# Patient Record
Sex: FEMALE | Race: WHITE | NOT HISPANIC OR LATINO | Employment: OTHER | URBAN - METROPOLITAN AREA
[De-identification: names, ages, dates, MRNs, and addresses within clinical notes are randomized per-mention and may not be internally consistent; named-entity substitution may affect disease eponyms.]

---

## 2017-02-13 ENCOUNTER — OFFICE VISIT (OUTPATIENT)
Dept: MEDICAL GROUP | Facility: MEDICAL CENTER | Age: 77
End: 2017-02-13

## 2017-02-13 VITALS
HEIGHT: 71 IN | HEART RATE: 70 BPM | BODY MASS INDEX: 21 KG/M2 | DIASTOLIC BLOOD PRESSURE: 70 MMHG | SYSTOLIC BLOOD PRESSURE: 102 MMHG | OXYGEN SATURATION: 94 % | TEMPERATURE: 98.4 F | WEIGHT: 150 LBS

## 2017-02-13 DIAGNOSIS — M15.9 PRIMARY OSTEOARTHRITIS INVOLVING MULTIPLE JOINTS: ICD-10-CM

## 2017-02-13 DIAGNOSIS — Z76.89 ENCOUNTER TO ESTABLISH CARE WITH NEW DOCTOR: ICD-10-CM

## 2017-02-13 PROCEDURE — 99203 OFFICE O/P NEW LOW 30 MIN: CPT | Performed by: FAMILY MEDICINE

## 2017-02-13 RX ORDER — POLYMYXIN B SULFATE AND TRIMETHOPRIM 1; 10000 MG/ML; [USP'U]/ML
SOLUTION OPHTHALMIC
COMMUNITY
Start: 2016-11-04 | End: 2017-02-16

## 2017-02-13 RX ORDER — CALCIUM CARBONATE 500(1250)
500 TABLET ORAL
COMMUNITY

## 2017-02-13 RX ORDER — NAPROXEN SODIUM 220 MG
TABLET ORAL
COMMUNITY
End: 2017-02-16

## 2017-02-13 RX ORDER — DIPHENOXYLATE HYDROCHLORIDE AND ATROPINE SULFATE 2.5; .025 MG/1; MG/1
1 TABLET ORAL DAILY
COMMUNITY

## 2017-02-13 RX ORDER — AMOXICILLIN 500 MG/1
CAPSULE ORAL
COMMUNITY
Start: 2016-05-13 | End: 2017-02-16

## 2017-02-13 RX ORDER — CHLORHEXIDINE GLUCONATE ORAL RINSE 1.2 MG/ML
SOLUTION DENTAL
COMMUNITY
Start: 2016-05-13 | End: 2017-02-16

## 2017-02-13 RX ORDER — PREDNISOLONE ACETATE 10 MG/ML
SUSPENSION/ DROPS OPHTHALMIC
COMMUNITY
Start: 2016-11-04 | End: 2017-02-16

## 2017-02-13 RX ORDER — METHYLPREDNISOLONE 4 MG/1
TABLET ORAL
COMMUNITY
Start: 2016-06-07 | End: 2017-02-16

## 2017-02-13 RX ORDER — CHLORHEXIDINE GLUCONATE ORAL RINSE 1.2 MG/ML
SOLUTION DENTAL
COMMUNITY
Start: 2016-06-07 | End: 2017-02-16

## 2017-02-13 RX ORDER — AMOXICILLIN 500 MG/1
CAPSULE ORAL
COMMUNITY
Start: 2016-11-22 | End: 2017-02-16

## 2017-02-13 RX ORDER — ACETAMINOPHEN 500 MG
TABLET ORAL
COMMUNITY
Start: 2016-07-11 | End: 2017-02-16

## 2017-02-13 RX ORDER — METHYLPREDNISOLONE 4 MG/1
TABLET ORAL
COMMUNITY
Start: 2016-05-13 | End: 2017-02-16

## 2017-02-13 RX ORDER — AMOXICILLIN 500 MG/1
CAPSULE ORAL
COMMUNITY
Start: 2016-06-07 | End: 2017-02-16

## 2017-02-13 ASSESSMENT — PATIENT HEALTH QUESTIONNAIRE - PHQ9: CLINICAL INTERPRETATION OF PHQ2 SCORE: 0

## 2017-02-13 NOTE — MR AVS SNAPSHOT
"        Mirian Jack   2017 1:40 PM   Office Visit   MRN: [id number]    Department:  [facility]   Dept Phone:  [phone]    Description:  Female : 1940   Provider:  Mari Light M.D.           Reason for Visit     Establish Care           Allergies as of 2017     Allergen Noted Reactions    Aspirin 2017   Unspecified    GI upset and sweating if she takes Aspirin or Advil on a regular basis.    Ibuprofen 2017   Unspecified    GI upset and sweating if she takes Advil or Aspirin on a regular basis.      Vital Signs     Blood Pressure Pulse Temperature Height Weight Body Mass Index    102/70 mmHg 70 36.9 °C (98.4 °F) 1.791 m (5' 10.51\") 68.04 kg (150 lb) 21.21 kg/m2    Oxygen Saturation Breastfeeding? Smoking Status             94% No Never Smoker          Basic Information     Date Of Birth Sex Race Ethnicity Preferred Language    1940 Female White Non- English      Health Maintenance        Date Due Completion Dates    IMM DTaP/Tdap/Td Vaccine (1 - Tdap) 1960 ---    PAP SMEAR 1962 ---    MAMMOGRAM 1981 ---    COLONOSCOPY 1991 ---    IMM ZOSTER VACCINE 2001 ---    BONE DENSITY 2006 ---    IMM PNEUMOCOCCAL 65+ (ADULT) LOW/MEDIUM RISK SERIES (1 of 2 - PCV13) 2006 ---    IMM INFLUENZA (1) 2016 ---            Current Immunizations     No immunizations on file.      Below and/or attached are the medications your provider expects you to take. Review all of your home medications and newly ordered medications with your provider and/or pharmacist. Follow medication instructions as directed by your provider and/or pharmacist. Please keep your medication list with you and share with your provider. Update the information when medications are discontinued, doses are changed, or new medications (including over-the-counter products) are added; and carry medication information at all times in the event of emergency situations    " Allergies:  ASPIRIN - Unspecified     IBUPROFEN - Unspecified               Medications  Valid as of: February 13, 2017 -  2:49 PM    Generic Name Brand Name Tablet Size Instructions for use    Acetaminophen (Tab) TYLENOL 500 MG Take  by mouth.        Amoxicillin (Cap) AMOXIL 500 MG Take 1 capsule orally three times a day until gone. Start the day of surgery.        Amoxicillin (Cap) AMOXIL 500 MG Take 1 capsule by mouth 3 times a day until gone        Amoxicillin (Cap) AMOXIL 500 MG Take 4 capsules 1 hour prior to appointment        B Complex Vitamins (Tab) b complex vitamins  Take  by mouth.        Calcium (Tab) OS-DEANN 500 500 MG Take  by mouth.        Chlorhexidine Gluconate (Solution) PERIDEX 0.12 % Rinse with one-half ounce orally twice daily for 2 weeks. Start 24 hours after surgery. Swish and spit.        Chlorhexidine Gluconate (Solution) PERIDEX 0.12 % Rinse one-half ounce orally 2 times daily for 2 weeks. Start 24 hours post op.        Cholecalciferol   Take  by mouth.        Cyanocobalamin (Tab) VITAMIN B12 1000 MCG Take  by mouth.        MethylPREDNISolone (Tablet Therapy Pack) MEDROL DOSEPAK 4 MG Day 1: Take 2 tablets orally at breakfast, 1 at lunch, 1 at dinner, 2 at bedtime. Day 2: 1 tablet 3 times a day plus 2 at bedtime. Day 3: 1 tablet 4 times a day. Day 4: 1 tablet 3 times a day. Day 5: 1 tablet 2 times a day. Day 6: 1 tablet at breakfast, then STOP        MethylPREDNISolone (Tablet Therapy Pack) MEDROL DOSEPAK 4 MG Use as directed until gone. Start the morning of procedure.        Multiple Vitamin (Tab) MULTI-VITAMINS  Take  by mouth.        Naproxen Sodium (Tab) ANAPROX 220 MG Take  by mouth.        Polymyxin B-Trimethoprim (Solution) POLYTRIM 55850-6.1 UNIT/ML-% by Ophthalmic route.        PrednisoLONE Acetate (Suspension) PRED FORTE 1 % by Ophthalmic route.        .                 Medicines prescribed today were sent to:     None      Medication refill instructions:       If your prescription  bottle indicates you have medication refills left, it is not necessary to call your provider’s office. Please contact your pharmacy and they will refill your medication.    If your prescription bottle indicates you do not have any refills left, you may request refills at any time through one of the following ways: The online Dotflux system (except Urgent Care), by calling your provider’s office, or by asking your pharmacy to contact your provider’s office with a refill request. Medication refills are processed only during regular business hours and may not be available until the next business day. Your provider may request additional information or to have a follow-up visit with you prior to refilling your medication.   *Please Note: Medication refills are assigned a new Rx number when refilled electronically. Your pharmacy may indicate that no refills were authorized even though a new prescription for the same medication is available at the pharmacy. Please request the medicine by name with the pharmacy before contacting your provider for a refill.           Dotflux Access Code: [email or web address]  Expires: 3/15/2017  2:49 PM    Your email address is not on file at [facility].  Email Addresses are required for you to sign up for Dotflux, please contact [phone] to verify your personal information and to provide your email address prior to attempting to register for Dotflux.    Mirian Jack  [address] [place] [address]    Dotflux  A secure, online tool to manage your health information     [facility]’s Dotflux® is a secure, online tool that connects you to your personalized health information from the privacy of your home -- day or night - making it very easy for you to manage your healthcare. Once the activation process is completed, you can even access your medical information using the Dotflux leonidas, which is available for free in the Apple Leonidas store or Google Play store.     To learn more about Dotflux,  visit [email or web address]    There are two levels of access available (as shown below):   My Chart Features  [facility]  Urgent  Care Non-[facility]   Email your healthcare team securely and privately 24/7 X X X    Manage appointments: schedule your next appointment; view details of past/upcoming appointments X      Request prescription refills. X      View recent personal medical records, including lab and immunizations X X X X   View health record, including health history, allergies, medications X X X X   Read reports about your outpatient visits, procedures, consult and ER notes X X X X   See your discharge summary, which is a recap of your hospital and/or ER visit that includes your diagnosis, lab results, and care plan X X  X     How to register for [employer]:  Once your e-mail address has been verified, follow the following steps to sign up for [employer]ark[employer].     1. Go to  [email or web address]  2. Click on the Sign Up Now box, which takes you to the New Member Sign Up page. You will need to provide the following information:  a. Enter your Trademarkia Access Code exactly as it appears at the top of this page. (You will not need to use this code after you’ve completed the sign-up process. If you do not sign up before the expiration date, you must request a new code.)   b. Enter your date of birth.   c. Enter your home email address.   d. Click Submit, and follow the next screen’s instructions.  3. Create a Trademarkia ID. This will be your Trademarkia login ID and cannot be changed, so think of one that is secure and easy to remember.  4. Create a Trademarkia password. You can change your password at any time.  5. Enter your Password Reset Question and Answer. This can be used at a later time if you forget your password.   6. Enter your e-mail address. This allows you to receive e-mail notifications when new information is available in Tr[employer]arkia.  7. Click Sign Up. You  can now view your health information.    For assistance activating your [facility] account, call [phone]

## 2017-02-16 PROBLEM — Z76.89 ENCOUNTER TO ESTABLISH CARE WITH NEW DOCTOR: Status: ACTIVE | Noted: 2017-02-16

## 2017-02-16 PROBLEM — M19.91 PRIMARY OSTEOARTHRITIS: Status: ACTIVE | Noted: 2017-02-16

## 2017-02-16 NOTE — ASSESSMENT & PLAN NOTE
Patient is here today to establish care. She and her  recently moved to the area. She was tackled at [facility]. 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 She's been doing well without any real complaints. She was seen at [place] and [facility] and so those records are available in Care Everywhere.

## 2017-02-16 NOTE — PROGRESS NOTES
Chief Complaint   Patient presents with   • Establish Care         Mirian Jack is a 76 y.o. female here to establish care and for evaluation and management of:        HPI:    Encounter to establish care with new doctor  Patient is here today to establish care. She and her  recently moved to the area. She was tackled at [facility]. 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 She's been doing well without any real complaints. She was seen at [facility] and [facility] and so those records are available in Care Everywhere.        Allergies   Allergen Reactions   • Aspirin Unspecified     GI upset and sweating if she takes Aspirin or Advil on a regular basis.   • Ibuprofen Unspecified     GI upset and sweating if she takes Advil or Aspirin on a regular basis.       Current medicines (including changes today)  Current Outpatient Prescriptions   Medication Sig Dispense Refill   • calcium carbonate (RA CALCIUM) 500 MG Tab Take  by mouth.     • cyanocobalamin (VITAMIN B12) 1000 MCG Tab Take  by mouth.     • Cholecalciferol (VITAMIN D PO) Take  by mouth.     • Multiple Vitamin (MULTI-VITAMINS) Tab Take  by mouth.     • b complex vitamins tablet Take  by mouth.       No current facility-administered medications for this visit.     She  has a past medical history of Cataract; Cancer (CMS-[facility]); Macular degeneration; and Tubular adenoma of colon.  She  has past surgical history that includes knee arthroplasty total (Right); foot surgery; laminotomy (); and eye surgery.  Social History   Substance Use Topics   • Smoking status: Never Smoker    • Smokeless tobacco: Never Used   • Alcohol Use: 6.0 oz/week     10 Glasses of wine per week     Social History     Social History Narrative     Family History   Problem Relation Age of Onset   • Heart Disease Mother    • Stroke Father      Family Status   Relation Status Death Age   • Mother     • Father     • Sister Alive   "        ROS  No fever or chills.  No nausea or vomiting.  No chest pain or palpitations.  No cough or SOB.  No pain with urination or hematuria.  No black or bloody stools.  All other systems reviewed and are negative     Objective:     Blood pressure 102/70, pulse 70, temperature 36.9 °C (98.4 °F), height 1.791 m (5' 10.51\"), weight 68.04 kg (150 lb), SpO2 94 %, not currently breastfeeding. Body mass index is 21.21 kg/(m^2).  Physical Exam:      Well developed, well nourished.  Alert, oriented in no acute distress.  Psych: Eye contact is good, speech goal directed, affect calm  Eyes: conjunctiva non-injected, sclera non-icteric.  Neck Supple.  No adenopathy or masses in the neck or supraclavicular regions. No thyromegaly  Lungs: clear to auscultation bilaterally with good excursion. No wheezes or rhonchi  CV: regular rate and rhythm. No murmur  Ext: no edema, color normal, vascularity normal, temperature normal      Assessment and Plan:   The following treatment plan was discussed    1. Encounter to establish care with new doctor  Reviewed old records. Patient will schedule well visit when due.    2. Primary osteoarthritis involving multiple joints  Patient managing symptomatically. No further therapy needed at this time      Records reviewed    Any change or worsening of signs or symptoms, patient encouraged to follow-up or report to the emergency room for further evaluation. Patient understands and agrees.    Followup: Return in about 6 months (around 8/13/2017).           "

## 2017-12-12 ENCOUNTER — OFFICE VISIT (OUTPATIENT)
Dept: MEDICAL GROUP | Facility: MEDICAL CENTER | Age: 77
End: 2017-12-12
Payer: MEDICARE

## 2017-12-12 VITALS
OXYGEN SATURATION: 94 % | TEMPERATURE: 97.4 F | BODY MASS INDEX: 20.58 KG/M2 | WEIGHT: 147 LBS | HEART RATE: 86 BPM | SYSTOLIC BLOOD PRESSURE: 112 MMHG | DIASTOLIC BLOOD PRESSURE: 60 MMHG | HEIGHT: 71 IN

## 2017-12-12 DIAGNOSIS — Z86.010 HISTORY OF COLONIC POLYPS: ICD-10-CM

## 2017-12-12 DIAGNOSIS — D50.9 MICROCYTIC ANEMIA: ICD-10-CM

## 2017-12-12 DIAGNOSIS — Z13.1 SCREENING FOR DIABETES MELLITUS: ICD-10-CM

## 2017-12-12 DIAGNOSIS — I70.0 AORTIC ATHEROSCLEROSIS (HCC): ICD-10-CM

## 2017-12-12 DIAGNOSIS — Z13.29 SCREENING FOR THYROID DISORDER: ICD-10-CM

## 2017-12-12 DIAGNOSIS — E78.5 DYSLIPIDEMIA: ICD-10-CM

## 2017-12-12 DIAGNOSIS — M15.9 PRIMARY OSTEOARTHRITIS INVOLVING MULTIPLE JOINTS: ICD-10-CM

## 2017-12-12 DIAGNOSIS — Z85.828 HISTORY OF BASAL CELL CARCINOMA: ICD-10-CM

## 2017-12-12 DIAGNOSIS — Z00.00 MEDICARE ANNUAL WELLNESS VISIT, INITIAL: ICD-10-CM

## 2017-12-12 DIAGNOSIS — N63.20 BREAST MASS, LEFT: ICD-10-CM

## 2017-12-12 DIAGNOSIS — N28.9 RENAL DYSFUNCTION: ICD-10-CM

## 2017-12-12 DIAGNOSIS — Z23 NEED FOR VACCINATION: ICD-10-CM

## 2017-12-12 PROBLEM — Z76.89 ENCOUNTER TO ESTABLISH CARE WITH NEW DOCTOR: Status: RESOLVED | Noted: 2017-02-16 | Resolved: 2017-12-12

## 2017-12-12 PROCEDURE — 99213 OFFICE O/P EST LOW 20 MIN: CPT | Mod: 25 | Performed by: FAMILY MEDICINE

## 2017-12-12 PROCEDURE — G0438 PPPS, INITIAL VISIT: HCPCS | Mod: 25 | Performed by: FAMILY MEDICINE

## 2017-12-12 PROCEDURE — 90670 PCV13 VACCINE IM: CPT | Performed by: FAMILY MEDICINE

## 2017-12-12 PROCEDURE — G0009 ADMIN PNEUMOCOCCAL VACCINE: HCPCS | Performed by: FAMILY MEDICINE

## 2017-12-12 RX ORDER — ASCORBIC ACID 500 MG
500 TABLET ORAL DAILY
COMMUNITY

## 2017-12-12 RX ORDER — TURMERIC ROOT EXTRACT 500 MG
1 TABLET ORAL DAILY
COMMUNITY

## 2017-12-12 ASSESSMENT — PATIENT HEALTH QUESTIONNAIRE - PHQ9
5. POOR APPETITE OR OVEREATING: 0 - NOT AT ALL
SUM OF ALL RESPONSES TO PHQ QUESTIONS 1-9: 2
CLINICAL INTERPRETATION OF PHQ2 SCORE: 2

## 2017-12-12 NOTE — PROGRESS NOTES
Chief Complaint   Patient presents with   • Annual Wellness Visit         HPI:  Mirian is a 77 y.o. here for Medicare Annual Wellness Visit          Patient Active Problem List    Diagnosis Date Noted   • Dyslipidemia 12/12/2017   • Microcytic anemia 12/12/2017   • Renal dysfunction 12/12/2017   • Breast mass, left 12/12/2017   • Primary osteoarthritis 02/16/2017   • History of tubular adenoma of the colon    • Aortic atherosclerosis (CMS-HCC) 10/12/2016   • History of basal cell carcinoma 11/11/2015       Current Outpatient Prescriptions   Medication Sig Dispense Refill   • ascorbic acid (ASCORBIC ACID) 500 MG Tab Take 500 mg by mouth every day.     • Turmeric 500 MG Tab Take  by mouth.     • calcium carbonate (RA CALCIUM) 500 MG Tab Take  by mouth.     • cyanocobalamin (VITAMIN B12) 1000 MCG Tab Take  by mouth.     • Cholecalciferol (VITAMIN D PO) Take  by mouth.     • Multiple Vitamin (MULTI-VITAMINS) Tab Take  by mouth.     • b complex vitamins tablet Take  by mouth.       No current facility-administered medications for this visit.         Patient is taking medications as noted in medication list.  Current supplements as per medication list.     Allergies: Aspirin and Ibuprofen    Current social contact/activities: Pt is very active with physical activities. .      Is patient current with immunizations? No, due for PREVNAR (PCV13) . Patient is interested in receiving PREVNAR (PCV13)  today.    She  reports that she has never smoked. She has never used smokeless tobacco. She reports that she drinks about 6.0 oz of alcohol per week .  Counseling given: Not Answered        DPA/Advanced directive: Patient does not have an Advanced Directive.  A packet and workshop information was given on Advanced Directives.    ROS:    Gait: Uses no assistive device   Ostomy: no   Other tubes: no   Amputations: no   Chronic oxygen use no   Last eye exam 1 year ago    Wears hearing aids: no   : Denies any urinary leakage during  the last 6 months        Depression Screening    Little interest or pleasure in doing things?  0 - not at all  Feeling down, depressed, or hopeless? 2 - more than half the days  Trouble falling or staying asleep, or sleeping too much?  0 - not at all  Feeling tired or having little energy?  0 - not at all  Poor appetite or overeating?  0 - not at all  Feeling bad about yourself - or that you are a failure or have let yourself or your family down? 0 - not at all  Trouble concentrating on things, such as reading the newspaper or watching television? 0 - not at all  Moving or speaking so slowly that other people could have noticed.  Or the opposite - being so fidgety or restless that you have been moving around a lot more than usual?  0 - not at all  Thoughts that you would be better off dead, or of hurting yourself?  0 - not at all  Patient Health Questionnaire Score: 2      If depressive symptoms identified deferred to follow up visit unless specifically addressed in assessment and plan.    Interpretation of PHQ-9 Total Score   Score Severity   1-4 No Depression   5-9 Mild Depression   10-14 Moderate Depression   15-19 Moderately Severe Depression   20-27 Severe Depression      Screening for Cognitive Impairment    Three Minute Recall (apple, watch, bernie)  3/3    Draw clock face with all 12 numbers set to the hand to show 10 minutes past 11 o'clock  1 5/5  If cognitive concerns identified, deferred for follow up unless specifically addressed in assessment and plan.    Fall Risk Assessment    Has the patient had two or more falls in the last year or any fall with injury in the last year?  Yes  If fall risk identified, deferred for follow up unless specifically addressed in assessment and plan.      Safety Assessment    Throw rugs on floor.  Yes  Handrails on all stairs.  Yes  Good lighting in all hallways.  Yes  Difficulty hearing.  No  Patient counseled about all safety risks that were identified.    Functional  "Assessment ADLs    Are there any barriers preventing you from cooking for yourself or meeting nutritional needs?  No.    Are there any barriers preventing you from driving safely or obtaining transportation?  No.    Are there any barriers preventing you from using a telephone or calling for help?  No.    Are there any barriers preventing you from shopping?  No.    Are there any barriers preventing you from taking care of your own finances?  No.    Are there any barriers preventing you from managing your medications?  No.    Are you currently engaging any exercise or physical activity?  Yes.  Pt goes to the gym on a daily basis.     Health Maintenance Summary                IMM PNEUMOCOCCAL 65+ (ADULT) LOW/MEDIUM RISK SERIES Overdue 11/12/2006     MAMMOGRAM Overdue 5/29/2016      Done 5/29/2015     IMM INFLUENZA Overdue 9/1/2017      Done 10/7/2016 Imm Admin: Influenza Vaccine Adult HD    BONE DENSITY Next Due 7/9/2020      Done 7/9/2015     IMM DTaP/Tdap/Td Vaccine Next Due 9/27/2022      Done 9/27/2012 Imm Admin: Tdap Vaccine    COLONOSCOPY Next Due 6/15/2026      Done 6/15/2016           Patient Care Team:  Mari Light M.D. as PCP - General (Family Medicine)      Social History   Substance Use Topics   • Smoking status: Never Smoker   • Smokeless tobacco: Never Used   • Alcohol use 6.0 oz/week     10 Glasses of wine per week     Family History   Problem Relation Age of Onset   • Heart Disease Mother    • Stroke Father      She  has a past medical history of Cancer (CMS-HCC); Cataract; Macular degeneration; and Tubular adenoma of colon.   Past Surgical History:   Procedure Laterality Date   • LAMINOTOMY  2000    L4-5   • EYE SURGERY      lasik and cataracts   • FOOT SURGERY     • KNEE ARTHROPLASTY TOTAL Right          Exam:     Blood pressure 112/60, pulse 86, temperature 36.3 °C (97.4 °F), height 1.791 m (5' 10.5\"), weight 66.7 kg (147 lb), SpO2 94 %. Body mass index is 20.79 kg/m².    Hearing good.  "   Dentition good  Alert, oriented in no acute distress.  Eye contact is good, speech goal directed, affect calm  Neck: Supple, no adenopathy  Lungs: Clear to auscultation bilaterally without wheezes or rhonchi  CV: Regular rate and rhythm without murmur  BREAST EXAM: No skin changes, peau d'orange or nipple retraction. No discharge. No axillary or supraclavicular adenopathy. There is a cystic mass inferior to the left breast that is freely mobile with smooth margins and less than a centimeter in diameter      Assessment and Plan. The following treatment and monitoring plan is recommended:  1. Medicare annual wellness visit, initial  Routine anticipatory guidance. Continue regular exercise. No  needed at this time    2. Dyslipidemia  Stable. Discussed low-fat diet. Recheck labs  - LIPID PROFILE; Future    3. Microcytic anemia  Recheck CBC and iron studies. Await results  - CBC WITH DIFFERENTIAL; Future  - FERRITIN; Future  - IRON/TOTAL IRON BIND; Future    4. Primary osteoarthritis involving multiple joints  Given the patient's osteoarthritis we will check for autoimmune dysfunction  - DARIA ANTIBODY WITH REFLEX; Future  - CCP ANTIBODY; Future  - RHEUMATOID ARTHRITIS FACTOR; Future    5. History of tubular adenoma of the colon  Stable. Continue follow-up with gastroenterology    6. History of basal cell carcinoma  Stable. Continue sun protection and dermatology follow-up    7. Aortic atherosclerosis (CMS-HCC)  Stable. Continue to monitor    8. Renal dysfunction  Avoid NSAIDs. Continue to monitor  - COMP METABOLIC PANEL; Future    9. Screening for thyroid disorder  Screening labs ordered.  Await results for counseling.    - TSH; Future    10. Screening for diabetes mellitus  Screening labs ordered.  Await results for counseling.    - COMP METABOLIC PANEL; Future    11. Breast mass, left  Diagnostic mammogram  - MA-DIAGNOSTIC MAMMO W/CAD-BILAT; Future  - US-BREAST COMPLETE-LEFT; Future    12. Need for  vaccination    - PNEUMOCOCCAL CONJUGATE VACCINE 13-VALENT      Services suggested: No services needed at this time  Health Care Screening recommendations as per orders if indicated.  Referrals offered: PT/OT/Nutrition counseling/Behavioral Health/Smoking cessation as per orders if indicated.    Discussion today about general wellness and lifestyle habits:    · Prevent falls and reduce trip hazards; Cautioned about securing or removing rugs.  · Have a working fire alarm and carbon monoxide detector;   · Engage in regular physical activity and social activities       Follow-up: Return if symptoms worsen or fail to improve.

## 2017-12-15 ENCOUNTER — HOSPITAL ENCOUNTER (OUTPATIENT)
Dept: LAB | Facility: MEDICAL CENTER | Age: 77
End: 2017-12-15
Attending: FAMILY MEDICINE
Payer: MEDICARE

## 2017-12-15 DIAGNOSIS — D50.9 MICROCYTIC ANEMIA: ICD-10-CM

## 2017-12-15 DIAGNOSIS — Z13.1 SCREENING FOR DIABETES MELLITUS: ICD-10-CM

## 2017-12-15 DIAGNOSIS — N28.9 RENAL DYSFUNCTION: ICD-10-CM

## 2017-12-15 DIAGNOSIS — M15.9 PRIMARY OSTEOARTHRITIS INVOLVING MULTIPLE JOINTS: ICD-10-CM

## 2017-12-15 DIAGNOSIS — Z13.29 SCREENING FOR THYROID DISORDER: ICD-10-CM

## 2017-12-15 DIAGNOSIS — E78.5 DYSLIPIDEMIA: ICD-10-CM

## 2017-12-15 LAB
ALBUMIN SERPL BCP-MCNC: 4.1 G/DL (ref 3.2–4.9)
ALBUMIN/GLOB SERPL: 1.6 G/DL
ALP SERPL-CCNC: 81 U/L (ref 30–99)
ALT SERPL-CCNC: 14 U/L (ref 2–50)
ANION GAP SERPL CALC-SCNC: 10 MMOL/L (ref 0–11.9)
AST SERPL-CCNC: 19 U/L (ref 12–45)
BASOPHILS # BLD AUTO: 1.3 % (ref 0–1.8)
BASOPHILS # BLD: 0.06 K/UL (ref 0–0.12)
BILIRUB SERPL-MCNC: 1.1 MG/DL (ref 0.1–1.5)
BUN SERPL-MCNC: 15 MG/DL (ref 8–22)
CALCIUM SERPL-MCNC: 9.5 MG/DL (ref 8.5–10.5)
CHLORIDE SERPL-SCNC: 100 MMOL/L (ref 96–112)
CHOLEST SERPL-MCNC: 252 MG/DL (ref 100–199)
CO2 SERPL-SCNC: 26 MMOL/L (ref 20–33)
CREAT SERPL-MCNC: 0.76 MG/DL (ref 0.5–1.4)
EOSINOPHIL # BLD AUTO: 0.16 K/UL (ref 0–0.51)
EOSINOPHIL NFR BLD: 3.4 % (ref 0–6.9)
ERYTHROCYTE [DISTWIDTH] IN BLOOD BY AUTOMATED COUNT: 45.6 FL (ref 35.9–50)
FERRITIN SERPL-MCNC: 122.7 NG/ML (ref 10–291)
GFR SERPL CREATININE-BSD FRML MDRD: >60 ML/MIN/1.73 M 2
GLOBULIN SER CALC-MCNC: 2.6 G/DL (ref 1.9–3.5)
GLUCOSE SERPL-MCNC: 86 MG/DL (ref 65–99)
HCT VFR BLD AUTO: 40.9 % (ref 37–47)
HDLC SERPL-MCNC: 92 MG/DL
HGB BLD-MCNC: 13.9 G/DL (ref 12–16)
IMM GRANULOCYTES # BLD AUTO: 0.01 K/UL (ref 0–0.11)
IMM GRANULOCYTES NFR BLD AUTO: 0.2 % (ref 0–0.9)
IRON SATN MFR SERPL: 47 % (ref 15–55)
IRON SERPL-MCNC: 168 UG/DL (ref 40–170)
LDLC SERPL CALC-MCNC: 147 MG/DL
LYMPHOCYTES # BLD AUTO: 1.2 K/UL (ref 1–4.8)
LYMPHOCYTES NFR BLD: 25.8 % (ref 22–41)
MCH RBC QN AUTO: 33.9 PG (ref 27–33)
MCHC RBC AUTO-ENTMCNC: 34 G/DL (ref 33.6–35)
MCV RBC AUTO: 99.8 FL (ref 81.4–97.8)
MONOCYTES # BLD AUTO: 0.4 K/UL (ref 0–0.85)
MONOCYTES NFR BLD AUTO: 8.6 % (ref 0–13.4)
NEUTROPHILS # BLD AUTO: 2.83 K/UL (ref 2–7.15)
NEUTROPHILS NFR BLD: 60.7 % (ref 44–72)
NRBC # BLD AUTO: 0 K/UL
NRBC BLD AUTO-RTO: 0 /100 WBC
PLATELET # BLD AUTO: 241 K/UL (ref 164–446)
PMV BLD AUTO: 8.8 FL (ref 9–12.9)
POTASSIUM SERPL-SCNC: 3.7 MMOL/L (ref 3.6–5.5)
PROT SERPL-MCNC: 6.7 G/DL (ref 6–8.2)
RBC # BLD AUTO: 4.1 M/UL (ref 4.2–5.4)
RHEUMATOID FACT SER IA-ACNC: 18 IU/ML (ref 0–14)
SODIUM SERPL-SCNC: 136 MMOL/L (ref 135–145)
TIBC SERPL-MCNC: 360 UG/DL (ref 250–450)
TRIGL SERPL-MCNC: 65 MG/DL (ref 0–149)
TSH SERPL DL<=0.005 MIU/L-ACNC: 2.15 UIU/ML (ref 0.38–5.33)
WBC # BLD AUTO: 4.7 K/UL (ref 4.8–10.8)

## 2017-12-15 PROCEDURE — 82728 ASSAY OF FERRITIN: CPT

## 2017-12-15 PROCEDURE — 86235 NUCLEAR ANTIGEN ANTIBODY: CPT

## 2017-12-15 PROCEDURE — 84443 ASSAY THYROID STIM HORMONE: CPT

## 2017-12-15 PROCEDURE — 83540 ASSAY OF IRON: CPT

## 2017-12-15 PROCEDURE — 80061 LIPID PANEL: CPT

## 2017-12-15 PROCEDURE — 85025 COMPLETE CBC W/AUTO DIFF WBC: CPT

## 2017-12-15 PROCEDURE — 83550 IRON BINDING TEST: CPT

## 2017-12-15 PROCEDURE — 86225 DNA ANTIBODY NATIVE: CPT

## 2017-12-15 PROCEDURE — 80053 COMPREHEN METABOLIC PANEL: CPT

## 2017-12-15 PROCEDURE — 86431 RHEUMATOID FACTOR QUANT: CPT

## 2017-12-15 PROCEDURE — 86200 CCP ANTIBODY: CPT

## 2017-12-15 PROCEDURE — 86038 ANTINUCLEAR ANTIBODIES: CPT

## 2017-12-15 PROCEDURE — 36415 COLL VENOUS BLD VENIPUNCTURE: CPT

## 2017-12-16 LAB — CCP IGG SERPL-ACNC: 7 UNITS (ref 0–19)

## 2017-12-17 LAB — NUCLEAR IGG SER QL IA: NORMAL

## 2017-12-19 DIAGNOSIS — M05.80 POLYARTHRITIS WITH POSITIVE RHEUMATOID FACTOR (HCC): ICD-10-CM

## 2018-01-09 ENCOUNTER — HOSPITAL ENCOUNTER (OUTPATIENT)
Dept: RADIOLOGY | Facility: MEDICAL CENTER | Age: 78
End: 2018-01-09

## 2018-01-11 ENCOUNTER — HOSPITAL ENCOUNTER (OUTPATIENT)
Dept: RADIOLOGY | Facility: MEDICAL CENTER | Age: 78
End: 2018-01-11
Attending: FAMILY MEDICINE
Payer: MEDICARE

## 2018-01-11 DIAGNOSIS — N63.20 BREAST MASS, LEFT: ICD-10-CM

## 2018-01-11 PROCEDURE — 77066 DX MAMMO INCL CAD BI: CPT

## 2018-01-11 PROCEDURE — 76642 ULTRASOUND BREAST LIMITED: CPT | Mod: LT

## 2018-02-09 DIAGNOSIS — H00.019 HORDEOLUM EXTERNUM, UNSPECIFIED LATERALITY: ICD-10-CM

## 2018-07-12 ENCOUNTER — OFFICE VISIT (OUTPATIENT)
Dept: RHEUMATOLOGY | Facility: PHYSICIAN GROUP | Age: 78
End: 2018-07-12
Payer: MEDICARE

## 2018-07-12 VITALS
TEMPERATURE: 97.5 F | SYSTOLIC BLOOD PRESSURE: 128 MMHG | BODY MASS INDEX: 21.73 KG/M2 | HEART RATE: 80 BPM | DIASTOLIC BLOOD PRESSURE: 72 MMHG | WEIGHT: 153.6 LBS | OXYGEN SATURATION: 95 %

## 2018-07-12 DIAGNOSIS — R76.8 RHEUMATOID FACTOR POSITIVE: ICD-10-CM

## 2018-07-12 DIAGNOSIS — Z11.59 ENCOUNTER FOR SCREENING FOR OTHER VIRAL DISEASES: ICD-10-CM

## 2018-07-12 PROCEDURE — 99204 OFFICE O/P NEW MOD 45 MIN: CPT | Performed by: INTERNAL MEDICINE

## 2018-07-12 ASSESSMENT — ENCOUNTER SYMPTOMS
BLURRED VISION: 0
SPEECH CHANGE: 0
SHORTNESS OF BREATH: 0
HEADACHES: 0
SENSORY CHANGE: 0
DIARRHEA: 1
MYALGIAS: 0
CHILLS: 0
DEPRESSION: 0
CONSTIPATION: 0
FEVER: 0
WEIGHT LOSS: 0
COUGH: 0
DOUBLE VISION: 0
BLOOD IN STOOL: 0
DIZZINESS: 0

## 2018-07-12 NOTE — LETTER
[facility]   [address], [address]  [place] [address]  Phone: [phone]  Fax: [phone]              Encounter Date: 7/12/2018    Dear Dr. Bailey ref. provider found,    It was a pleasure seeing your patient, Mirian Jack, on 7/12/2018. Diagnoses of Rheumatoid factor positive and Encounter for screening for other viral diseases  were pertinent to this visit.     Please find attached progress note which includes the history I obtained from Ms. Jack, my physical examination findings, my impression and recommendations.      Once again, it was a pleasure participating in your patient's care.  Please feel free to contact me if you have any questions or if I can be of any further assistance to your patients.      Sincerely,    Liza Tenorio M.D.  Electronically Signed          PROGRESS NOTE:  Chief Complaint- positive RF    Chief Complaint   Patient presents with   • New Patient     Mirian Jack is a 77 y.o. female here as a new Rheumatology Consult referred by Mari Light M.D. for consultation regarding positive RF     HPI:     Ms. Mirian Jack presents for evaluation for a positive RF and increasing arthralgias.    She reports last June (2017) she [place] [employer].  Since she continues to have R knee. She also reports right hip and pain in the left hip and left knee.  She does note pain on the R >. L.  She has tried physical therapy, ibuprofen with minimal relief.      She has tried ibuprofen it does help but does not sleep well and she reports it causes diaphoresis.  She is trying tumeric, MSM.  The patient does report that she has pain in the hips but once she starts biking she does fine.  The pain has limited her exercise.  She also describes a gelling phenomenon.    She has prolonged morning stiffness.  She reports sicca saymptoms over the last two years (since moving to [place])    She reports a subluxed C2/C3.  About 25 years ago she had some back pain and had surgery.  She does  report diarrhea that is associated with taking her vitamins before she eats.  HSe describes increase fatige.      Past Medical History: R knee replacement.  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  In  she had cataract surgery of the left eye.  In  she had cataract surgery of the right eye.  In  she had a right knee replacement.    Family History: Father had  CVA mother had cardiac arrhythmia children has depression, seizure disorder.  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     • Turmeric 500 MG Tab Take  by mouth.     • Cholecalciferol (VITAMIN D PO) Take  by mouth.     • Multiple Vitamin (MULTI-VITAMINS) Tab Take  by mouth.     • b complex vitamins tablet Take  by mouth.     • calcium carbonate (RA CALCIUM) 500 MG Tab Take  by mouth.     • cyanocobalamin (VITAMIN B12) 1000 MCG Tab Take  by mouth.       No current facility-administered medications for this visit.      She  has a past medical history of Cancer (CMS-HCC) (HCC); Cataract; Macular degeneration; and Tubular adenoma of colon.  She  has a past surgical history that includes knee arthroplasty total (Right); foot surgery; laminotomy (); and eye surgery.  Family History   Problem Relation Age of Onset   • Heart Disease Mother    • Stroke Father      Family Status   Relation Status   • Mother    • Father    • Sister Alive     Social History   Substance Use Topics   • Smoking status: Never Smoker   • Smokeless tobacco: Never Used   • Alcohol use 6.0 oz/week     10 Glasses of wine per week     Social History     Social History Narrative    • No narrative on file         ROS  Review of Systems   Constitutional: Negative for chills, fever and weight loss.   HENT: Negative for hearing loss.         Notes decrease taste sensation   Eyes: Negative for blurred vision and double vision.   Respiratory: Negative for cough and shortness of breath.    Gastrointestinal: Positive for diarrhea. Negative for blood in stool and constipation.   Genitourinary: Negative for dysuria and urgency.   Musculoskeletal: Positive for joint pain. Negative for myalgias.   Skin: Negative for rash.   Neurological: Negative for dizziness, sensory change, speech change and headaches.   Psychiatric/Behavioral: Negative for depression.          Objective:     Blood pressure 128/72, pulse 80, temperature 36.4 °C (97.5 °F), weight 69.7 kg (153 lb 9.6 oz), SpO2 95 %. Body mass index is 21.73 kg/m².  Physical Exam:    Vitals:    07/12/18 1112   BP: 128/72   Pulse: 80   Temp: 36.4 °C (97.5 °F)   SpO2: 95%   Weight: 69.7 kg (153 lb 9.6 oz)    Body mass index is 21.73 kg/m².    General/Constitutional: NAD not diaphoretic, comfortable  Eyes: clear conjunctiva, no scleral icterus, EOMI, PERRL  Ears, Nose, Mouth,Throat: no oral ulcers, good dentition, moist mucous membranes, no discharge from ears bilaterally  Cardiovascular: regular rate and rhythm.  No murmurs, gallops, rubs  Respiratory: normal effort, unlabored respiration.  On auscultation no wheezes, rales, rhonchi.  Clear to auscultation.  GI: soft, NTTP no hepatosplenomegaly, nondistended  Musculoskeletal  Axial:  Thoracic: no kyphosis.  No midline tenderness  Upper Extremities:  No synovitis of the PIP, DIP, MCP  Wrists and Elbows have good ROM chronic arthritic changes noted on the.  Right wrist.  Lower Extremities:  No knee effusion bilateral, No crepitus bilateral  No MTP tenderness bilateral  Muscle Strength: 5/5 in hip flexion in the left and 5-/5 hip flexion on the right.  Negative Patricks test  Left hip has restricted internal  rotation  Gait is normal  Skin: Limited skin exam.  no rashes, no digital ulcerations, no alopecia, no tophi, no skin thickening, no nodules  Neuro: CN II-XII grossly intact, Alert, Oriented x 3, moves all four extremities  Psych: normal affect, normal mood, judgement appropriate, follows commands, responses are appropritae  Heme/Lymph: no cervical adenopathy        No results found for: QNTTBGOLD  No results found for: HEPBCORTOT, HEPBCORIGM, HEPBSAG  No results found for: HEPCAB  Lab Results   Component Value Date/Time    SODIUM 136 12/15/2017 01:04 PM    POTASSIUM 3.7 12/15/2017 01:04 PM    CHLORIDE 100 12/15/2017 01:04 PM    CO2 26 12/15/2017 01:04 PM    GLUCOSE 86 12/15/2017 01:04 PM    BUN 15 12/15/2017 01:04 PM    CREATININE 0.76 12/15/2017 01:04 PM      Lab Results   Component Value Date/Time    WBC 4.7 (L) 12/15/2017 01:04 PM    RBC 4.10 (L) 12/15/2017 01:04 PM    HEMOGLOBIN 13.9 12/15/2017 01:04 PM    HEMATOCRIT 40.9 12/15/2017 01:04 PM    MCV 99.8 (H) 12/15/2017 01:04 PM    MCH 33.9 (H) 12/15/2017 01:04 PM    MCHC 34.0 12/15/2017 01:04 PM    MPV 8.8 (L) 12/15/2017 01:04 PM    NEUTSPOLYS 60.70 12/15/2017 01:04 PM    LYMPHOCYTES 25.80 12/15/2017 01:04 PM    MONOCYTES 8.60 12/15/2017 01:04 PM    EOSINOPHILS 3.40 12/15/2017 01:04 PM    BASOPHILS 1.30 12/15/2017 01:04 PM      Lab Results   Component Value Date/Time    CALCIUM 9.5 12/15/2017 01:04 PM    ASTSGOT 19 12/15/2017 01:04 PM    ALTSGPT 14 12/15/2017 01:04 PM    ALKPHOSPHAT 81 12/15/2017 01:04 PM    TBILIRUBIN 1.1 12/15/2017 01:04 PM    ALBUMIN 4.1 12/15/2017 01:04 PM    TOTPROTEIN 6.7 12/15/2017 01:04 PM     Lab Results   Component Value Date/Time    RHEUMFACTN 18 (H) 12/15/2017 01:04 PM    CCPANTIBODY 7 12/15/2017 01:04 PM    ANTINUCAB None Detected 12/15/2017 01:05 PM     No results found for: SEDRATEWES, CREACTPROT  No results found for: RUSSELVIPER, DRVVTINTERP  No results found for: E2ZICZJJEXJ, F1OLYEJMXWU, IGGCARDIOLI, IGMCARDIOLI, IGACARDIOLI,  CRYOGLOBULIN  No results found for: ANADIRECT, ANTIDNADS, RNPAB, SMITHAB, FOEXOLK84, SSAROAB, SSBLAAB, RNEPET5KE, CENTROMBAB  No results found for: ANTIDNADS, DSDNA, AGBMAB, GBMABA, ANCAIGG, G1GLXPEKIKX, JO1AB, RNPAB, ANTISSASJ, ANTISSBSJ  No results found for: COLORURINE, SPECGRAVITY, PHURINE, GLUCOSEUR, KETONES, PROTEINURIN  No results found for: TOTPROTUR, TOTALVOLUME, [id number]  No results found for: SSA60, SSA52  No results found for: HBA1C  No results found for: CPKTOTAL  No results found for: G6PD  No results found for: CXDW11HFTZ  No results found for: ACESERUM  No results found for: 25HYDROXY  No results found for: TSH, FREEDIR  Lab Results   Component Value Date/Time    TSHULTRASEN 2.150 12/15/2017 01:04 PM     No results found for: MICROSOMALA, ANTITHYROGL  No results found for: IGGLYMEABS  No results found for: ANTIMITOCHO, FACTIN  No results found for: IGA, TTRANSIGA, ENDOIGA  No results found for: FLTYPE, CRYSTALSBDF  No results found for: ISTATICAL  No results found for: ISTATCREAT  No results found for: CTELOPEP  No results found for: GBMABG  No results found for: PTHINTACT          Assessment and Plan:  Ms. Mirian Jack presents for evaluation of a low titer RF with pain presenting in the medium and large joints - knees, hip etc.    her symptoms of prolonged stiffness in the morning gelling phenomenon would be suggestive of inflammatory disease however the hip is not a typical joint affected by rheumatoid arthritis.  Her rheumatoid factor is a low titer.  I would consider possible spondyloarthropathy although she does not report any back pain.  She may have an undifferentiated spondyloarthropathy.  We can check an HLA-B27.  On exam I did not appreciate any enthesitis.  On examination of her wrist there does not appear to be some chronic changes.  I had like to get x-rays of her hands and feet as well as her wrist on the right side.  Furthermore like to order a hip x-ray as well on the right.    In  terms of her workup it seems like the joint affected the greatest is the right hip and she has already tried physical therapy.  If her workup with her sedimentation rate is within normal CK and aldolase are normal I would suspect a possible inflammatory arthropathy and would consider perhaps working up with an MRI.    Also to workup the low positive rheumatoid factor we will check hepatitis panel.    1. Rheumatoid factor positive  HLA-B27    CBC WITH DIFFERENTIAL    COMP METABOLIC PANEL    CRP QUANTITIVE (NON-CARDIAC)    WESTERGREN SED RATE    G6PD QUANT + RBC    HEP C VIRUS ANTIBODY    HEP B CORE AB TOTAL    HEP B SURFACE ANTIGEN    DX-JOINT SURVEY-HANDS SINGLE VIEW    DX-JOINT SURVEY-FEET SINGLE VIEW    DX-HIP-COMPLETE - UNILATERAL 2+ RIGHT    CREATINE KINASE    ALDOLASE   2. Encounter for screening for other viral diseases   HEP B CORE AB TOTAL       Followup: No Follow-up on file. or sooner prn  Patient was seen 60 minutes face-to-face (excluding time for procedures)  of which more than 50% the time was spent counseling the patient regarding  rheumatological conditions and care. Therapy was discussed in detail.  Thank you for this referral.

## 2018-07-12 NOTE — PROGRESS NOTES
Chief Complaint- positive RF    Chief Complaint   Patient presents with   • New Patient     Mirian Jack is a 77 y.o. female here as a new Rheumatology Consult referred by Mari Light M.D. for consultation regarding positive RF     HPI:     Ms. Mirian Jack presents for evaluation for a positive RF and increasing arthralgias.    She reports last June (2017) she [place] [employer].  Since she continues to have R knee. She also reports right hip and pain in the left hip and left knee.  She does note pain on the R >. L.  She has tried physical therapy, ibuprofen with minimal relief.      She has tried ibuprofen it does help but does not sleep well and she reports it causes diaphoresis.  She is trying tumeric, MSM.  The patient does report that she has pain in the hips but once she starts biking she does fine.  The pain has limited her exercise.  She also describes a gelling phenomenon.    She has prolonged morning stiffness.  She reports sicca saymptoms over the last two years (since moving to [place])    She reports a subluxed C2/C3.  About 25 years ago she had some back pain and had surgery.  She does report diarrhea that is associated with taking her vitamins before she eats.  HSe describes increase fatige.      Past Medical History: R knee replacement.  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  In 2017 she had cataract surgery of the left eye.  In 2014 she had cataract surgery of the right eye.  In 2012 she had a right knee replacement.    Family History: Father had  CVA mother had cardiac arrhythmia children has depression, seizure disorder.  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     • Turmeric 500 MG Tab Take  by mouth.     • Cholecalciferol (VITAMIN D PO) Take  by mouth.     • Multiple Vitamin (MULTI-VITAMINS) Tab Take  by mouth.     • b complex vitamins tablet Take  by mouth.     • calcium carbonate (RA CALCIUM) 500 MG Tab Take  by mouth.     • cyanocobalamin (VITAMIN B12) 1000 MCG Tab Take  by mouth.       No current facility-administered medications for this visit.      She  has a past medical history of Cancer (CMS-HCC) (HCC); Cataract; Macular degeneration; and Tubular adenoma of colon.  She  has a past surgical history that includes knee arthroplasty total (Right); foot surgery; laminotomy (); and eye surgery.  Family History   Problem Relation Age of Onset   • Heart Disease Mother    • Stroke Father      Family Status   Relation Status   • Mother    • Father    • Sister Alive     Social History   Substance Use Topics   • Smoking status: Never Smoker   • Smokeless tobacco: Never Used   • Alcohol use 6.0 oz/week     10 Glasses of wine per week     Social History     Social History Narrative   • No narrative on file         ROS  Review of Systems   Constitutional: Negative for chills, fever and weight loss.   HENT: Negative for hearing loss.         Notes decrease taste sensation   Eyes: Negative for blurred vision and double vision.   Respiratory: Negative for cough and shortness of breath.    Gastrointestinal: Positive for diarrhea. Negative for blood in stool and constipation.   Genitourinary: Negative for dysuria and urgency.   Musculoskeletal: Positive for joint pain. Negative for myalgias.   Skin: Negative for rash.   Neurological: Negative for dizziness, sensory change, speech change and headaches.   Psychiatric/Behavioral: Negative for depression.          Objective:     Blood pressure 128/72, pulse 80, temperature 36.4 °C (97.5 °F), weight 69.7 kg (153 lb 9.6 oz), SpO2 95 %. Body mass index is  21.73 kg/m².  Physical Exam:    Vitals:    07/12/18 1112   BP: 128/72   Pulse: 80   Temp: 36.4 °C (97.5 °F)   SpO2: 95%   Weight: 69.7 kg (153 lb 9.6 oz)    Body mass index is 21.73 kg/m².    General/Constitutional: NAD not diaphoretic, comfortable  Eyes: clear conjunctiva, no scleral icterus, EOMI, PERRL  Ears, Nose, Mouth,Throat: no oral ulcers, good dentition, moist mucous membranes, no discharge from ears bilaterally  Cardiovascular: regular rate and rhythm.  No murmurs, gallops, rubs  Respiratory: normal effort, unlabored respiration.  On auscultation no wheezes, rales, rhonchi.  Clear to auscultation.  GI: soft, NTTP no hepatosplenomegaly, nondistended  Musculoskeletal  Axial:  Thoracic: no kyphosis.  No midline tenderness  Upper Extremities:  No synovitis of the PIP, DIP, MCP  Wrists and Elbows have good ROM chronic arthritic changes noted on the.  Right wrist.  Lower Extremities:  No knee effusion bilateral, No crepitus bilateral  No MTP tenderness bilateral  Muscle Strength: 5/5 in hip flexion in the left and 5-/5 hip flexion on the right.  Negative Patricks test  Left hip has restricted internal rotation  Gait is normal  Skin: Limited skin exam.  no rashes, no digital ulcerations, no alopecia, no tophi, no skin thickening, no nodules  Neuro: CN II-XII grossly intact, Alert, Oriented x 3, moves all four extremities  Psych: normal affect, normal mood, judgement appropriate, follows commands, responses are appropritae  Heme/Lymph: no cervical adenopathy        No results found for: QNTTBGOLD  No results found for: HEPBCORTOT, HEPBCORIGM, HEPBSAG  No results found for: HEPCAB  Lab Results   Component Value Date/Time    SODIUM 136 12/15/2017 01:04 PM    POTASSIUM 3.7 12/15/2017 01:04 PM    CHLORIDE 100 12/15/2017 01:04 PM    CO2 26 12/15/2017 01:04 PM    GLUCOSE 86 12/15/2017 01:04 PM    BUN 15 12/15/2017 01:04 PM    CREATININE 0.76 12/15/2017 01:04 PM      Lab Results   Component Value Date/Time    WBC 4.7 (L)  12/15/2017 01:04 PM    RBC 4.10 (L) 12/15/2017 01:04 PM    HEMOGLOBIN 13.9 12/15/2017 01:04 PM    HEMATOCRIT 40.9 12/15/2017 01:04 PM    MCV 99.8 (H) 12/15/2017 01:04 PM    MCH 33.9 (H) 12/15/2017 01:04 PM    MCHC 34.0 12/15/2017 01:04 PM    MPV 8.8 (L) 12/15/2017 01:04 PM    NEUTSPOLYS 60.70 12/15/2017 01:04 PM    LYMPHOCYTES 25.80 12/15/2017 01:04 PM    MONOCYTES 8.60 12/15/2017 01:04 PM    EOSINOPHILS 3.40 12/15/2017 01:04 PM    BASOPHILS 1.30 12/15/2017 01:04 PM      Lab Results   Component Value Date/Time    CALCIUM 9.5 12/15/2017 01:04 PM    ASTSGOT 19 12/15/2017 01:04 PM    ALTSGPT 14 12/15/2017 01:04 PM    ALKPHOSPHAT 81 12/15/2017 01:04 PM    TBILIRUBIN 1.1 12/15/2017 01:04 PM    ALBUMIN 4.1 12/15/2017 01:04 PM    TOTPROTEIN 6.7 12/15/2017 01:04 PM     Lab Results   Component Value Date/Time    RHEUMFACTN 18 (H) 12/15/2017 01:04 PM    CCPANTIBODY 7 12/15/2017 01:04 PM    ANTINUCAB None Detected 12/15/2017 01:05 PM     No results found for: SEDRATEWES, CREACTPROT  No results found for: RUSSELVIPER, DRVVTINTERP  No results found for: P1EULHTBLDV, M6VKIQULIGA, IGGCARDIOLI, IGMCARDIOLI, IGACARDIOLI, CRYOGLOBULIN  No results found for: ANADIRECT, ANTIDNADS, RNPAB, SMITHAB, HSMLWMG13, SSAROAB, SSBLAAB, GQXHCO1DD, CENTROMBAB  No results found for: ANTIDNADS, DSDNA, AGBMAB, GBMABA, ANCAIGG, [id number], JO1AB, RNPAB, ANTISSASJ, ANTISSBSJ  No results found for: COLORURINE, SPECGRAVITY, PHURINE, GLUCOSEUR, KETONES, PROTEINURIN  No results found for: TOTPROTUR, TOTALVOLUME, [id number]  No results found for: SSA60, SSA52  No results found for: HBA1C  No results found for: CPKTOTAL  No results found for: G6PD  No results found for: DCOI20DPEQ  No results found for: ACESERUM  No results found for: 25HYDROXY  No results found for: TSH, FREEDIR  Lab Results   Component Value Date/Time    TSHULTRASEN 2.150 12/15/2017 01:04 PM     No results found for: MICROSOMALA, ANTITHYROGL  No results found for: IGGLYMEABS  No results  found for: ANTIMITOCHO, FACTIN  No results found for: IGA, TTRANSIGA, ENDOIGA  No results found for: FLTYPE, CRYSTALSBDF  No results found for: ISTATICAL  No results found for: ISTATCREAT  No results found for: CTELOPEP  No results found for: GBMABG  No results found for: PTHINTACT          Assessment and Plan:  Ms. Mirian Jack presents for evaluation of a low titer RF with pain presenting in the medium and large joints - knees, hip etc.    her symptoms of prolonged stiffness in the morning gelling phenomenon would be suggestive of inflammatory disease however the hip is not a typical joint affected by rheumatoid arthritis.  Her rheumatoid factor is a low titer.  I would consider possible spondyloarthropathy although she does not report any back pain.  She may have an undifferentiated spondyloarthropathy.  We can check an HLA-B27.  On exam I did not appreciate any enthesitis.  On examination of her wrist there does not appear to be some chronic changes.  I had like to get x-rays of her hands and feet as well as her wrist on the right side.  Furthermore like to order a hip x-ray as well on the right.    In terms of her workup it seems like the joint affected the greatest is the right hip and she has already tried physical therapy.  If her workup with her sedimentation rate is within normal CK and aldolase are normal I would suspect a possible inflammatory arthropathy and would consider perhaps working up with an MRI.    Also to workup the low positive rheumatoid factor we will check hepatitis panel.    1. Rheumatoid factor positive  HLA-B27    CBC WITH DIFFERENTIAL    COMP METABOLIC PANEL    CRP QUANTITIVE (NON-CARDIAC)    WESTERGREN SED RATE    G6PD QUANT + RBC    HEP C VIRUS ANTIBODY    HEP B CORE AB TOTAL    HEP B SURFACE ANTIGEN    DX-JOINT SURVEY-HANDS SINGLE VIEW    DX-JOINT SURVEY-FEET SINGLE VIEW    DX-HIP-COMPLETE - UNILATERAL 2+ RIGHT    CREATINE KINASE    ALDOLASE   2. Encounter for screening for other  viral diseases   HEP B CORE AB TOTAL       Followup: No Follow-up on file. or sooner prn  Patient was seen 60 minutes face-to-face (excluding time for procedures)  of which more than 50% the time was spent counseling the patient regarding  rheumatological conditions and care. Therapy was discussed in detail.  Thank you for this referral.

## 2018-07-26 ENCOUNTER — HOSPITAL ENCOUNTER (OUTPATIENT)
Dept: LAB | Facility: MEDICAL CENTER | Age: 78
End: 2018-07-26
Attending: INTERNAL MEDICINE
Payer: MEDICARE

## 2018-07-26 ENCOUNTER — APPOINTMENT (OUTPATIENT)
Dept: RADIOLOGY | Facility: IMAGING CENTER | Age: 78
End: 2018-07-26
Attending: INTERNAL MEDICINE
Payer: COMMERCIAL

## 2018-07-26 ENCOUNTER — HOSPITAL ENCOUNTER (OUTPATIENT)
Dept: RADIOLOGY | Facility: MEDICAL CENTER | Age: 78
End: 2018-07-26
Attending: INTERNAL MEDICINE
Payer: MEDICARE

## 2018-07-26 DIAGNOSIS — R76.8 RHEUMATOID FACTOR POSITIVE: ICD-10-CM

## 2018-07-26 DIAGNOSIS — Z11.59 ENCOUNTER FOR SCREENING FOR OTHER VIRAL DISEASES: ICD-10-CM

## 2018-07-26 LAB
BASOPHILS # BLD AUTO: 0.6 % (ref 0–1.8)
BASOPHILS # BLD: 0.02 K/UL (ref 0–0.12)
EOSINOPHIL # BLD AUTO: 0.12 K/UL (ref 0–0.51)
EOSINOPHIL NFR BLD: 3.5 % (ref 0–6.9)
ERYTHROCYTE [DISTWIDTH] IN BLOOD BY AUTOMATED COUNT: 46 FL (ref 35.9–50)
HCT VFR BLD AUTO: 39.2 % (ref 37–47)
HGB BLD-MCNC: 13.4 G/DL (ref 12–16)
IMM GRANULOCYTES # BLD AUTO: 0.01 K/UL (ref 0–0.11)
IMM GRANULOCYTES NFR BLD AUTO: 0.3 % (ref 0–0.9)
LYMPHOCYTES # BLD AUTO: 0.88 K/UL (ref 1–4.8)
LYMPHOCYTES NFR BLD: 25.7 % (ref 22–41)
MCH RBC QN AUTO: 34.2 PG (ref 27–33)
MCHC RBC AUTO-ENTMCNC: 34.2 G/DL (ref 33.6–35)
MCV RBC AUTO: 100 FL (ref 81.4–97.8)
MONOCYTES # BLD AUTO: 0.39 K/UL (ref 0–0.85)
MONOCYTES NFR BLD AUTO: 11.4 % (ref 0–13.4)
NEUTROPHILS # BLD AUTO: 2 K/UL (ref 2–7.15)
NEUTROPHILS NFR BLD: 58.5 % (ref 44–72)
NRBC # BLD AUTO: 0 K/UL
NRBC BLD-RTO: 0 /100 WBC
PLATELET # BLD AUTO: 228 K/UL (ref 164–446)
PMV BLD AUTO: 8.9 FL (ref 9–12.9)
RBC # BLD AUTO: 3.92 M/UL (ref 4.2–5.4)
WBC # BLD AUTO: 3.4 K/UL (ref 4.8–10.8)

## 2018-07-26 PROCEDURE — 73502 X-RAY EXAM HIP UNI 2-3 VIEWS: CPT | Mod: RT

## 2018-07-26 PROCEDURE — 86140 C-REACTIVE PROTEIN: CPT

## 2018-07-26 PROCEDURE — 36415 COLL VENOUS BLD VENIPUNCTURE: CPT

## 2018-07-26 PROCEDURE — 82955 ASSAY OF G6PD ENZYME: CPT

## 2018-07-26 PROCEDURE — 85652 RBC SED RATE AUTOMATED: CPT

## 2018-07-26 PROCEDURE — 85025 COMPLETE CBC W/AUTO DIFF WBC: CPT

## 2018-07-26 PROCEDURE — 86704 HEP B CORE ANTIBODY TOTAL: CPT

## 2018-07-26 PROCEDURE — 80053 COMPREHEN METABOLIC PANEL: CPT

## 2018-07-26 PROCEDURE — 82085 ASSAY OF ALDOLASE: CPT

## 2018-07-26 PROCEDURE — 87340 HEPATITIS B SURFACE AG IA: CPT

## 2018-07-26 PROCEDURE — 86812 HLA TYPING A B OR C: CPT | Mod: GA

## 2018-07-26 PROCEDURE — 82550 ASSAY OF CK (CPK): CPT

## 2018-07-26 PROCEDURE — 86803 HEPATITIS C AB TEST: CPT

## 2018-07-27 LAB
ALBUMIN SERPL BCP-MCNC: 4.6 G/DL (ref 3.2–4.9)
ALBUMIN/GLOB SERPL: 2.1 G/DL
ALP SERPL-CCNC: 79 U/L (ref 30–99)
ALT SERPL-CCNC: 17 U/L (ref 2–50)
ANION GAP SERPL CALC-SCNC: 10 MMOL/L (ref 0–11.9)
AST SERPL-CCNC: 23 U/L (ref 12–45)
BILIRUB SERPL-MCNC: 0.4 MG/DL (ref 0.1–1.5)
BUN SERPL-MCNC: 17 MG/DL (ref 8–22)
CALCIUM SERPL-MCNC: 9.6 MG/DL (ref 8.5–10.5)
CHLORIDE SERPL-SCNC: 106 MMOL/L (ref 96–112)
CK SERPL-CCNC: 123 U/L (ref 0–154)
CO2 SERPL-SCNC: 25 MMOL/L (ref 20–33)
CREAT SERPL-MCNC: 0.71 MG/DL (ref 0.5–1.4)
CRP SERPL HS-MCNC: 1.72 MG/DL (ref 0–0.75)
ERYTHROCYTE [SEDIMENTATION RATE] IN BLOOD BY WESTERGREN METHOD: 6 MM/HOUR (ref 0–30)
GLOBULIN SER CALC-MCNC: 2.2 G/DL (ref 1.9–3.5)
GLUCOSE SERPL-MCNC: 100 MG/DL (ref 65–99)
HBV CORE AB SERPL QL IA: NEGATIVE
HBV SURFACE AG SER QL: NEGATIVE
HCV AB SER QL: NEGATIVE
POTASSIUM SERPL-SCNC: 3.6 MMOL/L (ref 3.6–5.5)
PROT SERPL-MCNC: 6.8 G/DL (ref 6–8.2)
SODIUM SERPL-SCNC: 141 MMOL/L (ref 135–145)

## 2018-07-28 LAB
ALDOLASE SERPL-CCNC: 2.2 U/L (ref 1.5–8.1)
G6PD RBC-CCNC: 12.4 U/G HB (ref 9.9–16.6)
HLA-B27 QL FC: NEGATIVE

## 2018-08-16 ENCOUNTER — HOSPITAL ENCOUNTER (OUTPATIENT)
Dept: RADIOLOGY | Facility: MEDICAL CENTER | Age: 78
End: 2018-08-16
Attending: INTERNAL MEDICINE
Payer: MEDICARE

## 2018-08-16 ENCOUNTER — OFFICE VISIT (OUTPATIENT)
Dept: RHEUMATOLOGY | Facility: PHYSICIAN GROUP | Age: 78
End: 2018-08-16
Payer: MEDICARE

## 2018-08-16 ENCOUNTER — TELEPHONE (OUTPATIENT)
Dept: RHEUMATOLOGY | Facility: PHYSICIAN GROUP | Age: 78
End: 2018-08-16

## 2018-08-16 VITALS
BODY MASS INDEX: 21.16 KG/M2 | RESPIRATION RATE: 16 BRPM | DIASTOLIC BLOOD PRESSURE: 70 MMHG | WEIGHT: 149.6 LBS | OXYGEN SATURATION: 96 % | SYSTOLIC BLOOD PRESSURE: 110 MMHG | HEART RATE: 76 BPM | TEMPERATURE: 97.8 F

## 2018-08-16 DIAGNOSIS — R76.8 RHEUMATOID FACTOR POSITIVE: ICD-10-CM

## 2018-08-16 DIAGNOSIS — M11.20 CHONDROCALCINOSIS: ICD-10-CM

## 2018-08-16 DIAGNOSIS — M16.11 OSTEOARTHRITIS OF RIGHT HIP, UNSPECIFIED OSTEOARTHRITIS TYPE: ICD-10-CM

## 2018-08-16 DIAGNOSIS — R53.83 OTHER FATIGUE: ICD-10-CM

## 2018-08-16 DIAGNOSIS — M25.50 ARTHRALGIA, UNSPECIFIED JOINT: ICD-10-CM

## 2018-08-16 PROCEDURE — 73110 X-RAY EXAM OF WRIST: CPT | Mod: LT

## 2018-08-16 PROCEDURE — 99213 OFFICE O/P EST LOW 20 MIN: CPT | Performed by: INTERNAL MEDICINE

## 2018-08-16 PROCEDURE — 73110 X-RAY EXAM OF WRIST: CPT | Mod: RT

## 2018-08-16 ASSESSMENT — ENCOUNTER SYMPTOMS
MYALGIAS: 0
BLURRED VISION: 0
NECK PAIN: 0
PALPITATIONS: 0
DOUBLE VISION: 0
FEVER: 0

## 2018-08-16 NOTE — LETTER
[facility]   [address], [address]  [place] [address]  Phone: [phone]  Fax: [phone]              Encounter Date: 8/16/2018    Dear Dr. Bailey ref. provider found,    It was a pleasure seeing your patient, Mirian Jack, on 8/16/2018. Diagnoses of Osteoarthritis of right hip, unspecified osteoarthritis type and Rheumatoid factor positive were pertinent to this visit.     Please find attached progress note which includes the history I obtained from Ms. Jack, my physical examination findings, my impression and recommendations.      Once again, it was a pleasure participating in your patient's care.  Please feel free to contact me if you have any questions or if I can be of any further assistance to your patients.      Sincerely,    Liza Tenorio M.D.  Electronically Signed          PROGRESS NOTE:  ESTABLISHED PATIENT    Ms. Mirian Jack is a 77 y.o. female here for follow-up for positive RF    Positive rheumatoid factor  She denies morning stiffness  No joint swelling     Leukopenia with lymphopenia  No recent illness    Hip OA  Open to injection  The last two weeks pain was at a level 8 and on her trip could not step over her bike.  Also started taking 600 mg BID  Also notes increase episodes of fatigue in the last two weeks.        RHEUM HISTORY  Ms. Mirian Jack presents for evaluation for a positive RF and increasing arthralgias.    She reports last June (2017) [place] [employer].  Since she continues to have R knee. She also reports right hip and pain in the left hip and left knee.  She does note pain on the R >. L.  She has tried physical therapy, ibuprofen with minimal relief.       She has tried ibuprofen it does help but does not sleep well and she reports it causes diaphoresis.  She is trying tumeric, MSM.  The patient does report that she has pain in the hips but once she starts biking she does fine.  The pain has limited her exercise.  She also describes a  gelling phenomenon.     She has prolonged morning stiffness.  She reports sicca saymptoms over the last two years (since moving to [place])     She reports a subluxed C2/C3.  About 25 years ago she had some back pain and had surgery.  She does report diarrhea that is associated with taking her vitamins before she eats.   AT her initial appointment she had described increase fatige      pertinent labs  Rheumatoid Factor 18 12/15/2017  hepatitis B surface antigen 7/26/2018  Hepatitis B core antibody negative 7/26/2018  Hepatitis C antibody negative 7/26/2018  G6PD 12.4 7/26/2018  HLA B27 negative 7/26/2018  CCP antibody was negative at 7 /12/15/2017     Past Medical History: R knee replacement.  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  In 2017 she had cataract surgery of the left eye.  In 2014 she had cataract surgery of the right eye.  In 2012 she had a right knee replacement.     Family History: Father had  CVA mother had cardiac arrhythmia children has depression, seizure disorder.  Diabetes in her family history history of a 6th nerve cranial palsy, history of Raynauds     Social History:  Drinks a few glasses a night, continues to teach at [employer]              Current Outpatient Prescriptions on File Prior to Visit   Medication Sig Dispense Refill   • ascorbic acid (ASCORBIC ACID) 500 MG Tab Take 500 mg by mouth every day.     • Turmeric 500 MG Tab Take  by mouth.     • calcium carbonate (RA CALCIUM) 500 MG Tab Take  by mouth.     • cyanocobalamin (VITAMIN B12) 1000 MCG Tab Take  by mouth.     • Cholecalciferol (VITAMIN D PO) Take  by mouth.     • Multiple Vitamin (MULTI-VITAMINS) Tab Take  by mouth.     • b complex vitamins tablet Take  by mouth.       No current facility-administered medications on file prior to visit.        REVIEW OF SYSTEMS  Review of Systems   Constitutional: Negative for fever.   Eyes: Negative for blurred vision  and double vision.   Cardiovascular: Negative for chest pain and palpitations.   Musculoskeletal: Negative for myalgias and neck pain.       PHYSICAL EXAM  Ambulatory Vitals     Vitals:    08/16/18 0857   BP: 110/70   Pulse: 76   Resp: 16   Temp: 36.6 °C (97.8 °F)   SpO2: 96%   Weight: 67.9 kg (149 lb 9.6 oz)         Physical Exam   Constitutional: She is oriented to person, place, and time and well-developed, well-nourished, and in no distress. No distress.   HENT:   Head: Normocephalic and atraumatic.   Right Ear: External ear normal.   Left Ear: External ear normal.   Eyes: Conjunctivae are normal. Right eye exhibits no discharge. Left eye exhibits no discharge. No scleral icterus.   Pulmonary/Chest: Effort normal. No stridor. No respiratory distress.   Musculoskeletal: Normal range of motion. She exhibits no edema.   Bony enlargement of the MCP and the CMC joint.  cystic changes chronic deformities of the wrists bilateral.   Neurological: She is alert and oriented to person, place, and time. Gait normal. GCS score is 15.   Skin: Skin is warm and dry. No rash noted. She is not diaphoretic. No erythema.   Psychiatric: Mood, memory, affect and judgment normal.           DIAGNOSTICS:    Labs    No results found for: QNTTBGOLD  Lab Results   Component Value Date/Time    HEPBCORTOT Negative 07/26/2018 04:56 PM    HEPBSAG Negative 07/26/2018 04:56 PM     Lab Results   Component Value Date/Time    HEPCAB Negative 07/26/2018 04:56 PM     Lab Results   Component Value Date/Time    SODIUM 141 07/26/2018 04:56 PM    POTASSIUM 3.6 07/26/2018 04:56 PM    CHLORIDE 106 07/26/2018 04:56 PM    CO2 25 07/26/2018 04:56 PM    GLUCOSE 100 (H) 07/26/2018 04:56 PM    BUN 17 07/26/2018 04:56 PM    CREATININE 0.71 07/26/2018 04:56 PM      Lab Results   Component Value Date/Time    WBC 3.4 (L) 07/26/2018 04:56 PM    RBC 3.92 (L) 07/26/2018 04:56 PM    HEMOGLOBIN 13.4 07/26/2018 04:56 PM    HEMATOCRIT 39.2 07/26/2018 04:56 PM    .0  (H) 07/26/2018 04:56 PM    MCH 34.2 (H) 07/26/2018 04:56 PM    MCHC 34.2 07/26/2018 04:56 PM    MPV 8.9 (L) 07/26/2018 04:56 PM    NEUTSPOLYS 58.50 07/26/2018 04:56 PM    LYMPHOCYTES 25.70 07/26/2018 04:56 PM    MONOCYTES 11.40 07/26/2018 04:56 PM    EOSINOPHILS 3.50 07/26/2018 04:56 PM    BASOPHILS 0.60 07/26/2018 04:56 PM      Lab Results   Component Value Date/Time    CALCIUM 9.6 07/26/2018 04:56 PM    ASTSGOT 23 07/26/2018 04:56 PM    ALTSGPT 17 07/26/2018 04:56 PM    ALKPHOSPHAT 79 07/26/2018 04:56 PM    TBILIRUBIN 0.4 07/26/2018 04:56 PM    ALBUMIN 4.6 07/26/2018 04:56 PM    TOTPROTEIN 6.8 07/26/2018 04:56 PM     Lab Results   Component Value Date/Time    RHEUMFACTN 18 (H) 12/15/2017 01:04 PM    CCPANTIBODY 7 12/15/2017 01:04 PM    ANTINUCAB None Detected 12/15/2017 01:05 PM     Lab Results   Component Value Date/Time    SEDRATEWES 6 07/26/2018 04:56 PM    CREACTPROT 1.72 (H) 07/26/2018 04:56 PM     No results found for: RUSSELVIPER, DRVVTINTERP  No results found for: L2REBQPZBBM, [id number], IGGCARDIOLI, IGMCARDIOLI, IGACARDIOLI, CRYOGLOBULIN  No results found for: ANADIRECT, ANTIDNADS, RNPAB, SMITHAB, [id number], SSAROAB, SSBLAAB, FGRNFP1KX, CENTROMBAB  No results found for: ANTIDNADS, DSDNA, AGBMAB, GBMABA, ANCAIGG, V8VCIHPCLGF, JO1AB, RNPAB, ANTISSASJ, ANTISSBSJ  No results found for: COLORURINE, SPECGRAVITY, PHURINE, GLUCOSEUR, KETONES, PROTEINURIN  No results found for: TOTPROTUR, TOTALVOLUME, ADGQVEVS36  No results found for: SSA60, SSA52  No results found for: HBA1C  Lab Results   Component Value Date/Time    CPKTOTAL 123 07/26/2018 04:56 PM     Lab Results   Component Value Date/Time    G6PD 12.4 07/26/2018 04:56 PM     Lab Results   Component Value Date/Time    GIUY43CEBI Negative 07/26/2018 04:56 PM     No results found for: ACESERUM  No results found for: 25HYDROXY  No results found for: TSH, FREEDIR  Lab Results   Component Value Date/Time    TSHULTRASEN 2.150 12/15/2017 01:04 PM     No results  found for: MICROSOMALA, ANTITHYROGL  No results found for: IGGLYMEABS  No results found for: ANTIMITOCHO, FACTIN  No results found for: IGA, TTRANSIGA, ENDOIGA  No results found for: FLTYPE, CRYSTALSBDF  No results found for: ISTATICAL  No results found for: ISTATCREAT  No results found for: CTELOPEP  No results found for: GBMABG  No results found for: PTHINTACT          Imaging          ASSESSMENT AND PLAN:  Ms. Mirian Hairston Marcie presents for follow-up of positive RF    Positive RF with negative CCP  Will monitor for joint symptoms  Will get hand and feet and wrist xrays      Hip OA  Will write for guided hip injections      Leukopenia with lymphopenia  Will monitor      1. Osteoarthritis of right hip, unspecified osteoarthritis type  DX-INJECT OR ASPIRATE W/O US GUIDANCE-LARGE JOINT RIGHT   2. Rheumatoid factor positive  DX-WRIST-COMPLETE 3+ LEFT    DX-WRIST-COMPLETE 3+ RIGHT     Return in about 6 weeks (around 9/27/2018).          she agreed and verbalized she agreement and understanding with the current plan. I answered all questions and concerns she has at this time and advised our patient to call at any time in there interim with questions or concerns in regards she care.     Thank you for allowing me to participate in the care of this patient, I will continue to follow closely.      Please note that this dictation was created using voice recognition software. I have made every reasonable attempt to correct obvious errors, but I expect that there are errors of grammar and possibly content that I did not discover before finalizing the note.

## 2018-08-16 NOTE — PROGRESS NOTES
ESTABLISHED PATIENT    Ms. Mirian Jack is a 77 y.o. female here for follow-up for positive RF    Positive rheumatoid factor  She denies morning stiffness  No joint swelling     Leukopenia with lymphopenia  No recent illness    Hip OA  Open to injection  The last two weeks pain was at a level 8 and on her trip could not step over her bike.  Also started taking 600 mg BID  Also notes increase episodes of fatigue in the last two weeks.        RHEUM HISTORY  Ms. Mirian Jack presents for evaluation for a positive RF and increasing arthralgias.    She reports last June (2017) [place] [employer].  Since she continues to have R knee. She also reports right hip and pain in the left hip and left knee.  She does note pain on the R >. L.  She has tried physical therapy, ibuprofen with minimal relief.       She has tried ibuprofen it does help but does not sleep well and she reports it causes diaphoresis.  She is trying tumeric, MSM.  The patient does report that she has pain in the hips but once she starts biking she does fine.  The pain has limited her exercise.  She also describes a gelling phenomenon.     She has prolonged morning stiffness.  She reports sicca saymptoms over the last two years (since moving to [place])     She reports a subluxed C2/C3.  About 25 years ago she had some back pain and had surgery.  She does report diarrhea that is associated with taking her vitamins before she eats.  AT her initial appointment she had described increase fatige      pertinent labs  Rheumatoid Factor 18 12/15/2017  hepatitis B surface antigen 7/26/2018  Hepatitis B core antibody negative 7/26/2018  Hepatitis C antibody negative 7/26/2018  G6PD 12.4 7/26/2018  HLA B27 negative 7/26/2018  CCP antibody was negative at 7 /12/15/2017     Past Medical History: R knee replacement.  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  In 2017 she had cataract  surgery of the left eye.  In 2014 she had cataract surgery of the right eye.  In 2012 she had a right knee replacement.     Family History: Father had  CVA mother had cardiac arrhythmia children has depression, seizure disorder.  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     • Turmeric 500 MG Tab Take  by mouth.     • calcium carbonate (RA CALCIUM) 500 MG Tab Take  by mouth.     • cyanocobalamin (VITAMIN B12) 1000 MCG Tab Take  by mouth.     • Cholecalciferol (VITAMIN D PO) Take  by mouth.     • Multiple Vitamin (MULTI-VITAMINS) Tab Take  by mouth.     • b complex vitamins tablet Take  by mouth.       No current facility-administered medications on file prior to visit.        REVIEW OF SYSTEMS  Review of Systems   Constitutional: Negative for fever.   Eyes: Negative for blurred vision and double vision.   Cardiovascular: Negative for chest pain and palpitations.   Musculoskeletal: Negative for myalgias and neck pain.       PHYSICAL EXAM  Ambulatory Vitals     Vitals:    08/16/18 0857   BP: 110/70   Pulse: 76   Resp: 16   Temp: 36.6 °C (97.8 °F)   SpO2: 96%   Weight: 67.9 kg (149 lb 9.6 oz)         Physical Exam   Constitutional: She is oriented to person, place, and time and well-developed, well-nourished, and in no distress. No distress.   HENT:   Head: Normocephalic and atraumatic.   Right Ear: External ear normal.   Left Ear: External ear normal.   Eyes: Conjunctivae are normal. Right eye exhibits no discharge. Left eye exhibits no discharge. No scleral icterus.   Pulmonary/Chest: Effort normal. No stridor. No respiratory distress.   Musculoskeletal: Normal range of motion. She exhibits no edema.   Bony enlargement  of the MCP and the CMC joint.  cystic changes chronic deformities of the wrists bilateral.   Neurological: She is alert and oriented to person, place, and time. Gait normal. GCS score is 15.   Skin: Skin is warm and dry. No rash noted. She is not diaphoretic. No erythema.   Psychiatric: Mood, memory, affect and judgment normal.           DIAGNOSTICS:    Labs    No results found for: QNTTBGOLD  Lab Results   Component Value Date/Time    HEPBCORTOT Negative 07/26/2018 04:56 PM    HEPBSAG Negative 07/26/2018 04:56 PM     Lab Results   Component Value Date/Time    HEPCAB Negative 07/26/2018 04:56 PM     Lab Results   Component Value Date/Time    SODIUM 141 07/26/2018 04:56 PM    POTASSIUM 3.6 07/26/2018 04:56 PM    CHLORIDE 106 07/26/2018 04:56 PM    CO2 25 07/26/2018 04:56 PM    GLUCOSE 100 (H) 07/26/2018 04:56 PM    BUN 17 07/26/2018 04:56 PM    CREATININE 0.71 07/26/2018 04:56 PM      Lab Results   Component Value Date/Time    WBC 3.4 (L) 07/26/2018 04:56 PM    RBC 3.92 (L) 07/26/2018 04:56 PM    HEMOGLOBIN 13.4 07/26/2018 04:56 PM    HEMATOCRIT 39.2 07/26/2018 04:56 PM    .0 (H) 07/26/2018 04:56 PM    MCH 34.2 (H) 07/26/2018 04:56 PM    MCHC 34.2 07/26/2018 04:56 PM    MPV 8.9 (L) 07/26/2018 04:56 PM    NEUTSPOLYS 58.50 07/26/2018 04:56 PM    LYMPHOCYTES 25.70 07/26/2018 04:56 PM    MONOCYTES 11.40 07/26/2018 04:56 PM    EOSINOPHILS 3.50 07/26/2018 04:56 PM    BASOPHILS 0.60 07/26/2018 04:56 PM      Lab Results   Component Value Date/Time    CALCIUM 9.6 07/26/2018 04:56 PM    ASTSGOT 23 07/26/2018 04:56 PM    ALTSGPT 17 07/26/2018 04:56 PM    ALKPHOSPHAT 79 07/26/2018 04:56 PM    TBILIRUBIN 0.4 07/26/2018 04:56 PM    ALBUMIN 4.6 07/26/2018 04:56 PM    TOTPROTEIN 6.8 07/26/2018 04:56 PM     Lab Results   Component Value Date/Time    RHEUMFACTN 18 (H) 12/15/2017 01:04 PM    CCPANTIBODY 7 12/15/2017 01:04 PM    ANTINUCAB None Detected 12/15/2017 01:05 PM     Lab Results   Component Value Date/Time    WANDY  6 07/26/2018 04:56 PM    CREACTPROT 1.72 (H) 07/26/2018 04:56 PM     No results found for: RUSSELVIPER, DRVVTINTERP  No results found for: [id number], F1TRXBKZTOT, IGGCARDIOLI, IGMCARDIOLI, IGACARDIOLI, CRYOGLOBULIN  No results found for: ANADIRECT, ANTIDNADS, RNPAB, SMITHAB, [id number], SSAROAB, SSBLAAB, COLMAT5CE, CENTROMBAB  No results found for: ANTIDNADS, DSDNA, AGBMAB, GBMABA, ANCAIGG, [id number], JO1AB, RNPAB, ANTISSASJ, ANTISSBSJ  No results found for: COLORURINE, SPECGRAVITY, PHURINE, GLUCOSEUR, KETONES, PROTEINURIN  No results found for: TOTPROTUR, TOTALVOLUME, [id number]  No results found for: SSA60, SSA52  No results found for: HBA1C  Lab Results   Component Value Date/Time    CPKTOTAL 123 07/26/2018 04:56 PM     Lab Results   Component Value Date/Time    G6PD 12.4 07/26/2018 04:56 PM     Lab Results   Component Value Date/Time    [id number] Negative 07/26/2018 04:56 PM     No results found for: ACESERUM  No results found for: 25HYDROXY  No results found for: TSH, FREEDIR  Lab Results   Component Value Date/Time    TSHULTRASEN 2.150 12/15/2017 01:04 PM     No results found for: MICROSOMALA, ANTITHYROGL  No results found for: IGGLYMEABS  No results found for: ANTIMITOCHO, FACTIN  No results found for: IGA, TTRANSIGA, ENDOIGA  No results found for: FLTYPE, CRYSTALSBDF  No results found for: ISTATICAL  No results found for: ISTATCREAT  No results found for: CTELOPEP  No results found for: GBMABG  No results found for: PTHINTACT          Imaging          ASSESSMENT AND PLAN:  Ms. Mirian Hairston Marcie presents for follow-up of positive RF    Positive RF with negative CCP  Will monitor for joint symptoms  Will get hand and feet and wrist xrays      Hip OA  Will write for guided hip injections      Leukopenia with lymphopenia  Will monitor      1. Osteoarthritis of right hip, unspecified osteoarthritis type  DX-INJECT OR ASPIRATE W/O US GUIDANCE-LARGE JOINT RIGHT   2. Rheumatoid factor positive  DX-WRIST-COMPLETE 3+  LEFT    DX-WRIST-COMPLETE 3+ RIGHT     Return in about 6 weeks (around 9/27/2018).          she agreed and verbalized she agreement and understanding with the current plan. I answered all questions and concerns she has at this time and advised our patient to call at any time in there interim with questions or concerns in regards she care.     Thank you for allowing me to participate in the care of this patient, I will continue to follow closely.      Please note that this dictation was created using voice recognition software. I have made every reasonable attempt to correct obvious errors, but I expect that there are errors of grammar and possibly content that I did not discover before finalizing the note.

## 2018-08-20 ENCOUNTER — HOSPITAL ENCOUNTER (OUTPATIENT)
Dept: LAB | Facility: MEDICAL CENTER | Age: 78
End: 2018-08-20
Attending: INTERNAL MEDICINE
Payer: MEDICARE

## 2018-08-20 ENCOUNTER — HOSPITAL ENCOUNTER (OUTPATIENT)
Dept: RADIOLOGY | Facility: MEDICAL CENTER | Age: 78
End: 2018-08-20
Attending: INTERNAL MEDICINE
Payer: MEDICARE

## 2018-08-20 ENCOUNTER — TELEPHONE (OUTPATIENT)
Dept: RHEUMATOLOGY | Facility: PHYSICIAN GROUP | Age: 78
End: 2018-08-20

## 2018-08-20 DIAGNOSIS — M25.50 ARTHRALGIA, UNSPECIFIED JOINT: ICD-10-CM

## 2018-08-20 DIAGNOSIS — M11.20 CHONDROCALCINOSIS: ICD-10-CM

## 2018-08-20 DIAGNOSIS — M16.11 OSTEOARTHRITIS OF RIGHT HIP, UNSPECIFIED OSTEOARTHRITIS TYPE: ICD-10-CM

## 2018-08-20 DIAGNOSIS — R53.83 OTHER FATIGUE: ICD-10-CM

## 2018-08-20 LAB
CALCIUM SERPL-MCNC: 9.5 MG/DL (ref 8.5–10.5)
FERRITIN SERPL-MCNC: 85 NG/ML (ref 10–291)
MAGNESIUM SERPL-MCNC: 2 MG/DL (ref 1.5–2.5)
PTH-INTACT SERPL-MCNC: 65.1 PG/ML (ref 14–72)
TSH SERPL DL<=0.005 MIU/L-ACNC: 2.24 UIU/ML (ref 0.38–5.33)

## 2018-08-20 PROCEDURE — 82728 ASSAY OF FERRITIN: CPT

## 2018-08-20 PROCEDURE — 700101 HCHG RX REV CODE 250

## 2018-08-20 PROCEDURE — 700111 HCHG RX REV CODE 636 W/ 250 OVERRIDE (IP)

## 2018-08-20 PROCEDURE — 700117 HCHG RX CONTRAST REV CODE 255: Performed by: INTERNAL MEDICINE

## 2018-08-20 PROCEDURE — 36415 COLL VENOUS BLD VENIPUNCTURE: CPT

## 2018-08-20 PROCEDURE — 20610 DRAIN/INJ JOINT/BURSA W/O US: CPT | Mod: RT

## 2018-08-20 PROCEDURE — 84443 ASSAY THYROID STIM HORMONE: CPT

## 2018-08-20 PROCEDURE — 83735 ASSAY OF MAGNESIUM: CPT

## 2018-08-20 PROCEDURE — 77002 NEEDLE LOCALIZATION BY XRAY: CPT

## 2018-08-20 PROCEDURE — 83970 ASSAY OF PARATHORMONE: CPT

## 2018-08-20 RX ORDER — TRIAMCINOLONE ACETONIDE 40 MG/ML
INJECTION, SUSPENSION INTRA-ARTICULAR; INTRAMUSCULAR
Status: DISCONTINUED
Start: 2018-08-20 | End: 2018-08-21 | Stop reason: HOSPADM

## 2018-08-20 RX ORDER — LIDOCAINE HYDROCHLORIDE 10 MG/ML
INJECTION, SOLUTION INFILTRATION; PERINEURAL
Status: DISCONTINUED
Start: 2018-08-20 | End: 2018-08-21 | Stop reason: HOSPADM

## 2018-08-20 RX ADMIN — IOHEXOL 5 ML: 300 INJECTION, SOLUTION INTRAVENOUS at 16:30

## 2018-08-20 NOTE — TELEPHONE ENCOUNTER
----- Message from Liza Tenorio M.D. sent at 8/20/2018  6:26 AM PDT -----  Can you relay this message.  ----- Message -----  From: Liza Tenorio M.D.  Sent: 8/20/2018   5:00 AM  To: Mirian GASTON Ms. Mirian Jack    Thanks for getting the wrist xrays done.  On the xray they noted signs of crystal formation.  Most likely it is pseudogout.  I would like to order additional tests for further evaluation.  Would you be able to have them done?  Furthermore can you have the hands and feet xrays done?  I had ordered them at the previous visit.        Liza Tenorio DO

## 2018-08-21 ENCOUNTER — PATIENT MESSAGE (OUTPATIENT)
Dept: RHEUMATOLOGY | Facility: PHYSICIAN GROUP | Age: 78
End: 2018-08-21

## 2018-08-21 NOTE — TELEPHONE ENCOUNTER
From: Mirian Jack  To: Liza Tenorio M.D.  Sent: 8/21/2018 8:03 AM PDT  Subject: RE:(No subject)    I had the labs done. They did not have an order for the set and hands. I had the hip injection yesterday and it was really helpful. The nova wine had an immediate impact. It is not as good today but I know it was the right thing. I have not taken any Motrin. My son in law saw my wrist joyce and he was shocked I was not having more pain. 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 I have pain with weight bearing on the wrists in any yoga and bike riding but overall the pain in my wrist did not match my hip pain. Thank you for your help in trying to figure out how to help me. Mirian  ----- Message -----  From: Liza Tenorio M.D.  Sent: 8/16/2018 7:18 PM PDT  To: Mirian Jack  Subject: (No subject)  HI Ms. Vela Marika Jack    Thanks for getting the wrist xrays done. On the xray they noted signs of crystal formation. Most likely it is pseudogout. I would like to order additional tests for further evaluation. Would you be able to have them done? Furthermore can you have the hands and feet xrays done? I had ordered them at the previous visit.        Liza Tenorio DO

## 2018-10-11 ENCOUNTER — OFFICE VISIT (OUTPATIENT)
Dept: RHEUMATOLOGY | Facility: MEDICAL CENTER | Age: 78
End: 2018-10-11
Payer: COMMERCIAL

## 2018-10-11 ENCOUNTER — HOSPITAL ENCOUNTER (OUTPATIENT)
Dept: RADIOLOGY | Facility: MEDICAL CENTER | Age: 78
End: 2018-10-11
Attending: INTERNAL MEDICINE
Payer: MEDICARE

## 2018-10-11 ENCOUNTER — HOSPITAL ENCOUNTER (OUTPATIENT)
Dept: LAB | Facility: MEDICAL CENTER | Age: 78
End: 2018-10-11
Attending: INTERNAL MEDICINE
Payer: MEDICARE

## 2018-10-11 VITALS
WEIGHT: 149 LBS | SYSTOLIC BLOOD PRESSURE: 132 MMHG | RESPIRATION RATE: 14 BRPM | OXYGEN SATURATION: 94 % | BODY MASS INDEX: 21.08 KG/M2 | HEART RATE: 88 BPM | TEMPERATURE: 97.6 F | DIASTOLIC BLOOD PRESSURE: 70 MMHG

## 2018-10-11 DIAGNOSIS — G89.29 CHRONIC LOW BACK PAIN, UNSPECIFIED BACK PAIN LATERALITY, WITH SCIATICA PRESENCE UNSPECIFIED: ICD-10-CM

## 2018-10-11 DIAGNOSIS — M54.5 CHRONIC LOW BACK PAIN, UNSPECIFIED BACK PAIN LATERALITY, WITH SCIATICA PRESENCE UNSPECIFIED: ICD-10-CM

## 2018-10-11 DIAGNOSIS — R76.8 RHEUMATOID FACTOR POSITIVE: ICD-10-CM

## 2018-10-11 DIAGNOSIS — R29.890 HEIGHT LOSS: ICD-10-CM

## 2018-10-11 DIAGNOSIS — M89.9 DISORDER OF BONE: ICD-10-CM

## 2018-10-11 PROCEDURE — 86812 HLA TYPING A B OR C: CPT | Mod: GA

## 2018-10-11 PROCEDURE — 72070 X-RAY EXAM THORAC SPINE 2VWS: CPT

## 2018-10-11 PROCEDURE — 36415 COLL VENOUS BLD VENIPUNCTURE: CPT | Mod: GA

## 2018-10-11 PROCEDURE — 99214 OFFICE O/P EST MOD 30 MIN: CPT | Performed by: INTERNAL MEDICINE

## 2018-10-11 ASSESSMENT — ENCOUNTER SYMPTOMS
DOUBLE VISION: 0
NECK PAIN: 1
MYALGIAS: 0
PALPITATIONS: 0
BACK PAIN: 1
FEVER: 0
BLURRED VISION: 0

## 2018-10-11 NOTE — PROGRESS NOTES
ESTABLISHED PATIENT    Ms. Mirian Jack is a 77 y.o. female here for follow-up for positive RF    Positive rheumatoid factor  She denies morning stiffness  No joint swelling    CPPD  Noted on xray  Ferritin, magnesium was normal     Leukopenia with lymphopenia  No recent illness    Hip OA  Had injection and felt it was better but it only lasted 3 weeks and now pain is back but not as bad.  Added chondroitin sulfate and notes mild improvement.    Back pain  She has a history of back pain.  ALso reports a history of C2-C3 subluxation but states that is secondary to trauma.  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  She notes back pain is worse in the morning but does not last for hours.      RHEUM HISTORY  Ms. Mirian Jack presents for evaluation for a positive RF and increasing arthralgias.    She reports last June (2017) she fell in the [employer].  Since she continues to have R knee. She also reports right hip and pain in the left hip and left knee.  She does note pain on the R >. L.  She has tried physical therapy, ibuprofen with minimal relief.       She has tried ibuprofen it does help but does not sleep well and she reports it causes diaphoresis.  She is trying tumeric, MSM.  The patient does report that she has pain in the hips but once she starts biking she does fine.  The pain has limited her exercise.  She also describes a gelling phenomenon.     She has prolonged morning stiffness.  She reports sicca saymptoms over the last two years (since moving to [place])     She reports a subluxed C2/C3.  About 25 years ago she had some back pain and had surgery.  She does report diarrhea that is associated with taking her vitamins before she eats.  AT her initial appointment she had described increase fatige      pertinent labs  Rheumatoid Factor 18 12/15/2017  hepatitis B surface antigen 7/26/2018  Hepatitis B core antibody negative 7/26/2018  Hepatitis C antibody negative  7/26/2018  G6PD 12.4 7/26/2018  HLA B27 negative 7/26/2018  CCP antibody was negative at 7 /12/15/2017     Past Medical History: R knee replacement.  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  In 2017 she had cataract surgery of the left eye.  In 2014 she had cataract surgery of the right eye.  In 2012 she had a right knee replacement.     Family History: Father had  CVA mother had cardiac arrhythmia children has depression, seizure disorder.  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     • ascorbic acid (ASCORBIC ACID) 500 MG Tab Take 500 mg by mouth every day.     • Turmeric 500 MG Tab Take  by mouth.     • calcium carbonate (RA CALCIUM) 500 MG Tab Take  by mouth.     • cyanocobalamin (VITAMIN B12) 1000 MCG Tab Take  by mouth.     • Cholecalciferol (VITAMIN D PO) Take  by mouth.     • Multiple Vitamin (MULTI-VITAMINS) Tab Take  by mouth.     • b complex vitamins tablet Take  by mouth.       No current facility-administered medications on file prior to visit.        REVIEW OF SYSTEMS  Review of Systems   Constitutional: Negative for fever.   Eyes: Negative for blurred vision and double vision.   Cardiovascular: Negative for chest pain and palpitations.   Musculoskeletal: Positive for back pain, joint pain and neck pain. Negative for myalgias.   All other systems reviewed and are negative.      PHYSICAL EXAM  Ambulatory Vitals     Vitals:    10/11/18 0740   BP: 132/70   BP Location: Right arm   Patient Position: Sitting   BP Cuff Size: Adult   Pulse: 88   Resp: 14   Temp: 36.4 °C (97.6 °F)   TempSrc: Temporal   SpO2: 94%   Weight: 67.6 kg (149 lb)          Physical Exam   Constitutional: She is oriented to person, place, and time and well-developed, well-nourished, and in no distress. No distress.   HENT:   Head: Normocephalic and atraumatic.   Right Ear: External ear normal.   Left Ear: External ear normal.   Eyes: Conjunctivae are normal. Right eye exhibits no discharge. Left eye exhibits no discharge. No scleral icterus.   Cardiovascular: Normal rate, regular rhythm and normal heart sounds.    Pulmonary/Chest: Effort normal. No stridor. No respiratory distress. She has no wheezes. She has no rales.   Musculoskeletal: Normal range of motion. She exhibits no edema.   Bony enlargement of the MCP and the CMC joint.  cystic changes chronic deformities of the wrists bilateral.  schoeber change 2cm on flexion; negative occiput to wall testing   Neurological: She is alert and oriented to person, place, and time. Gait normal. GCS score is 15.   Skin: Skin is warm and dry. No rash noted. She is not diaphoretic. No erythema.   Psychiatric: Mood, memory, affect and judgment normal.   Vitals reviewed.          DIAGNOSTICS:    Labs    No results found for: QNTTBGOLD  Lab Results   Component Value Date/Time    HEPBCORTOT Negative 07/26/2018 04:56 PM    HEPBSAG Negative 07/26/2018 04:56 PM     Lab Results   Component Value Date/Time    HEPCAB Negative 07/26/2018 04:56 PM     Lab Results   Component Value Date/Time    SODIUM 141 07/26/2018 04:56 PM    POTASSIUM 3.6 07/26/2018 04:56 PM    CHLORIDE 106 07/26/2018 04:56 PM    CO2 25 07/26/2018 04:56 PM    GLUCOSE 100 (H) 07/26/2018 04:56 PM    BUN 17 07/26/2018 04:56 PM    CREATININE 0.71 07/26/2018 04:56 PM      Lab Results   Component Value Date/Time    WBC 3.4 (L) 07/26/2018 04:56 PM    RBC 3.92 (L) 07/26/2018 04:56 PM    HEMOGLOBIN 13.4 07/26/2018 04:56 PM    HEMATOCRIT 39.2 07/26/2018 04:56 PM    .0 (H) 07/26/2018 04:56 PM    MCH 34.2 (H) 07/26/2018 04:56 PM    MCHC 34.2 07/26/2018 04:56 PM    MPV 8.9 (L)  07/26/2018 04:56 PM    NEUTSPOLYS 58.50 07/26/2018 04:56 PM    LYMPHOCYTES 25.70 07/26/2018 04:56 PM    MONOCYTES 11.40 07/26/2018 04:56 PM    EOSINOPHILS 3.50 07/26/2018 04:56 PM    BASOPHILS 0.60 07/26/2018 04:56 PM      Lab Results   Component Value Date/Time    CALCIUM 9.5 08/20/2018 03:06 PM    ASTSGOT 23 07/26/2018 04:56 PM    ALTSGPT 17 07/26/2018 04:56 PM    ALKPHOSPHAT 79 07/26/2018 04:56 PM    TBILIRUBIN 0.4 07/26/2018 04:56 PM    ALBUMIN 4.6 07/26/2018 04:56 PM    TOTPROTEIN 6.8 07/26/2018 04:56 PM     Lab Results   Component Value Date/Time    RHEUMFACTN 18 (H) 12/15/2017 01:04 PM    CCPANTIBODY 7 12/15/2017 01:04 PM    ANTINUCAB None Detected 12/15/2017 01:05 PM     Lab Results   Component Value Date/Time    SEDRATEWES 6 07/26/2018 04:56 PM    CREACTPROT 1.72 (H) 07/26/2018 04:56 PM     No results found for: RUSSELVIPER, DRVVTINTERP  No results found for: G8NTEMZRTDE, I5TPQFDWFVZ, IGGCARDIOLI, IGMCARDIOLI, IGACARDIOLI, CRYOGLOBULIN  No results found for: ANADIRECT, ANTIDNADS, RNPAB, SMITHAB, SCZJPZK08, SSAROAB, SSBLAAB, FRRBNO8QB, CENTROMBAB  No results found for: ANTIDNADS, DSDNA, AGBMAB, GBMABA, ANCAIGG, U0XLUHMGAWG, JO1AB, RNPAB, ANTISSASJ, ANTISSBSJ  No results found for: COLORURINE, SPECGRAVITY, PHURINE, GLUCOSEUR, KETONES, PROTEINURIN  No results found for: TOTPROTUR, TOTALVOLUME, [id number]  No results found for: SSA60, SSA52  No results found for: HBA1C  Lab Results   Component Value Date/Time    CPKTOTAL 123 07/26/2018 04:56 PM     Lab Results   Component Value Date/Time    G6PD 12.4 07/26/2018 04:56 PM     Lab Results   Component Value Date/Time    CPHU72OWEH Negative 07/26/2018 04:56 PM     No results found for: ACESERUM  No results found for: 25HYDROXY  No results found for: TSH, FREEDIR  Lab Results   Component Value Date/Time    TSHULTRASEN 2.240 08/20/2018 03:06 PM     No results found for: MICROSOMALA, ANTITHYROGL  No results found for: IGGLYMEABS  No results found for: ANTIMITOCHO,  FACTIN  No results found for: IGA, TTRANSIGA, ENDOIGA  No results found for: FLTYPE, CRYSTALSBDF  No results found for: ISTATICAL  No results found for: ISTATCREAT  No results found for: CTELOPEP  No results found for: GBMABG  Lab Results   Component Value Date/Time    PTHINTACT 65.1 08/20/2018 03:06 PM             Imaging          ASSESSMENT AND PLAN:  Ms. Mirian Hairston Bykellen presents for follow-up of positive RF    Positive RF with negative CCP  Will monitor for joint symptoms  Hand and feet xrays not completed  However she has a subluxed C2-3 which she explains was secondary to trauma.  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  Query if she has a low titer RF but really has symptoms of inflammatory spinal diseaes.  Will get thoracic xray.  We will get old records of neck and lumbar spine xray.      CPPD  Work-up is negative    Hip OA  Would like an injection in the winter   Discussed she would need to be evaluated and discuss with her new rheumatologist      Leukopenia with lymphopenia  Will monitor    Osteopenia  DEXA      1. Chronic low back pain, unspecified back pain laterality, with sciatica presence unspecified  HLA-B27    DS-BONE DENSITY STUDY (DEXA)    DX-THORACIC SPINE-2 VIEWS   2. Height loss  HLA-B27    DS-BONE DENSITY STUDY (DEXA)    DX-THORACIC SPINE-2 VIEWS   3. Rheumatoid factor positive  HLA-B27    DS-BONE DENSITY STUDY (DEXA)    DX-THORACIC SPINE-2 VIEWS   4. Disorder of bone   DS-BONE DENSITY STUDY (DEXA)     Return in about 2 months (around 12/11/2018).          she agreed and verbalized she agreement and understanding with the current plan. I answered all questions and concerns she has at this time and advised our patient to call at any time in there interim with questions or concerns in regards she care.     Thank you for allowing me to participate in the care of this patient, I will continue to follow closely.      Please note that this dictation was created using voice  recognition software. I have made every reasonable attempt to correct obvious errors, but I expect that there are errors of grammar and possibly content that I did not discover before finalizing the note.

## 2018-10-11 NOTE — LETTER
[facility]   [address] [facility], [address]  [place] [address]  Phone: [phone]  Fax: [phone]              Encounter Date: 10/11/2018    Dear Dr. Bailey ref. provider found,    It was a pleasure seeing your patient, Mirian Jack, on 10/11/2018. Diagnoses of Chronic low back pain, unspecified back pain laterality, with sciatica presence unspecified, Height loss, Rheumatoid factor positive, and Disorder of bone  were pertinent to this visit.     Please find attached progress note which includes the history I obtained from Ms. Jack, my physical examination findings, my impression and recommendations.      Once again, it was a pleasure participating in your patient's care.  Please feel free to contact me if you have any questions or if I can be of any further assistance to your patients.      Sincerely,    Liza Tenorio M.D.  Electronically Signed          PROGRESS NOTE:  ESTABLISHED PATIENT    Ms. Mirian Jack is a 77 y.o. female here for follow-up for positive RF    Positive rheumatoid factor  She denies morning stiffness  No joint swelling    CPPD  Noted on xray  Ferritin, magnesium was normal     Leukopenia with lymphopenia  No recent illness    Hip OA  Had injection and felt it was better but it only lasted 3 weeks and now pain is back but not as bad.  Added chondroitin sulfate and notes mild improvement.    Back pain  She has a history of back pain.  ALso reports a history of C2-C3 subluxation but states that is secondary to trauma.  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  She notes back pain is worse in the morning but does not last for hours.      RHEUM HISTORY  Ms. Mirian Jack presents for evaluation for a positive RF and increasing arthralgias.    She reports last June (2017) she fell in the [employer].  Since she continues to have R knee. She also reports right hip and pain in the left hip and left knee.  She does note pain on the R >. L.  She  has tried physical therapy, ibuprofen with minimal relief.       She has tried ibuprofen it does help but does not sleep well and she reports it causes diaphoresis.  She is trying tumeric, MSM.  The patient does report that she has pain in the hips but once she starts biking she does fine.  The pain has limited her exercise.  She also describes a gelling phenomenon.     She has prolonged morning stiffness.  She reports sicca saymptoms over the last two years (since moving to [place])     She reports a subluxed C2/C3.  About 25 years ago she had some back pain and had surgery.  She does report diarrhea that is associated with taking her vitamins before she eats.  AT her initial appointment she had described increase fatige      pertinent labs  Rheumatoid Factor 18 12/15/2017  hepatitis B surface antigen 7/26/2018  Hepatitis B core antibody negative 7/26/2018  Hepatitis C antibody negative 7/26/2018  G6PD 12.4 7/26/2018  HLA B27 negative 7/26/2018  CCP antibody was negative at 7 /12/15/2017     Past Medical History: R knee replacement.  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  In 2017 she had cataract surgery of the left eye.  In 2014 she had cataract surgery of the right eye.  In 2012 she had a right knee replacement.     Family History: Father had  CVA mother had cardiac arrhythmia children has depression, seizure disorder.  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     • ascorbic acid (ASCORBIC ACID) 500 MG Tab Take 500 mg by mouth every day.     • Turmeric 500 MG Tab Take  by mouth.     • calcium carbonate (RA CALCIUM) 500 MG Tab Take  by mouth.     •  cyanocobalamin (VITAMIN B12) 1000 MCG Tab Take  by mouth.     • Cholecalciferol (VITAMIN D PO) Take  by mouth.     • Multiple Vitamin (MULTI-VITAMINS) Tab Take  by mouth.     • b complex vitamins tablet Take  by mouth.       No current facility-administered medications on file prior to visit.        REVIEW OF SYSTEMS  Review of Systems   Constitutional: Negative for fever.   Eyes: Negative for blurred vision and double vision.   Cardiovascular: Negative for chest pain and palpitations.   Musculoskeletal: Positive for back pain, joint pain and neck pain. Negative for myalgias.   All other systems reviewed and are negative.      PHYSICAL EXAM  Ambulatory Vitals     Vitals:    10/11/18 0740   BP: 132/70   BP Location: Right arm   Patient Position: Sitting   BP Cuff Size: Adult   Pulse: 88   Resp: 14   Temp: 36.4 °C (97.6 °F)   TempSrc: Temporal   SpO2: 94%   Weight: 67.6 kg (149 lb)         Physical Exam   Constitutional: She is oriented to person, place, and time and well-developed, well-nourished, and in no distress. No distress.   HENT:   Head: Normocephalic and atraumatic.   Right Ear: External ear normal.   Left Ear: External ear normal.   Eyes: Conjunctivae are normal. Right eye exhibits no discharge. Left eye exhibits no discharge. No scleral icterus.   Cardiovascular: Normal rate, regular rhythm and normal heart sounds.    Pulmonary/Chest: Effort normal. No stridor. No respiratory distress. She has no wheezes. She has no rales.   Musculoskeletal: Normal range of motion. She exhibits no edema.   Bony enlargement of the MCP and the CMC joint.  cystic changes chronic deformities of the wrists bilateral.  schoeber change 2cm on flexion; negative occiput to wall testing   Neurological: She is alert and oriented to person, place, and time. Gait normal. GCS score is 15.   Skin: Skin is warm and dry. No rash noted. She is not diaphoretic. No erythema.   Psychiatric: Mood, memory, affect and judgment normal.   Vitals  reviewed.          DIAGNOSTICS:    Labs    No results found for: QNTTBGOLD  Lab Results   Component Value Date/Time    HEPBCORTOT Negative 07/26/2018 04:56 PM    HEPBSAG Negative 07/26/2018 04:56 PM     Lab Results   Component Value Date/Time    HEPCAB Negative 07/26/2018 04:56 PM     Lab Results   Component Value Date/Time    SODIUM 141 07/26/2018 04:56 PM    POTASSIUM 3.6 07/26/2018 04:56 PM    CHLORIDE 106 07/26/2018 04:56 PM    CO2 25 07/26/2018 04:56 PM    GLUCOSE 100 (H) 07/26/2018 04:56 PM    BUN 17 07/26/2018 04:56 PM    CREATININE 0.71 07/26/2018 04:56 PM      Lab Results   Component Value Date/Time    WBC 3.4 (L) 07/26/2018 04:56 PM    RBC 3.92 (L) 07/26/2018 04:56 PM    HEMOGLOBIN 13.4 07/26/2018 04:56 PM    HEMATOCRIT 39.2 07/26/2018 04:56 PM    .0 (H) 07/26/2018 04:56 PM    MCH 34.2 (H) 07/26/2018 04:56 PM    MCHC 34.2 07/26/2018 04:56 PM    MPV 8.9 (L) 07/26/2018 04:56 PM    NEUTSPOLYS 58.50 07/26/2018 04:56 PM    LYMPHOCYTES 25.70 07/26/2018 04:56 PM    MONOCYTES 11.40 07/26/2018 04:56 PM    EOSINOPHILS 3.50 07/26/2018 04:56 PM    BASOPHILS 0.60 07/26/2018 04:56 PM      Lab Results   Component Value Date/Time    CALCIUM 9.5 08/20/2018 03:06 PM    ASTSGOT 23 07/26/2018 04:56 PM    ALTSGPT 17 07/26/2018 04:56 PM    ALKPHOSPHAT 79 07/26/2018 04:56 PM    TBILIRUBIN 0.4 07/26/2018 04:56 PM    ALBUMIN 4.6 07/26/2018 04:56 PM    TOTPROTEIN 6.8 07/26/2018 04:56 PM     Lab Results   Component Value Date/Time    RHEUMFACTN 18 (H) 12/15/2017 01:04 PM    CCPANTIBODY 7 12/15/2017 01:04 PM    ANTINUCAB None Detected 12/15/2017 01:05 PM     Lab Results   Component Value Date/Time    WANDY 6 07/26/2018 04:56 PM    CREACTPROT 1.72 (H) 07/26/2018 04:56 PM     No results found for: RUSSELVIPER, DRVVTINTERP  No results found for: K2EEWWPVHCM, Y6SPPMNYUGI, IGGCARDIOLI, IGMCARDIOLI, IGACARDIOLI, CRYOGLOBULIN  No results found for: ANADIRECT, ANTIDNADS, RNPAB, SMITHAB, [id number], SSAROAB, SSBLAAB, QGEBII1SB,  CENTROMBAB  No results found for: ANTIDNADS, DSDNA, AGBMAB, GBMABA, ANCAIGG, [id number], JO1AB, RNPAB, ANTISSASJ, ANTISSBSJ  No results found for: COLORURINE, SPECGRAVITY, PHURINE, GLUCOSEUR, KETONES, PROTEINURIN  No results found for: TOTPROTUR, TOTALVOLUME, [id number]  No results found for: SSA60, SSA52  No results found for: HBA1C  Lab Results   Component Value Date/Time    CPKTOTAL 123 07/26/2018 04:56 PM     Lab Results   Component Value Date/Time    G6PD 12.4 07/26/2018 04:56 PM     Lab Results   Component Value Date/Time    HQAF27YQVQ Negative 07/26/2018 04:56 PM     No results found for: ACESERUM  No results found for: 25HYDROXY  No results found for: TSH, FREEDIR  Lab Results   Component Value Date/Time    TSHULTRASEN 2.240 08/20/2018 03:06 PM     No results found for: MICROSOMALA, ANTITHYROGL  No results found for: IGGLYMEABS  No results found for: ANTIMITOCHO, FACTIN  No results found for: IGA, TTRANSIGA, ENDOIGA  No results found for: FLTYPE, CRYSTALSBDF  No results found for: ISTATICAL  No results found for: ISTATCREAT  No results found for: CTELOPEP  No results found for: GBMABG  Lab Results   Component Value Date/Time    PTHINTACT 65.1 08/20/2018 03:06 PM             Imaging          ASSESSMENT AND PLAN:  Ms. Mirian Hairston Bykellen presents for follow-up of positive RF    Positive RF with negative CCP  Will monitor for joint symptoms  Hand and feet xrays not completed  However she has a subluxed C2-3 which she explains was secondary to trauma.  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  Query if she has a low titer RF but really has symptoms of inflammatory spinal diseaes.  Will get thoracic xray.  We will get old records of neck and lumbar spine xray.      CPPD  Work-up is negative    Hip OA  Would like an injection in the winter   Discussed she would need to be evaluated and discuss with her new rheumatologist      Leukopenia with lymphopenia  Will monitor    Osteopenia  DEXA      1.  Chronic low back pain, unspecified back pain laterality, with sciatica presence unspecified  HLA-B27    DS-BONE DENSITY STUDY (DEXA)    DX-THORACIC SPINE-2 VIEWS   2. Height loss  HLA-B27    DS-BONE DENSITY STUDY (DEXA)    DX-THORACIC SPINE-2 VIEWS   3. Rheumatoid factor positive  HLA-B27    DS-BONE DENSITY STUDY (DEXA)    DX-THORACIC SPINE-2 VIEWS   4. Disorder of bone   DS-BONE DENSITY STUDY (DEXA)     Return in about 2 months (around 12/11/2018).          she agreed and verbalized she agreement and understanding with the current plan. I answered all questions and concerns she has at this time and advised our patient to call at any time in there interim with questions or concerns in regards she care.     Thank you for allowing me to participate in the care of this patient, I will continue to follow closely.      Please note that this dictation was created using voice recognition software. I have made every reasonable attempt to correct obvious errors, but I expect that there are errors of grammar and possibly content that I did not discover before finalizing the note.

## 2018-10-12 LAB — HLA-B27 QL FC: NEGATIVE

## 2018-10-18 ENCOUNTER — TELEPHONE (OUTPATIENT)
Dept: RHEUMATOLOGY | Facility: MEDICAL CENTER | Age: 78
End: 2018-10-18

## 2018-10-18 ENCOUNTER — HOSPITAL ENCOUNTER (OUTPATIENT)
Dept: RADIOLOGY | Facility: MEDICAL CENTER | Age: 78
End: 2018-10-18
Attending: INTERNAL MEDICINE
Payer: MEDICARE

## 2018-10-18 DIAGNOSIS — M54.5 CHRONIC LOW BACK PAIN, UNSPECIFIED BACK PAIN LATERALITY, WITH SCIATICA PRESENCE UNSPECIFIED: ICD-10-CM

## 2018-10-18 DIAGNOSIS — R29.890 HEIGHT LOSS: ICD-10-CM

## 2018-10-18 DIAGNOSIS — M89.9 DISORDER OF BONE: ICD-10-CM

## 2018-10-18 DIAGNOSIS — G89.29 CHRONIC LOW BACK PAIN, UNSPECIFIED BACK PAIN LATERALITY, WITH SCIATICA PRESENCE UNSPECIFIED: ICD-10-CM

## 2018-10-18 DIAGNOSIS — R76.8 RHEUMATOID FACTOR POSITIVE: ICD-10-CM

## 2018-10-18 PROCEDURE — 77080 DXA BONE DENSITY AXIAL: CPT

## 2018-10-18 NOTE — TELEPHONE ENCOUNTER
Please notify patient that we received the results of her bone density scan and she has now slipped over into the realm of osteoporosis    Patient has a follow-up appoint with me Dr. Salazar he December 2018, let's see if we can get her in sooner so we can start treatment     continue calcium 1200 mg by mouth daily and vitamin D about 1000 units by mouth daily and magnesium 400 mg by mouth daily

## 2018-10-23 ENCOUNTER — HOSPITAL ENCOUNTER (OUTPATIENT)
Dept: LAB | Facility: MEDICAL CENTER | Age: 78
End: 2018-10-23
Attending: INTERNAL MEDICINE
Payer: MEDICARE

## 2018-10-23 ENCOUNTER — OFFICE VISIT (OUTPATIENT)
Dept: RHEUMATOLOGY | Facility: MEDICAL CENTER | Age: 78
End: 2018-10-23
Payer: MEDICARE

## 2018-10-23 ENCOUNTER — NON-PROVIDER VISIT (OUTPATIENT)
Dept: MEDICAL GROUP | Facility: MEDICAL CENTER | Age: 78
End: 2018-10-23
Payer: COMMERCIAL

## 2018-10-23 VITALS
OXYGEN SATURATION: 95 % | DIASTOLIC BLOOD PRESSURE: 60 MMHG | TEMPERATURE: 97.5 F | BODY MASS INDEX: 20.94 KG/M2 | WEIGHT: 148 LBS | SYSTOLIC BLOOD PRESSURE: 120 MMHG | HEART RATE: 82 BPM | RESPIRATION RATE: 14 BRPM

## 2018-10-23 DIAGNOSIS — M15.9 PRIMARY OSTEOARTHRITIS INVOLVING MULTIPLE JOINTS: ICD-10-CM

## 2018-10-23 DIAGNOSIS — Z23 NEED FOR VACCINATION: ICD-10-CM

## 2018-10-23 DIAGNOSIS — E55.9 VITAMIN D DEFICIENCY: ICD-10-CM

## 2018-10-23 DIAGNOSIS — M81.0 SENILE OSTEOPOROSIS: ICD-10-CM

## 2018-10-23 PROCEDURE — 36415 COLL VENOUS BLD VENIPUNCTURE: CPT

## 2018-10-23 PROCEDURE — 99213 OFFICE O/P EST LOW 20 MIN: CPT | Performed by: INTERNAL MEDICINE

## 2018-10-23 PROCEDURE — 90471 IMMUNIZATION ADMIN: CPT | Performed by: FAMILY MEDICINE

## 2018-10-23 PROCEDURE — 82306 VITAMIN D 25 HYDROXY: CPT

## 2018-10-23 PROCEDURE — 90662 IIV NO PRSV INCREASED AG IM: CPT | Performed by: FAMILY MEDICINE

## 2018-10-23 NOTE — PROGRESS NOTES
"Mirian Jack is a 77 y.o. female here for a non-provider visit for:   FLU    Reason for immunization: Annual Flu Vaccine  Immunization records indicate need for vaccine: Yes, confirmed with Epic  Minimum interval has been met for this vaccine: Yes  ABN completed: Not Indicated    Order and dose verified by: MIKE MARTIN Dated  08/07/2015 was given to patient: Yes  All IAC Questionnaire questions were answered \"No.\"    Patient tolerated injection and no adverse effects were observed or reported: Yes    Pt scheduled for next dose in series: No  "

## 2018-10-23 NOTE — LETTER
[facility]   [address] [place] [address], [address]  [place] [address]  Phone: [phone]  Fax: [phone]              Encounter Date: 10/23/2018    Dear Dr. Bailey ref. provider found,    It was a pleasure seeing your patient, Mirian Jack, on 10/23/2018. Diagnoses of Primary osteoarthritis involving multiple joints, Senile osteoporosis, and Vitamin D deficiency were pertinent to this visit.     Please find attached progress note which includes the history I obtained from Ms. Jack, my physical examination findings, my impression and recommendations.      Once again, it was a pleasure participating in your patient's care.  Please feel free to contact me if you have any questions or if I can be of any further assistance to your patients.      Sincerely,    Tomasa Salazar M.D.  Electronically Signed          PROGRESS NOTE:  No notes on file

## 2018-10-24 LAB — 25(OH)D3 SERPL-MCNC: 28 NG/ML (ref 30–100)

## 2018-10-24 NOTE — PROGRESS NOTES
Chief Complaint- joint pain    Subjective:   Mirian Jack is a 77 y.o. 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 Tenorio who is no longer in this clinic for osteoarthritis and osteoporosis.  Patient just recently status post a bone density scan is here to follow-up.    Additional comorbidities include hip osteoarthritis, patient gets corticosteroid injections in the right hip but apparently only last 4 weeks, patient status post evaluation for hip replacement for which patient is resisting, patient also has C-spine DJD/DDD.  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 with vision on the right compromised and lack of labyrinth function on the right ear    hepatitis B surface antigen 7/26/2018  Hepatitis B core antibody negative 7/26/2018  Hepatitis C antibody negative 7/26/2018  G6PD 12.4 7/26/2018  HLA B27 negative 7/26/2018  Rheumatoid Factor 18 12/15/2017  CCP antibody was negative at 7 /12/15/2017  DEXA 10/18/2018 T scores -2.6, -0.4  FRAX 10/18/2018 major osteoporotic fracture risk 12.4%, hip fracture risk 2.5%    Current medicines (including changes today)  Current Outpatient Prescriptions   Medication Sig Dispense Refill   • Probiotic Product (SOLUBLE FIBER/PROBIOTICS PO) Take  by mouth.     • ascorbic acid (ASCORBIC ACID) 500 MG Tab Take 500 mg by mouth every day.     • Turmeric 500 MG Tab Take  by mouth.     • calcium carbonate (RA CALCIUM) 500 MG Tab Take  by mouth.     • cyanocobalamin (VITAMIN B12) 1000 MCG Tab Take  by mouth.     • Cholecalciferol (VITAMIN D PO) Take  by mouth.     • Multiple Vitamin (MULTI-VITAMINS) Tab Take  by mouth.     • b complex vitamins tablet Take  by mouth.       No current facility-administered medications for this visit.       She  has a past medical history of Cancer (HCC); Cataract; Macular degeneration; and Tubular adenoma of colon.    ROS   Other than what is mentioned in HPI or physical exam, there is no history of headaches, double vision or blurred vision. No temporal tenderness or jaw claudication. No history of cataracts or glaucoma. No trouble swallowing difficulties or sore throats.  No chest complaints including chest pain, cough or sputum production. No GI complaints including nausea, vomiting, change in bowel habits, or past peptic ulcer disease. No history of blood in the stools. No urinary complaints including dysuria or frequency. No history of alopecia, photosensitivity, oral ulcerations, Raynaud's phenomena.       Objective:     Blood pressure 120/60, pulse 82, temperature 36.4 °C (97.5 °F), temperature source Temporal, resp. rate 14, weight 67.1 kg (148 lb), SpO2 95 %, not currently breastfeeding. Body mass index is 20.94 kg/m².   Physical Exam:    Constitutional: Alert and oriented X3, patient is talkative with good eye contact.Skin: Warm, dry, good turgor, no rashes in visible areas, no psoriatic lesions, no petechial lesions, no malar rashes  .Eye: Equal, round and reactive, conjunctiva clear, lids normal EOM intactENMT: Lips without lesions, good dentition, no oropharyngeal ulcers, moist buccal mucosa, pinna without deformityNeck: Trachea midline, no masses, no thyromegaly.Lymph:  No cervical lymphadenopathy, no axillary lymphadenopathy, no supraclavicular lymphadenopathyRespiratory: Unlabored respiratory effort, lungs clear to auscultation, no wheezes, no ronchi.Cardiovascular: Normal S1, S2, no murmur, no edema.Abdomen: Soft, non-tender, no masses, no hepatosplenomegaly.Psych: Alert and oriented x3, normal affect and mood.Neuro: Cranial nerves 2-12 are grossly intact, no loss of sensation LEExt:no joint laxity noted in bilateral arms, no joint laxity noted in bilateral legs patient has tenderness palpation  along the thumb CMC joints with left worse than right, no inflammatory deformities, no swan-neck or boutonniere deformities no carpometacarpal subluxation no flexion contractures and elbows no nodules no tophi shoulders full range of motion, knees with crepitus but no effusions, no lower extremity edema no Achilles inflammation    Lab Results   Component Value Date/Time    HEPBCORTOT Negative 07/26/2018 04:56 PM    HEPBSAG Negative 07/26/2018 04:56 PM     Lab Results   Component Value Date/Time    HEPCAB Negative 07/26/2018 04:56 PM     Lab Results   Component Value Date/Time    SODIUM 141 07/26/2018 04:56 PM    POTASSIUM 3.6 07/26/2018 04:56 PM    CHLORIDE 106 07/26/2018 04:56 PM    CO2 25 07/26/2018 04:56 PM    GLUCOSE 100 (H) 07/26/2018 04:56 PM    BUN 17 07/26/2018 04:56 PM    CREATININE 0.71 07/26/2018 04:56 PM      Lab Results   Component Value Date/Time    WBC 3.4 (L) 07/26/2018 04:56 PM    RBC 3.92 (L) 07/26/2018 04:56 PM    HEMOGLOBIN 13.4 07/26/2018 04:56 PM    HEMATOCRIT 39.2 07/26/2018 04:56 PM    .0 (H) 07/26/2018 04:56 PM    MCH 34.2 (H) 07/26/2018 04:56 PM    MCHC 34.2 07/26/2018 04:56 PM    MPV 8.9 (L) 07/26/2018 04:56 PM    NEUTSPOLYS 58.50 07/26/2018 04:56 PM    LYMPHOCYTES 25.70 07/26/2018 04:56 PM    MONOCYTES 11.40 07/26/2018 04:56 PM    EOSINOPHILS 3.50 07/26/2018 04:56 PM    BASOPHILS 0.60 07/26/2018 04:56 PM      Lab Results   Component Value Date/Time    CALCIUM 9.5 08/20/2018 03:06 PM    ASTSGOT 23 07/26/2018 04:56 PM    ALTSGPT 17 07/26/2018 04:56 PM    ALKPHOSPHAT 79 07/26/2018 04:56 PM    TBILIRUBIN 0.4 07/26/2018 04:56 PM    ALBUMIN 4.6 07/26/2018 04:56 PM    TOTPROTEIN 6.8 07/26/2018 04:56 PM     Lab Results   Component Value Date/Time    RHEUMFACTN 18 (H) 12/15/2017 01:04 PM    CCPANTIBODY 7 12/15/2017 01:04 PM    ANTINUCAB None Detected 12/15/2017 01:05 PM     Lab Results   Component Value Date/Time    SEDRATEWES 6 07/26/2018 04:56 PM     Lab Results   Component Value  Date/Time    [id number] Negative 10/11/2018 09:54 AM     Lab Results   Component Value Date/Time    G6PD 12.4 07/26/2018 04:56 PM     Lab Results   Component Value Date/Time    CPKTOTAL 123 07/26/2018 04:56 PM     Lab Results   Component Value Date/Time    PTHINTACT 65.1 08/20/2018 03:06 PM     Results for orders placed during the hospital encounter of 10/18/18   DS-BONE DENSITY STUDY (DEXA)    Impression According to the World Health Organization classification, bone mineral density of this patient is osteoporotic.        10-year Probability of Fracture:  Major Osteoporotic     12.4%  Hip     2.5%  Population      [place] ()    Based on left femur neck BMD          INTERPRETING LOCATION:  [address] [place] [address], [facility], [id number]     Results for orders placed during the hospital encounter of 08/16/18   DX-WRIST-COMPLETE 3+ RIGHT    Impression 1.  Marked deformity of the radiocarpal joint with marked fragmentation of the lunate and triquetrum with proximal migration of the distal carpal row and volar subluxation. 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 There are multiple bony fragments seen extruded dorsally and volarly. This likely represents sequelae of old trauma.    2.  Severe arthropathy of the radiocarpal, distal radioulnar and triscaphe articulations likely representing secondary osteoarthritis.    3.  Likely underlying chondrocalcinosis which can be associated with crystal-induced arthropathy.    4.  Severe osteopenia.       Assessment and Plan:     1. Primary osteoarthritis involving multiple joints  Currently not an issue, patient is trying holistic options discussed with patient tumeric 400-600 mg 3 times daily for which patient is trying, NSAIDs as needed  Patient has used corticosteroid injections in the hip which lasted for about 4 weeks in the past  - VITAMIN D,25 HYDROXY; Future    2. Senile osteoporosis  Last DEXA October 2018 indicating osteoporosis  with a T score of -2.6  Next DEXA October 2020  Discussed with patient treatment options including alendronate/Prolia, patient is not interested in starting any medications at this time wants to get her vitamin D level checked we will check a vitamin D level and discussed with patient calcium magnesium and vitamin D doses to take,  Did print out information on alendronate for patient to review patient to let us know whether she wants to start treatment or not  - VITAMIN D,25 HYDROXY; Future    3. Vitamin D deficiency  Today check vitamin D level to assure adequate supplementation  - VITAMIN D,25 HYDROXY; Future    Followup: Return in about 3 months (around 1/23/2019). or sooner ap Jack was seen 30 minutes face-to-face of which more than 50% of the time was spent counseling the patient (excluding time for procedures)  regarding  rheumatological condition and care. Therapy was discussed in detail.    Please note that this dictation was created using voice recognition software. I have made every reasonable attempt to correct obvious errors, but I expect that there are errors of grammar and possibly content that I did not discover before finalizing the note.

## 2018-10-25 ENCOUNTER — TELEPHONE (OUTPATIENT)
Dept: RHEUMATOLOGY | Facility: MEDICAL CENTER | Age: 78
End: 2018-10-25

## 2018-10-25 DIAGNOSIS — E55.9 VITAMIN D DEFICIENCY: ICD-10-CM

## 2018-10-25 NOTE — TELEPHONE ENCOUNTER
Please notify patient that her blood tests indicating mild vitamin D deficiency    Recommend taking over-the-counter vitamin D 2000 units/day and we will check her vitamin D levels again and 4-6 weeks    Vitamin D level ordered in the computer

## 2018-12-08 ENCOUNTER — HOSPITAL ENCOUNTER (OUTPATIENT)
Dept: LAB | Facility: MEDICAL CENTER | Age: 78
End: 2018-12-08
Attending: INTERNAL MEDICINE
Payer: COMMERCIAL

## 2018-12-08 DIAGNOSIS — E55.9 VITAMIN D DEFICIENCY: ICD-10-CM

## 2018-12-08 LAB — 25(OH)D3 SERPL-MCNC: 58 NG/ML (ref 30–100)

## 2018-12-08 PROCEDURE — 82306 VITAMIN D 25 HYDROXY: CPT

## 2018-12-08 PROCEDURE — 36415 COLL VENOUS BLD VENIPUNCTURE: CPT

## 2018-12-11 ENCOUNTER — OFFICE VISIT (OUTPATIENT)
Dept: RHEUMATOLOGY | Facility: MEDICAL CENTER | Age: 78
End: 2018-12-11
Payer: COMMERCIAL

## 2018-12-11 VITALS
RESPIRATION RATE: 14 BRPM | TEMPERATURE: 97.6 F | HEART RATE: 88 BPM | SYSTOLIC BLOOD PRESSURE: 130 MMHG | OXYGEN SATURATION: 94 % | DIASTOLIC BLOOD PRESSURE: 60 MMHG | BODY MASS INDEX: 21.78 KG/M2 | WEIGHT: 154 LBS

## 2018-12-11 DIAGNOSIS — R76.8 RHEUMATOID FACTOR POSITIVE: ICD-10-CM

## 2018-12-11 DIAGNOSIS — E55.9 VITAMIN D DEFICIENCY: ICD-10-CM

## 2018-12-11 DIAGNOSIS — M15.9 PRIMARY OSTEOARTHRITIS INVOLVING MULTIPLE JOINTS: ICD-10-CM

## 2018-12-11 DIAGNOSIS — M81.0 SENILE OSTEOPOROSIS: ICD-10-CM

## 2018-12-11 DIAGNOSIS — N28.9 RENAL DYSFUNCTION: ICD-10-CM

## 2018-12-11 PROCEDURE — 99213 OFFICE O/P EST LOW 20 MIN: CPT | Performed by: INTERNAL MEDICINE

## 2018-12-11 RX ORDER — MAGNESIUM GLUCONATE 27 MG(500)
500 TABLET ORAL 3 TIMES DAILY
COMMUNITY
End: 2022-03-16

## 2018-12-11 NOTE — PROGRESS NOTES
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  At last visit we had an extensive conversation regarding treatment for osteoporosis that was seen on her recent bone density scan October 2018, patient is also found to have low vitamin D currently taking vitamin D at 4000 units/day with a bump of her vitamin D from 26-58.  Patient is here stating that she does not want to pursue any medication treatments at this time for her osteoporosis wants to continue with diet and exercise.    Patient also states with her osteoarthritis she is going to take ibuprofen intermittently, patient does get intermittent corticosteroid injections by orthopedics.      Additional comorbidities include C-spine DJD/DDD.  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 with vision on the right compromised and lack of labyrinth function on the right ear     hepatitis B surface antigen 7/26/2018  Hepatitis B core antibody negative 7/26/2018  Hepatitis C antibody negative 7/26/2018  G6PD 12.4 7/26/2018  HLA B27 negative 7/26/2018  Rheumatoid Factor 18 12/15/2017; RF 23 12/2018  CCP antibody was negative at 7 /12/15/2017  DEXA 10/18/2018 T scores -2.6, -0.4  FRAX 10/18/2018 major osteoporotic fracture risk 12.4%, hip fracture risk 2.5%  Hand x-rays 12/2018-no erosive arthritis  Feet x-rays 12/2018-evidence of osteoarthritis, no evidence of erosive arthritis    Current medicines (including changes today)  Current Outpatient Prescriptions   Medication Sig Dispense Refill   • magnesium gluconate (MAG-G) 500 MG tablet Take 500 mg by mouth 3 times a day.     • Probiotic Product (SOLUBLE FIBER/PROBIOTICS PO) Take  by mouth.     • ascorbic acid (ASCORBIC ACID) 500 MG Tab Take 500 mg by  mouth every day.     • Turmeric 500 MG Tab Take  by mouth.     • calcium carbonate (RA CALCIUM) 500 MG Tab Take  by mouth.     • cyanocobalamin (VITAMIN B12) 1000 MCG Tab Take  by mouth.     • Cholecalciferol (VITAMIN D PO) Take  by mouth.     • Multiple Vitamin (MULTI-VITAMINS) Tab Take  by mouth.     • b complex vitamins tablet Take  by mouth.       No current facility-administered medications for this visit.      She  has a past medical history of Cancer (HCC); Cataract; Macular degeneration; and Tubular adenoma of colon.    ROS   Other than what is mentioned in HPI or physical exam, there is no history of headaches, double vision or blurred vision. No temporal tenderness or jaw claudication. No history of cataracts or glaucoma. No trouble swallowing difficulties or sore throats.  No chest complaints including chest pain, cough or sputum production. No GI complaints including nausea, vomiting, change in bowel habits, or past peptic ulcer disease. No history of blood in the stools. No urinary complaints including dysuria or frequency. No history of alopecia, photosensitivity, oral ulcerations, Raynaud's phenomena.       Objective:     Blood pressure 130/60, pulse 88, temperature 36.4 °C (97.6 °F), temperature source Temporal, resp. rate 14, weight 69.9 kg (154 lb), SpO2 94 %, not currently breastfeeding. Body mass index is 21.78 kg/m².   Physical Exam:    Constitutional: Alert and oriented X3, patient is talkative with good eye contact.Skin: Warm, dry, good turgor, no rashes in visible areas.Eye: Equal, round and reactive, conjunctiva clear, lids normal EOM intactENMT: Lips without lesions, good dentition, no oropharyngeal ulcers, moist buccal mucosa, pinna without deformityNeck: Trachea midline, no masses, no thyromegaly.Lymph:  No cervical lymphadenopathy, no axillary lymphadenopathy, no supraclavicular lymphadenopathyRespiratory: Unlabored respiratory effort, lungs clear to auscultation, no wheezes, no  barrera.Cardiovascular: Normal S1, S2, no murmur, no edema.Abdomen: Soft, non-tender, no masses, no hepatosplenomegaly.Psych: Alert and oriented x3, normal affect and mood.Neuro: Cranial nerves 2-12 are grossly intact, no loss of sensation LEExt:no joint laxity noted in bilateral arms, no joint laxity noted in bilateral legs, no swan-neck or boutonniere deformities no carpometacarpal subluxation no flexion contractures elbows without nodules and without tophi, knees with crepitus but no effusions no lower extremity edema no Achilles inflammation    Lab Results   Component Value Date/Time    HEPBCORTOT Negative 07/26/2018 04:56 PM    HEPBSAG Negative 07/26/2018 04:56 PM     Lab Results   Component Value Date/Time    HEPCAB Negative 07/26/2018 04:56 PM     Lab Results   Component Value Date/Time    SODIUM 141 07/26/2018 04:56 PM    POTASSIUM 3.6 07/26/2018 04:56 PM    CHLORIDE 106 07/26/2018 04:56 PM    CO2 25 07/26/2018 04:56 PM    GLUCOSE 100 (H) 07/26/2018 04:56 PM    BUN 17 07/26/2018 04:56 PM    CREATININE 0.71 07/26/2018 04:56 PM      Lab Results   Component Value Date/Time    WBC 3.4 (L) 07/26/2018 04:56 PM    RBC 3.92 (L) 07/26/2018 04:56 PM    HEMOGLOBIN 13.4 07/26/2018 04:56 PM    HEMATOCRIT 39.2 07/26/2018 04:56 PM    .0 (H) 07/26/2018 04:56 PM    MCH 34.2 (H) 07/26/2018 04:56 PM    MCHC 34.2 07/26/2018 04:56 PM    MPV 8.9 (L) 07/26/2018 04:56 PM    NEUTSPOLYS 58.50 07/26/2018 04:56 PM    LYMPHOCYTES 25.70 07/26/2018 04:56 PM    MONOCYTES 11.40 07/26/2018 04:56 PM    EOSINOPHILS 3.50 07/26/2018 04:56 PM    BASOPHILS 0.60 07/26/2018 04:56 PM      Lab Results   Component Value Date/Time    CALCIUM 9.5 08/20/2018 03:06 PM    ASTSGOT 23 07/26/2018 04:56 PM    ALTSGPT 17 07/26/2018 04:56 PM    ALKPHOSPHAT 79 07/26/2018 04:56 PM    TBILIRUBIN 0.4 07/26/2018 04:56 PM    ALBUMIN 4.6 07/26/2018 04:56 PM    TOTPROTEIN 6.8 07/26/2018 04:56 PM     Lab Results   Component Value Date/Time    RHEUMFACTN 18 (H)  12/15/2017 01:04 PM    CCPANTIBODY 7 12/15/2017 01:04 PM    ANTINUCAB None Detected 12/15/2017 01:05 PM     Lab Results   Component Value Date/Time    SEDRATEWES 6 07/26/2018 04:56 PM     Lab Results   Component Value Date/Time    [id number] Negative 10/11/2018 09:54 AM     Lab Results   Component Value Date/Time    G6PD 12.4 07/26/2018 04:56 PM     Lab Results   Component Value Date/Time    CPKTOTAL 123 07/26/2018 04:56 PM     Lab Results   Component Value Date/Time    PTHINTACT 65.1 08/20/2018 03:06 PM     Results for orders placed during the hospital encounter of 10/18/18   DS-BONE DENSITY STUDY (DEXA)    Impression According to the World Health Organization classification, bone mineral density of this patient is osteoporotic.        10-year Probability of Fracture:  Major Osteoporotic     12.4%  Hip     2.5%  Population      [place] ()    Based on left femur neck BMD          INTERPRETING LOCATION:  [address] [place] [address], [address]     Results for orders placed during the hospital encounter of 08/16/18   DX-WRIST-COMPLETE 3+ RIGHT    Impression 1.  Marked deformity of the radiocarpal joint with marked fragmentation of the lunate and triquetrum with proximal migration of the distal carpal row and volar subluxation. 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 There are multiple bony fragments seen extruded dorsally and volarly. This likely represents sequelae of old trauma.    2.  Severe arthropathy of the radiocarpal, distal radioulnar and triscaphe articulations likely representing secondary osteoarthritis.    3.  Likely underlying chondrocalcinosis which can be associated with crystal-induced arthropathy.    4.  Severe osteopenia.        Assessment and Plan:     1. Primary osteoarthritis involving multiple joints  Patient takes Motrin as needed    2. RF positive   Today recheck rheumatoid factor as this was done about a year ago, also check bilateral hand  and feet x-rays for evaluation of her erosive arthritis    3. Senile osteoporosis  Patient is osteoporosis based on her last bone density scan October 2018, patient is not interested in pursuing any kind of treatment at this time prefers to do a trial of supplements with exercise    4. Vitamin D deficiency  Bumped her vitamin D from 26-58 with 4000 units of vitamin D p.o. daily, we will lower the vitamin D down to maintenance of 2000 units/day    5. Renal dysfunction  This may impact what medications we can use to treat this patient's rheumatological disease, we will have to continue to monitor closely.    Followup: Return in about 6 months (around 6/11/2019). or sooner ap Jack was seen 30 minutes face-to-face of which more than 50% of the time was spent counseling the patient (excluding time for procedures)  regarding  rheumatological condition and care. Therapy was discussed in detail.    Please note that this dictation was created using voice recognition software. I have made every reasonable attempt to correct obvious errors, but I expect that there are errors of grammar and possibly content that I did not discover before finalizing the note.             Addendum 12/13/2018-discussed with patient the findings of fairly benign hand and feet x-rays other than osteoarthritis and a low positive rheumatoid factor with a negative CCP, agreed with our already conclusion that to monitor and no treatment at this time as patient does not feel as if she has rheumatoid arthritis does not have chronic pain and does not have signs of swelling that would go along with active rheumatoid arthritis, we will continue to monitor as stated above

## 2018-12-11 NOTE — LETTER
[facility]   [address] [place] [address], [address]  [place] [address]  Phone: [phone]  Fax: [phone]              Encounter Date: 12/11/2018    Dear Dr. Bailey ref. provider found,    It was a pleasure seeing your patient, Mirian Jack, on 12/11/2018. Diagnoses of Primary osteoarthritis involving multiple joints, Rheumatoid factor positive, Senile osteoporosis, Vitamin D deficiency, and Renal dysfunction were pertinent to this visit.     Please find attached progress note which includes the history I obtained from Ms. Jack, my physical examination findings, my impression and recommendations.      Once again, it was a pleasure participating in your patient's care.  Please feel free to contact me if you have any questions or if I can be of any further assistance to your patients.      Sincerely,    Tomasa Salazar M.D.  Electronically Signed          PROGRESS NOTE:  No notes on file

## 2018-12-12 ENCOUNTER — HOSPITAL ENCOUNTER (OUTPATIENT)
Dept: LAB | Facility: MEDICAL CENTER | Age: 78
End: 2018-12-12
Attending: INTERNAL MEDICINE
Payer: MEDICARE

## 2018-12-12 ENCOUNTER — HOSPITAL ENCOUNTER (OUTPATIENT)
Dept: RADIOLOGY | Facility: MEDICAL CENTER | Age: 78
End: 2018-12-12
Attending: INTERNAL MEDICINE
Payer: MEDICARE

## 2018-12-12 DIAGNOSIS — M81.0 SENILE OSTEOPOROSIS: ICD-10-CM

## 2018-12-12 DIAGNOSIS — M15.9 PRIMARY OSTEOARTHRITIS INVOLVING MULTIPLE JOINTS: ICD-10-CM

## 2018-12-12 DIAGNOSIS — R76.8 RHEUMATOID FACTOR POSITIVE: ICD-10-CM

## 2018-12-12 DIAGNOSIS — E55.9 VITAMIN D DEFICIENCY: ICD-10-CM

## 2018-12-12 DIAGNOSIS — N28.9 RENAL DYSFUNCTION: ICD-10-CM

## 2018-12-12 PROCEDURE — 36415 COLL VENOUS BLD VENIPUNCTURE: CPT

## 2018-12-12 PROCEDURE — 77077 JOINT SURVEY SINGLE VIEW: CPT

## 2018-12-12 PROCEDURE — 86431 RHEUMATOID FACTOR QUANT: CPT

## 2018-12-13 LAB — RHEUMATOID FACT SER IA-ACNC: 23 IU/ML (ref 0–14)

## 2019-01-18 ENCOUNTER — TELEPHONE (OUTPATIENT)
Dept: MEDICAL GROUP | Facility: LAB | Age: 79
End: 2019-01-18

## 2019-01-18 DIAGNOSIS — L98.9 SKIN LESION OF FACE: ICD-10-CM

## 2019-01-18 NOTE — TELEPHONE ENCOUNTER
Referral pending in orders for approval    Mirian LOZANO Marissa Celia De La Rosa Ma   Phone Number: [phone]             Hi . Dr. Light.  Hope you are well.      I am having a worsening of my skin lesions on my face.   I need to see a dermatologist to determine if anything more can be done. Could you give me a referral to a dermatologist?   Thank you for your help.  Mirian Jack

## 2019-01-30 ENCOUNTER — PATIENT MESSAGE (OUTPATIENT)
Dept: HEALTH INFORMATION MANAGEMENT | Facility: OTHER | Age: 79
End: 2019-01-30

## 2019-03-05 ENCOUNTER — TELEPHONE (OUTPATIENT)
Dept: MEDICAL GROUP | Facility: LAB | Age: 79
End: 2019-03-05

## 2019-03-05 DIAGNOSIS — Z23 NEED FOR VACCINATION: ICD-10-CM

## 2019-03-05 NOTE — TELEPHONE ENCOUNTER
1. Caller Name: Mirian Salinas is on the [place] Schedule tomorrow for PPSV23 vaccine/injection.    SPECIFIC Action To Be Taken: Orders pending, please sign.

## 2019-03-06 ENCOUNTER — NON-PROVIDER VISIT (OUTPATIENT)
Dept: MEDICAL GROUP | Facility: LAB | Age: 79
End: 2019-03-06
Payer: MEDICARE

## 2019-03-06 DIAGNOSIS — Z23 NEED FOR VACCINATION: ICD-10-CM

## 2019-03-06 PROCEDURE — 90732 PPSV23 VACC 2 YRS+ SUBQ/IM: CPT | Performed by: FAMILY MEDICINE

## 2019-03-06 PROCEDURE — G0009 ADMIN PNEUMOCOCCAL VACCINE: HCPCS | Performed by: FAMILY MEDICINE

## 2019-03-07 NOTE — PROGRESS NOTES
"Mirian Jack is a 78 y.o. female here for a non-provider visit for:   PNEUMOVAX (PPSV23)    Reason for immunization: Overdue/Provider Recommended  Immunization records indicate need for vaccine: Yes, confirmed with [place] [facility]  Minimum interval has been met for this vaccine: Yes  ABN completed: Not Indicated    Order and dose verified by: DAKOTA  VIS Dated  04/24/2015 was given to patient: Yes  All IAC Questionnaire questions were answered \"No.\"    Patient tolerated injection and no adverse effects were observed or reported: Yes    Pt scheduled for next dose in series: Not Indicated  "

## 2019-03-12 ENCOUNTER — HOSPITAL ENCOUNTER (OUTPATIENT)
Dept: RADIOLOGY | Facility: MEDICAL CENTER | Age: 79
End: 2019-03-12
Attending: FAMILY MEDICINE
Payer: MEDICARE

## 2019-03-12 DIAGNOSIS — Z12.31 SCREENING MAMMOGRAM, ENCOUNTER FOR: ICD-10-CM

## 2019-03-12 PROCEDURE — 77063 BREAST TOMOSYNTHESIS BI: CPT

## 2019-04-29 ENCOUNTER — PATIENT MESSAGE (OUTPATIENT)
Dept: MEDICAL GROUP | Facility: MEDICAL CENTER | Age: 79
End: 2019-04-29

## 2019-04-29 DIAGNOSIS — H66.019 ACUTE SUPPURATIVE OTITIS MEDIA WITH SPONTANEOUS RUPTURE OF EAR DRUM, RECURRENCE NOT SPECIFIED, UNSPECIFIED LATERALITY: ICD-10-CM

## 2019-10-23 ENCOUNTER — NON-PROVIDER VISIT (OUTPATIENT)
Dept: MEDICAL GROUP | Facility: LAB | Age: 79
End: 2019-10-23
Payer: COMMERCIAL

## 2019-10-23 DIAGNOSIS — Z23 NEED FOR VACCINATION: ICD-10-CM

## 2019-10-23 PROCEDURE — 90471 IMMUNIZATION ADMIN: CPT | Performed by: FAMILY MEDICINE

## 2019-10-23 PROCEDURE — 90662 IIV NO PRSV INCREASED AG IM: CPT | Performed by: FAMILY MEDICINE

## 2019-10-23 NOTE — PROGRESS NOTES
Mirian Guerrerokellen is a 78 y.o. female here for a non-provider visit for HD FLU injection.    Reason for injection: need for  Order in MAR?: Yes  Patient supplied?:No  Minimum interval has been met for this injection (per MAR order): Yes    Order and dose verified by: LG  Patient tolerated injection and no adverse effects were observed or reported: Yes    # of Administrations remaining in MAR:

## 2019-12-05 ENCOUNTER — OFFICE VISIT (OUTPATIENT)
Dept: MEDICAL GROUP | Facility: LAB | Age: 79
End: 2019-12-05
Payer: MEDICARE

## 2019-12-05 VITALS
SYSTOLIC BLOOD PRESSURE: 110 MMHG | BODY MASS INDEX: 23.25 KG/M2 | RESPIRATION RATE: 16 BRPM | OXYGEN SATURATION: 94 % | WEIGHT: 157 LBS | DIASTOLIC BLOOD PRESSURE: 58 MMHG | HEART RATE: 86 BPM | HEIGHT: 69 IN

## 2019-12-05 DIAGNOSIS — I70.0 AORTIC ATHEROSCLEROSIS (HCC): ICD-10-CM

## 2019-12-05 DIAGNOSIS — Z96.651 PRESENCE OF RIGHT ARTIFICIAL KNEE JOINT: ICD-10-CM

## 2019-12-05 DIAGNOSIS — M81.0 AGE-RELATED OSTEOPOROSIS WITHOUT CURRENT PATHOLOGICAL FRACTURE: ICD-10-CM

## 2019-12-05 DIAGNOSIS — M25.551 RIGHT HIP PAIN: ICD-10-CM

## 2019-12-05 DIAGNOSIS — R76.8 RHEUMATOID FACTOR POSITIVE: ICD-10-CM

## 2019-12-05 DIAGNOSIS — D50.9 MICROCYTIC ANEMIA: ICD-10-CM

## 2019-12-05 DIAGNOSIS — Z85.828 HISTORY OF BASAL CELL CARCINOMA: ICD-10-CM

## 2019-12-05 DIAGNOSIS — E78.5 DYSLIPIDEMIA: ICD-10-CM

## 2019-12-05 DIAGNOSIS — Z78.0 POSTMENOPAUSAL: ICD-10-CM

## 2019-12-05 DIAGNOSIS — Z00.00 MEDICARE ANNUAL WELLNESS VISIT, SUBSEQUENT: ICD-10-CM

## 2019-12-05 DIAGNOSIS — G89.29 CHRONIC PAIN OF RIGHT KNEE: ICD-10-CM

## 2019-12-05 DIAGNOSIS — Z86.010 HISTORY OF COLONIC POLYPS: ICD-10-CM

## 2019-12-05 DIAGNOSIS — M15.9 PRIMARY OSTEOARTHRITIS INVOLVING MULTIPLE JOINTS: ICD-10-CM

## 2019-12-05 DIAGNOSIS — N28.9 RENAL DYSFUNCTION: ICD-10-CM

## 2019-12-05 DIAGNOSIS — M25.561 CHRONIC PAIN OF RIGHT KNEE: ICD-10-CM

## 2019-12-05 DIAGNOSIS — Z66 DO NOT RESUSCITATE STATUS: ICD-10-CM

## 2019-12-05 PROBLEM — N63.20 BREAST MASS, LEFT: Status: RESOLVED | Noted: 2017-12-12 | Resolved: 2019-12-05

## 2019-12-05 PROCEDURE — G0439 PPPS, SUBSEQ VISIT: HCPCS | Performed by: FAMILY MEDICINE

## 2019-12-05 PROCEDURE — 99213 OFFICE O/P EST LOW 20 MIN: CPT | Mod: 25 | Performed by: FAMILY MEDICINE

## 2019-12-05 ASSESSMENT — PATIENT HEALTH QUESTIONNAIRE - PHQ9: CLINICAL INTERPRETATION OF PHQ2 SCORE: 0

## 2019-12-05 ASSESSMENT — ACTIVITIES OF DAILY LIVING (ADL): BATHING_REQUIRES_ASSISTANCE: 0

## 2019-12-05 ASSESSMENT — ENCOUNTER SYMPTOMS: GENERAL WELL-BEING: EXCELLENT

## 2019-12-05 NOTE — PROGRESS NOTES
Chief Complaint   Patient presents with   • Annual Exam     Medicare Wellness   • Referral Needed     orthopedics , and dexa scan         HPI:  Mirian is a 79 y.o. here for Medicare Annual Wellness Visit    Patient is having increased right hip pain.  August 2018 she had a steroid injection that really helped.  This was done by interventional radiology and she would like to have it done again.  She is also having knee pain where she had the knee replacement and would like a referral to [facility].  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  The pain is causing her to limp.    Patient Active Problem List    Diagnosis Date Noted   • Right hip pain 12/05/2019   • Chronic pain of right knee 12/05/2019   • Rheumatoid factor positive 12/05/2019   • Age-related osteoporosis without current pathological fracture 12/05/2019   • Do not resuscitate status 12/05/2019   • Dyslipidemia 12/12/2017   • Microcytic anemia 12/12/2017   • Renal dysfunction 12/12/2017   • Primary osteoarthritis 02/16/2017   • History of tubular adenoma of the colon    • Aortic atherosclerosis (HCC) 10/12/2016   • History of basal cell carcinoma 11/11/2015   • Hx of LASIK 04/25/2011   • Presence of right artificial knee joint 07/22/2009       Current Outpatient Medications   Medication Sig Dispense Refill   • magnesium gluconate (MAG-G) 500 MG tablet Take 500 mg by mouth 3 times a day.     • Probiotic Product (SOLUBLE FIBER/PROBIOTICS PO) Take  by mouth.     • ascorbic acid (ASCORBIC ACID) 500 MG Tab Take 500 mg by mouth every day.     • Turmeric 500 MG Tab Take  by mouth.     • calcium carbonate (RA CALCIUM) 500 MG Tab Take  by mouth.     • cyanocobalamin (VITAMIN B12) 1000 MCG Tab Take  by mouth.     • Cholecalciferol (VITAMIN D PO) Take  by mouth.     • Multiple Vitamin (MULTI-VITAMINS) Tab Take  by mouth.     • b complex vitamins tablet Take  by mouth.       No current facility-administered  medications for this visit.         The patient is not taking any medication(s)   Current supplements as per medication list.     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  She and her  are very active in multiple activities.    Is patient current with immunizations? No, due for SHINGRIX (Shingles). Patient is interested in receiving NONE today.    She  reports that she has never smoked. She has never used smokeless tobacco. She reports current alcohol use of about 6.0 oz of alcohol per week.  Counseling given: Not Answered        DPA/Advanced directive: Patient does not have an Advanced Directive.  A packet and workshop information was given on Advanced Directives.    ROS:    Gait: Uses no assistive device   Ostomy: No   Other tubes: No   Amputations: No   Chronic oxygen use No   Last eye exam 2018   Wears hearing aids: No   : Denies any urinary leakage during the last 6 months        Depression Screening    Little interest or pleasure in doing things?  0 - not at all  Feeling down, depressed, or hopeless? 0 - not at all  Patient Health Questionnaire Score: 0    If depressive symptoms identified deferred to follow up visit unless specifically addressed in assessment and plan.    Interpretation of PHQ-9 Total Score   Score Severity   1-4 No Depression   5-9 Mild Depression   10-14 Moderate Depression   15-19 Moderately Severe Depression   20-27 Severe Depression    Screening for Cognitive Impairment    Three Minute Recall (village, kitchen, baby)  3/3    Jeovany clock face with all 12 numbers and set the hands to show 10 past 10.   Yes    If cognitive concerns identified, deferred for follow up unless specifically addressed in assessment and plan.    Fall Risk Assessment    Has the patient had two or more falls in the last year or any fall with injury in the last year?     If fall risk identified, deferred for  follow up unless specifically addressed in assessment and plan.    Safety Assessment    Throw rugs on floor.  No  Handrails on all stairs.  Yes  Good lighting in all hallways.  Yes  Difficulty hearing.  No  Patient counseled about all safety risks that were identified.    Functional Assessment ADLs    Are there any barriers preventing you from cooking for yourself or meeting nutritional needs?  No.    Are there any barriers preventing you from driving safely or obtaining transportation?  No.    Are there any barriers preventing you from using a telephone or calling for help?  No.    Are there any barriers preventing you from shopping?  No.    Are there any barriers preventing you from taking care of your own finances?  No.    Are there any barriers preventing you from managing your medications?  No.    Are there any barriers preventing you from showering, bathing or dressing yourself?  No.    Are you currently engaging in any exercise or physical activity?  Yes.  Attends gym everyday  What is your perception of your health?  Excellent.    Health Maintenance Summary                IMM ZOSTER VACCINES Postponed 5/5/2020 Originally 11/22/2012. System: vaccine not available, other system reasons     Done 9/27/2012 Imm Admin: Zoster Vaccine Live (ZVL) (Zostavax)    MAMMOGRAM Next Due 3/12/2020      Done 3/12/2019 MA-SCREENING MAMMO BILAT W/TOMOSYNTHESIS W/CAD     Patient has more history with this topic...    IMM DTaP/Tdap/Td Vaccine Next Due 9/27/2022      Done 9/27/2012 Imm Admin: Tdap Vaccine     Patient has more history with this topic...    BONE DENSITY Next Due 10/18/2023      Done 10/18/2018 DS-BONE DENSITY STUDY (DEXA)     Patient has more history with this topic...    COLONOSCOPY Next Due 6/15/2026      Done 6/15/2016           Patient Care Team:  Mari Light M.D. as PCP - General (Family Medicine)  Liza Tenorio M.D. (Inactive) as Consulting Physician (Rheumatology)    Social History     Tobacco Use   • Smoking  "status: Never Smoker   • Smokeless tobacco: Never Used   Substance Use Topics   • Alcohol use: Yes     Alcohol/week: 6.0 oz     Types: 10 Glasses of wine per week   • Drug use: Not on file     Family History   Problem Relation Age of Onset   • Heart Disease Mother    • Stroke Father      She  has a past medical history of Cancer (HCC), Cataract, Macular degeneration, and Tubular adenoma of colon.   Past Surgical History:   Procedure Laterality Date   • LAMINOTOMY  2000    L4-5   • EYE SURGERY      lasik and cataracts   • FOOT SURGERY     • KNEE ARTHROPLASTY TOTAL Right            Exam:     /58 (BP Location: Right arm)   Pulse 86   Resp 16   Ht 1.753 m (5' 9\")   Wt 71.2 kg (157 lb)   SpO2 94%  Body mass index is 23.18 kg/m².    Hearing good.    Dentition good  Alert, oriented in no acute distress.  Eye contact is good, speech goal directed, affect calm  Constitutional: Alert, no distress.  Skin: Warm, dry, good turgor, no rashes in visible areas.  Eye: Equal, round and reactive, conjunctiva clear, lids normal.  ENMT: Lips without lesions, good dentition, oropharynx clear.  Neck: Trachea midline, no masses, no thyromegaly. No cervical or supraclavicular lymphadenopathy  Respiratory: Unlabored respiratory effort, lungs clear to auscultation, no wheezes, no ronchi.  Cardiovascular: Normal S1, S2, RRR, no murmur, no edema.  Abdomen: Soft, non-tender, no masses, no hepatosplenomegaly.  Psych: Alert and oriented x3, normal affect and mood.        Assessment and Plan. The following treatment and monitoring plan is recommended:    1. Medicare annual wellness visit, subsequent  Routine anticipatory guidance.  No special services needed at this time    2. Primary osteoarthritis involving multiple joints  Refer to orthopedics for further evaluation and treatment  - REFERRAL TO ORTHOPEDICS    3. History of tubular adenoma of the colon  This is a chronic medical condition that is currently stable    4. Aortic " atherosclerosis (HCC)  This is a chronic medical condition that is currently stable  Again recommend statin use for primary medical treatment.  Patient refuses.    5. Dyslipidemia  Recheck labs  Again recommend statin use for primary medical treatment.  Patient refuses.    - Lipid Profile; Future  - TSH; Future  - FREE THYROXINE; Future    6. History of basal cell carcinoma  Continue follow-up with dermatology.  Sun protection discussed    7. Microcytic anemia  Recheck CBC  - CBC WITH DIFFERENTIAL; Future    8. Renal dysfunction  Avoid high-dose NSAIDs.  Recheck chemistry panel  - Comp Metabolic Panel; Future    9. Right hip pain  Refer to orthopedics.  Refer to interventional radiology for steroid injection  - REFERRAL TO ORTHOPEDICS  - DX-INJECT OR ASPIRATE W/O US GUIDANCE-LARGE JOINT RIGHT; Future    10. Chronic pain of right knee  Refer to orthopedics for further evaluation and treatment  - REFERRAL TO ORTHOPEDICS    11. Presence of right artificial knee joint  Refer to orthopedics for further evaluation and treatment    12. Rheumatoid factor positive  Patient is following with rheumatology    13. Age-related osteoporosis without current pathological fracture  Increase weightbearing exercise, calcium and vitamin D supplementation.  Patient does not want medication for this.  Recheck bone density  - DS-BONE DENSITY STUDY (DEXA); Future    14. Postmenopausal  Increase weightbearing exercise, calcium and vitamin D supplementation.  Patient does not want medication for this.  Recheck bone density  - DS-BONE DENSITY STUDY (DEXA); Future    15. Do not resuscitate status  Patient is capable of making decisions and does not want any life-saving measures.  Paperwork filled out    Services suggested: No services needed at this time  Health Care Screening recommendations as per orders if indicated.  Referrals offered: PT/OT/Nutrition counseling/Behavioral Health/Smoking cessation as per orders if indicated.    Discussion  today about general wellness and lifestyle habits:    · Prevent falls and reduce trip hazards; Cautioned about securing or removing rugs.  · Have a working fire alarm and carbon monoxide detector;   · Engage in regular physical activity and social activities       Follow-up: Return in about 1 year (around 12/5/2020).

## 2019-12-05 NOTE — PATIENT INSTRUCTIONS
Mediterranean Diet  A Mediterranean diet refers to food and lifestyle choices that are based on the traditions of countries located on the [place]. This way of eating has been shown to help prevent certain conditions and improve outcomes for people who have chronic diseases, like kidney disease and heart disease.  What are tips for following this plan?  Lifestyle  · Cook and eat meals together with your family, when possible.  · Drink enough fluid to keep your urine clear or pale yellow.  · Be physically active every day. This includes:  ¨ Aerobic exercise like running or swimming.  ¨ Leisure activities like gardening, walking, or housework.  · Get 7-8 hours of sleep each night.  · If recommended by your health care provider, drink red wine in moderation. This means 1 glass a day for nonpregnant women and 2 glasses a day for men. A glass of wine equals 5 oz (150 mL).  Reading food labels  · Check the serving size of packaged foods. For foods such as rice and pasta, the serving size refers to the amount of cooked product, not dry.  · Check the total fat in packaged foods. Avoid foods that have saturated fat or trans fats.  · Check the ingredients list for added sugars, such as corn syrup.  Shopping  · At the grocery store, buy most of your food from the areas near the walls of the store. This includes:  ¨ Fresh fruits and vegetables (produce).  ¨ Grains, beans, nuts, and seeds. Some of these may be available in unpackaged forms or large amounts (in bulk).  ¨ Fresh seafood.  ¨ Poultry and eggs.  ¨ Low-fat dairy products.  · Buy whole ingredients instead of prepackaged foods.  · Buy fresh fruits and vegetables in-season from local farmers markets.  · Buy frozen fruits and vegetables in resealable bags.  · If you do not have access to quality fresh seafood, buy precooked frozen shrimp or canned fish, such as tuna, salmon, or sardines.  · Buy small amounts of raw or cooked vegetables, salads, or olives from the  deli or salad bar at your store.  · Stock your pantry so you always have certain foods on hand, such as olive oil, canned tuna, canned tomatoes, rice, pasta, and beans.  Cooking  · Cook foods with extra-virgin olive oil instead of using butter or other vegetable oils.  · Have meat as a side dish, and have vegetables or grains as your main dish. This means having meat in small portions or adding small amounts of meat to foods like pasta or stew.  · Use beans or vegetables instead of meat in common dishes like chili or lasagna.  · [place] with different cooking methods. Try roasting or broiling vegetables instead of steaming or sautéeing them.  · Add frozen vegetables to soups, stews, pasta, or rice.  · Add nuts or seeds for added healthy fat at each meal. You can add these to yogurt, salads, or vegetable dishes.  · Marinate fish or vegetables using olive oil, lemon juice, garlic, and fresh herbs.  Meal planning  · Plan to eat 1 vegetarian meal one day each week. Try to work up to 2 vegetarian meals, if possible.  · Eat seafood 2 or more times a week.  · Have healthy snacks readily available, such as:  ¨ Vegetable sticks with hummus.  ¨ Greek yogurt.  ¨ Fruit and nut trail mix.  · Eat balanced meals throughout the week. This includes:  ¨ Fruit: 2-3 servings a day  ¨ Vegetables: 4-5 servings a day  ¨ Low-fat dairy: 2 servings a day  ¨ Fish, poultry, or lean meat: 1 serving a day  ¨ Beans and legumes: 2 or more servings a week  ¨ Nuts and seeds: 1-2 servings a day  ¨ Whole grains: 6-8 servings a day  ¨ Extra-virgin olive oil: 3-4 servings a day  · Limit red meat and sweets to only a few servings a month  What are my food choices?  · Mediterranean diet  ¨ Recommended  ¨ Grains: Whole-grain pasta. Brown rice. Bulgar wheat. Polenta. Couscous. Whole-wheat bread. Oatmeal. Quinoa.  ¨ Vegetables: Artichokes. Beets. Broccoli. Cabbage. Carrots. Eggplant. Green beans. Chard. Kale. Spinach. Onions. Leeks. Peas. Squash.  Tomatoes. Peppers. Radishes.  ¨ Fruits: Apples. Apricots. Avocado. Berries. Bananas. Cherries. Dates. Figs. Grapes. [place]. Melon. Oranges. Peaches. Plums. Pomegranate.  ¨ Meats and other protein foods: Beans. Almonds. Sunflower seeds. Pine nuts. Peanuts. Cod. [place]. Scallops. Shrimp. Tuna. Tilapia. Clams. Oysters. Eggs.  ¨ Dairy: Low-fat milk. Cheese. Greek yogurt.  ¨ Beverages: Water. Red wine. Herbal tea.  ¨ Fats and oils: Extra virgin olive oil. Avocado oil. Grape seed oil.  ¨ Sweets and desserts: Greek yogurt with honey. Baked apples. Poached pears. Trail mix.  ¨ Seasoning and other foods: Basil. Cilantro. Coriander. Cumin. Mint. Parsley. [place]. Rosemary. Tarragon. Garlic. Oregano. Thyme. Pepper. Balsalmic vinegar. Tahini. Hummus. Tomato sauce. Olives. Mushrooms.  ¨ Limit these  ¨ Grains: Prepackaged pasta or rice dishes. Prepackaged cereal with added sugar.  ¨ Vegetables: Deep fried potatoes (french fries).  ¨ Fruits: Fruit canned in syrup.  ¨ Meats and other protein foods: Beef. Pork. Lamb. Poultry with skin. Hot dogs. [place].  ¨ Dairy: Ice cream. Sour cream. Whole milk.  ¨ Beverages: Juice. Sugar-sweetened soft drinks. Beer. Liquor and spirits.  ¨ Fats and oils: Butter. Canola oil. Vegetable oil. Beef fat (tallow). Lard.  ¨ Sweets and desserts: Cookies. Cakes. Pies. Candy.  ¨ Seasoning and other foods: Mayonnaise. Premade sauces and marinades.  ¨ The items listed may not be a complete list. Talk with your dietitian about what dietary choices are right for you.  Summary  · The Mediterranean diet includes both food and lifestyle choices.  · Eat a variety of fresh fruits and vegetables, beans, nuts, seeds, and whole grains.  · Limit the amount of red meat and sweets that you eat.  · Talk with your health care provider about whether it is safe for you to drink red wine in moderation. This means 1 glass a day for nonpregnant women and 2 glasses a day for men. A glass of wine equals 5 oz (150 mL).  This information  is not intended to replace advice given to you by your health care provider. Make sure you discuss any questions you have with your health care provider.  Document Released: 08/10/2017 Document Revised: 09/12/2017 Document Reviewed: 08/10/2017  Elsevier Interactive Patient Education © 2017 Elsevier Inc.

## 2019-12-06 ENCOUNTER — HOSPITAL ENCOUNTER (OUTPATIENT)
Dept: LAB | Facility: MEDICAL CENTER | Age: 79
End: 2019-12-06
Attending: FAMILY MEDICINE
Payer: MEDICARE

## 2019-12-06 DIAGNOSIS — N28.9 RENAL DYSFUNCTION: ICD-10-CM

## 2019-12-06 DIAGNOSIS — D50.9 MICROCYTIC ANEMIA: ICD-10-CM

## 2019-12-06 DIAGNOSIS — E78.5 DYSLIPIDEMIA: ICD-10-CM

## 2019-12-06 LAB
ALBUMIN SERPL BCP-MCNC: 4.4 G/DL (ref 3.2–4.9)
ALBUMIN/GLOB SERPL: 1.9 G/DL
ALP SERPL-CCNC: 79 U/L (ref 30–99)
ALT SERPL-CCNC: 12 U/L (ref 2–50)
ANION GAP SERPL CALC-SCNC: 12 MMOL/L (ref 0–11.9)
AST SERPL-CCNC: 17 U/L (ref 12–45)
BASOPHILS # BLD AUTO: 1.1 % (ref 0–1.8)
BASOPHILS # BLD: 0.06 K/UL (ref 0–0.12)
BILIRUB SERPL-MCNC: 0.7 MG/DL (ref 0.1–1.5)
BUN SERPL-MCNC: 19 MG/DL (ref 8–22)
CALCIUM SERPL-MCNC: 9.1 MG/DL (ref 8.5–10.5)
CHLORIDE SERPL-SCNC: 104 MMOL/L (ref 96–112)
CHOLEST SERPL-MCNC: 261 MG/DL (ref 100–199)
CO2 SERPL-SCNC: 27 MMOL/L (ref 20–33)
CREAT SERPL-MCNC: 0.78 MG/DL (ref 0.5–1.4)
EOSINOPHIL # BLD AUTO: 0.09 K/UL (ref 0–0.51)
EOSINOPHIL NFR BLD: 1.6 % (ref 0–6.9)
ERYTHROCYTE [DISTWIDTH] IN BLOOD BY AUTOMATED COUNT: 48.8 FL (ref 35.9–50)
FASTING STATUS PATIENT QL REPORTED: NORMAL
GLOBULIN SER CALC-MCNC: 2.3 G/DL (ref 1.9–3.5)
GLUCOSE SERPL-MCNC: 101 MG/DL (ref 65–99)
HCT VFR BLD AUTO: 40.6 % (ref 37–47)
HDLC SERPL-MCNC: 77 MG/DL
HGB BLD-MCNC: 13.5 G/DL (ref 12–16)
IMM GRANULOCYTES # BLD AUTO: 0.01 K/UL (ref 0–0.11)
IMM GRANULOCYTES NFR BLD AUTO: 0.2 % (ref 0–0.9)
LDLC SERPL CALC-MCNC: 164 MG/DL
LYMPHOCYTES # BLD AUTO: 1.07 K/UL (ref 1–4.8)
LYMPHOCYTES NFR BLD: 19 % (ref 22–41)
MCH RBC QN AUTO: 34.6 PG (ref 27–33)
MCHC RBC AUTO-ENTMCNC: 33.3 G/DL (ref 33.6–35)
MCV RBC AUTO: 104.1 FL (ref 81.4–97.8)
MONOCYTES # BLD AUTO: 0.49 K/UL (ref 0–0.85)
MONOCYTES NFR BLD AUTO: 8.7 % (ref 0–13.4)
NEUTROPHILS # BLD AUTO: 3.9 K/UL (ref 2–7.15)
NEUTROPHILS NFR BLD: 69.4 % (ref 44–72)
NRBC # BLD AUTO: 0 K/UL
NRBC BLD-RTO: 0 /100 WBC
PLATELET # BLD AUTO: 235 K/UL (ref 164–446)
PMV BLD AUTO: 9.4 FL (ref 9–12.9)
POTASSIUM SERPL-SCNC: 4.2 MMOL/L (ref 3.6–5.5)
PROT SERPL-MCNC: 6.7 G/DL (ref 6–8.2)
RBC # BLD AUTO: 3.9 M/UL (ref 4.2–5.4)
SODIUM SERPL-SCNC: 143 MMOL/L (ref 135–145)
T4 FREE SERPL-MCNC: 0.88 NG/DL (ref 0.53–1.43)
TRIGL SERPL-MCNC: 98 MG/DL (ref 0–149)
TSH SERPL DL<=0.005 MIU/L-ACNC: 2.59 UIU/ML (ref 0.38–5.33)
WBC # BLD AUTO: 5.6 K/UL (ref 4.8–10.8)

## 2019-12-06 PROCEDURE — 80053 COMPREHEN METABOLIC PANEL: CPT

## 2019-12-06 PROCEDURE — 36415 COLL VENOUS BLD VENIPUNCTURE: CPT

## 2019-12-06 PROCEDURE — 80061 LIPID PANEL: CPT

## 2019-12-06 PROCEDURE — 84439 ASSAY OF FREE THYROXINE: CPT

## 2019-12-06 PROCEDURE — 84443 ASSAY THYROID STIM HORMONE: CPT

## 2019-12-06 PROCEDURE — 85025 COMPLETE CBC W/AUTO DIFF WBC: CPT

## 2019-12-09 ENCOUNTER — TELEPHONE (OUTPATIENT)
Dept: MEDICAL GROUP | Facility: LAB | Age: 79
End: 2019-12-09

## 2019-12-13 ENCOUNTER — HOSPITAL ENCOUNTER (OUTPATIENT)
Dept: RADIOLOGY | Facility: MEDICAL CENTER | Age: 79
End: 2019-12-13
Attending: FAMILY MEDICINE
Payer: MEDICARE

## 2019-12-13 DIAGNOSIS — M81.0 AGE-RELATED OSTEOPOROSIS WITHOUT CURRENT PATHOLOGICAL FRACTURE: ICD-10-CM

## 2019-12-13 DIAGNOSIS — Z78.0 POSTMENOPAUSAL: ICD-10-CM

## 2019-12-13 PROCEDURE — 77080 DXA BONE DENSITY AXIAL: CPT

## 2019-12-16 ENCOUNTER — TELEPHONE (OUTPATIENT)
Dept: MEDICAL GROUP | Facility: LAB | Age: 79
End: 2019-12-16

## 2019-12-16 DIAGNOSIS — D75.89 MACROCYTOSIS WITHOUT ANEMIA: ICD-10-CM

## 2019-12-16 DIAGNOSIS — M16.11 PRIMARY OSTEOARTHRITIS OF RIGHT HIP: ICD-10-CM

## 2019-12-16 NOTE — TELEPHONE ENCOUNTER
----- Message from Ladonna Miller sent at 12/15/2019  4:01 AM PST -----  Regarding: FW: Referral Request  Contact: [phone]    ----- Message -----  From: Mirian Jack  Sent: 12/14/2019   9:05 AM PST  To: Amb All Mas  Subject: Referral Request                                 Mirian Jack would like to request a referral.  Reason: For evaluation for a hip replacement right  Requested provider: Referral to Lm Leonard mD at  [facility]  Comment:  I have severe right hip pain with degeneration and an anterior hip replacement was recomnded by Dr Danny Marquez from [facility]. Please  have Mari Light make this referral. My appt with Dr Leonard is Dec 18th. Mirian

## 2020-01-08 ENCOUNTER — PATIENT MESSAGE (OUTPATIENT)
Dept: MEDICAL GROUP | Facility: LAB | Age: 80
End: 2020-01-08

## 2020-01-23 ENCOUNTER — OFFICE VISIT (OUTPATIENT)
Dept: MEDICAL GROUP | Facility: LAB | Age: 80
End: 2020-01-23
Payer: COMMERCIAL

## 2020-01-23 ENCOUNTER — HOSPITAL ENCOUNTER (OUTPATIENT)
Dept: LAB | Facility: MEDICAL CENTER | Age: 80
End: 2020-01-23
Attending: FAMILY MEDICINE
Payer: MEDICARE

## 2020-01-23 VITALS
DIASTOLIC BLOOD PRESSURE: 70 MMHG | HEIGHT: 70 IN | OXYGEN SATURATION: 94 % | TEMPERATURE: 98.2 F | BODY MASS INDEX: 22.05 KG/M2 | WEIGHT: 154 LBS | SYSTOLIC BLOOD PRESSURE: 110 MMHG | HEART RATE: 88 BPM | RESPIRATION RATE: 16 BRPM

## 2020-01-23 DIAGNOSIS — R73.9 HYPERGLYCEMIA: ICD-10-CM

## 2020-01-23 DIAGNOSIS — D75.89 MACROCYTOSIS WITHOUT ANEMIA: ICD-10-CM

## 2020-01-23 DIAGNOSIS — M81.0 AGE-RELATED OSTEOPOROSIS WITHOUT CURRENT PATHOLOGICAL FRACTURE: ICD-10-CM

## 2020-01-23 DIAGNOSIS — Z91.89 OTHER SPECIFIED PERSONAL RISK FACTORS, NOT ELSEWHERE CLASSIFIED: ICD-10-CM

## 2020-01-23 DIAGNOSIS — E78.5 DYSLIPIDEMIA: ICD-10-CM

## 2020-01-23 PROBLEM — Z96.641 STATUS POST RIGHT HIP REPLACEMENT: Status: ACTIVE | Noted: 2019-12-05

## 2020-01-23 LAB
FOLATE SERPL-MCNC: >24 NG/ML
VIT B12 SERPL-MCNC: 236 PG/ML (ref 211–911)

## 2020-01-23 PROCEDURE — 99214 OFFICE O/P EST MOD 30 MIN: CPT | Performed by: FAMILY MEDICINE

## 2020-01-23 PROCEDURE — 82607 VITAMIN B-12: CPT

## 2020-01-23 PROCEDURE — 36415 COLL VENOUS BLD VENIPUNCTURE: CPT | Mod: GA

## 2020-01-23 PROCEDURE — 83036 HEMOGLOBIN GLYCOSYLATED A1C: CPT | Mod: GA

## 2020-01-23 PROCEDURE — 82746 ASSAY OF FOLIC ACID SERUM: CPT

## 2020-01-23 NOTE — ASSESSMENT & PLAN NOTE
When compared with the most recent study dated 10/18/2018, there has been a 0.6% decrease in the bone mineral density of the left forearm and a 3.9% decrease in the bone mineral density of the left femur.    According to the World Health Organization classification, bone mineral density of this patient is osteoporotic in the left forearm and osteopenic in the left proximal femur.     10-year Probability of Fracture:  Major Osteoporotic     11.2%  Hip     2.5%

## 2020-01-23 NOTE — ASSESSMENT & PLAN NOTE
The 10-year ASCVD risk score ([place] LEVINE Jr., et al., 2013) is: 17.3%     Results for LYNDON COTTO (MRN [id number]) as of 1/23/2020 08:55   Ref. Range 12/6/2019 08:59   Cholesterol,Tot Latest Ref Range: 100 - 199 mg/dL 261 (H)   Triglycerides Latest Ref Range: 0 - 149 mg/dL 98   HDL Latest Ref Range: >=40 mg/dL 77   LDL Latest Ref Range: <100 mg/dL 164 (H)

## 2020-01-24 LAB
EST. AVERAGE GLUCOSE BLD GHB EST-MCNC: 111 MG/DL
HBA1C MFR BLD: 5.5 % (ref 0–5.6)

## 2020-01-28 NOTE — PROGRESS NOTES
Subjective:     Chief Complaint   Patient presents with   • Lab Results       Mirian Jack is a 79 y.o. female here today for evaluation and management of:    Dyslipidemia  The 10-year ASCVD risk score ([place] ABNER Jr., et al., 2013) is: 17.3%     Results for MIRIAN JACK (MRN [id number]) as of 1/23/2020 08:55   Ref. Range 12/6/2019 08:59   Cholesterol,Tot Latest Ref Range: 100 - 199 mg/dL 261 (H)   Triglycerides Latest Ref Range: 0 - 149 mg/dL 98   HDL Latest Ref Range: >=40 mg/dL 77   LDL Latest Ref Range: <100 mg/dL 164 (H)       Age-related osteoporosis without current pathological fracture  When compared with the most recent study dated 10/18/2018, there has been a 0.6% decrease in the bone mineral density of the left forearm and a 3.9% decrease in the bone mineral density of the left femur.    According to the World Health Organization classification, bone mineral density of this patient is osteoporotic in the left forearm and osteopenic in the left proximal femur.     10-year Probability of Fracture:  Major Osteoporotic     11.2%  Hip     2.5%         Allergies   Allergen Reactions   • Aspirin Unspecified     GI upset and sweating if she takes Aspirin or Advil on a regular basis.   • Ibuprofen Unspecified     GI upset and sweating if she takes Advil or Aspirin on a regular basis.       Current medicines (including changes today)  Current Outpatient Medications   Medication Sig Dispense Refill   • magnesium gluconate (MAG-G) 500 MG tablet Take 500 mg by mouth 3 times a day.     • Probiotic Product (SOLUBLE FIBER/PROBIOTICS PO) Take  by mouth.     • ascorbic acid (ASCORBIC ACID) 500 MG Tab Take 500 mg by mouth every day.     • Turmeric 500 MG Tab Take  by mouth.     • calcium carbonate (RA CALCIUM) 500 MG Tab Take  by mouth.     • cyanocobalamin (VITAMIN B12) 1000 MCG Tab Take  by mouth.     • Cholecalciferol (VITAMIN D PO) Take  by mouth.     • Multiple Vitamin (MULTI-VITAMINS) Tab Take  by mouth.     • b  "complex vitamins tablet Take  by mouth.       No current facility-administered medications for this visit.        She  has a past medical history of Cancer (HCC), Cataract, Macular degeneration, and Tubular adenoma of colon.    Patient Active Problem List    Diagnosis Date Noted   • Macrocytosis without anemia 01/23/2020   • Hyperglycemia 01/23/2020   • Status post right hip replacement 12/05/2019   • Chronic pain of right knee 12/05/2019   • Rheumatoid factor positive 12/05/2019   • Age-related osteoporosis without current pathological fracture 12/05/2019   • Do not resuscitate status 12/05/2019   • Dyslipidemia 12/12/2017   • Microcytic anemia 12/12/2017   • Renal dysfunction 12/12/2017   • Primary osteoarthritis 02/16/2017   • History of tubular adenoma of the colon    • Aortic atherosclerosis (HCC) 10/12/2016   • History of basal cell carcinoma 11/11/2015   • Hx of LASIK 04/25/2011   • Presence of right artificial knee joint 07/22/2009       ROS   No fever or chills.  No nausea or vomiting.  No chest pain or palpitations.  No cough or SOB.  No pain with urination or hematuria.  No black or bloody stools.       Objective:     /70 (BP Location: Right arm, Patient Position: Sitting, BP Cuff Size: Adult)   Pulse 88   Temp 36.8 °C (98.2 °F) (Temporal)   Resp 16   Ht 1.778 m (5' 10\")   Wt 69.9 kg (154 lb)   SpO2 94%  Body mass index is 22.1 kg/m².   Physical Exam:  Well developed, well nourished.  Alert, oriented in no acute distress.  Eye contact is good, speech goal directed, affect calm  Eyes: conjunctiva non-injected, sclera non-icteric.  Neck Supple.  No adenopathy or masses in the neck or supraclavicular regions. No thyromegaly  Lungs: clear to auscultation bilaterally with good excursion. No wheezes or rhonchi  CV: regular rate and rhythm. No murmur        Assessment and Plan:   The following treatment plan was discussed    1. Dyslipidemia  Given patient's dyslipidemia and  The 10-year ASCVD risk " score ([place] LEVINE Jr., et al., 2013) is: 17.3% we will get a cardiac calcium score to turn determine whether to start statins.  Patient is very resistant to starting statins because of perceived muscle weakness.    - CT-CARDIAC SCORING; Future    2. Macrocytosis without anemia  Check B12 and folate.  Discussed limiting alcohol intake  - VITAMIN B12; Future  - FOLATE; Future    3. Age-related osteoporosis without current pathological fracture  Patient does not want to take medications.  She would like to try weightbearing exercise, calcium and vitamin D supplementation    4. Other specified personal risk factors, not elsewhere classified  The 10-year ASCVD risk score ([place] LEVINE Jr., et al., 2013) is: 17.3%    - CT-CARDIAC SCORING; Future    5. Hyperglycemia  Check hemoglobin A1c.  Mediterranean diet information given  - HEMOGLOBIN A1C; Future    Any change or worsening of signs or symptoms, patient encouraged to follow-up or report to the emergency room for further evaluation. Patient understands and agrees.    Followup: Return in about 6 months (around 7/23/2020).

## 2020-02-04 ENCOUNTER — HOSPITAL ENCOUNTER (OUTPATIENT)
Dept: RADIOLOGY | Facility: MEDICAL CENTER | Age: 80
End: 2020-02-04
Attending: FAMILY MEDICINE
Payer: MEDICARE

## 2020-02-04 DIAGNOSIS — E78.5 DYSLIPIDEMIA: ICD-10-CM

## 2020-02-04 DIAGNOSIS — Z91.89 OTHER SPECIFIED PERSONAL RISK FACTORS, NOT ELSEWHERE CLASSIFIED: ICD-10-CM

## 2020-02-04 PROCEDURE — 4410556 CT-CARDIAC SCORING

## 2020-02-06 ENCOUNTER — PATIENT MESSAGE (OUTPATIENT)
Dept: MEDICAL GROUP | Facility: LAB | Age: 80
End: 2020-02-06

## 2020-02-10 RX ORDER — AMOXICILLIN 500 MG/1
2000 CAPSULE ORAL ONCE
Qty: 4 CAP | Refills: 6 | Status: SHIPPED | OUTPATIENT
Start: 2020-02-10 | End: 2020-02-10

## 2020-02-10 RX ORDER — ATORVASTATIN CALCIUM 40 MG/1
40 TABLET, FILM COATED ORAL DAILY
Qty: 30 TAB | Refills: 3 | Status: SHIPPED | OUTPATIENT
Start: 2020-02-10 | End: 2021-02-08

## 2020-02-22 ENCOUNTER — PATIENT MESSAGE (OUTPATIENT)
Dept: MEDICAL GROUP | Facility: LAB | Age: 80
End: 2020-02-22

## 2020-02-25 NOTE — TELEPHONE ENCOUNTER
From: Mirian Jack  To: Mari Light M.D.  Sent: 2/22/2020 4:45 PM PST  Subject: Prescription Question    Thank you for writing the prescriptions. I went to  the prescriptions at [facility] and they were not there. Where did you send the prescriptions? Mirian Jack      ----- Message -----   From:Mari Light M.D.   Sent:2/10/2020 8:02 AM PST   To:Mirian Jack   Subject:RE: Prescription Question    I sent in the prescription for both the statin and the antibiotic. We will recheck labs in 3 months  Mari Light M.D.      ----- Message -----   From: Mirian Jack   Sent: 2/6/2020 8:31 AM PST   To: Mari Light M.D.  Subject: Prescription Question    I am disappointed to see my results on the heart test. I bought baby aspirin yesterday. Can you prescribe a stantin for me as you suggest and I will give it a try. I remember Blaise had myositis and weakness and that may hamper my activities. Should I decrease the Calcium I am taking? If I need t decrease calcium, should I also go on Fosamax for the osteoporosis?. Also, I need to take an antibiotic before dental work and the dermatologist made me take it on Tuesday before removing my skin cancer. Can you prescribe this for me? I would only take it prior to an intrusive procedure. You could send it to [facility] on [facility] and [facility]. Thank you . Mirian Jack

## 2020-03-19 ENCOUNTER — HOSPITAL ENCOUNTER (OUTPATIENT)
Dept: RADIOLOGY | Facility: MEDICAL CENTER | Age: 80
End: 2020-03-19
Attending: FAMILY MEDICINE
Payer: MEDICARE

## 2020-03-19 DIAGNOSIS — Z12.31 VISIT FOR SCREENING MAMMOGRAM: ICD-10-CM

## 2020-03-19 PROCEDURE — 77067 SCR MAMMO BI INCL CAD: CPT

## 2020-04-14 ENCOUNTER — APPOINTMENT (OUTPATIENT)
Dept: RADIOLOGY | Facility: MEDICAL CENTER | Age: 80
End: 2020-04-14
Attending: EMERGENCY MEDICINE
Payer: MEDICARE

## 2020-04-14 ENCOUNTER — HOSPITAL ENCOUNTER (EMERGENCY)
Facility: MEDICAL CENTER | Age: 80
End: 2020-04-14
Attending: EMERGENCY MEDICINE
Payer: MEDICARE

## 2020-04-14 VITALS
WEIGHT: 158.29 LBS | HEIGHT: 71 IN | BODY MASS INDEX: 22.16 KG/M2 | RESPIRATION RATE: 20 BRPM | SYSTOLIC BLOOD PRESSURE: 108 MMHG | OXYGEN SATURATION: 96 % | TEMPERATURE: 97 F | HEART RATE: 84 BPM | DIASTOLIC BLOOD PRESSURE: 80 MMHG

## 2020-04-14 DIAGNOSIS — M25.562 PAIN IN BOTH KNEES, UNSPECIFIED CHRONICITY: ICD-10-CM

## 2020-04-14 DIAGNOSIS — M25.561 PAIN IN BOTH KNEES, UNSPECIFIED CHRONICITY: ICD-10-CM

## 2020-04-14 DIAGNOSIS — S09.90XA CLOSED HEAD INJURY, INITIAL ENCOUNTER: ICD-10-CM

## 2020-04-14 DIAGNOSIS — S01.01XA LACERATION OF SCALP, INITIAL ENCOUNTER: ICD-10-CM

## 2020-04-14 DIAGNOSIS — M79.652 PAIN OF LEFT THIGH: ICD-10-CM

## 2020-04-14 PROCEDURE — 73552 X-RAY EXAM OF FEMUR 2/>: CPT | Mod: LT

## 2020-04-14 PROCEDURE — 70450 CT HEAD/BRAIN W/O DYE: CPT

## 2020-04-14 PROCEDURE — 90715 TDAP VACCINE 7 YRS/> IM: CPT | Performed by: EMERGENCY MEDICINE

## 2020-04-14 PROCEDURE — 700101 HCHG RX REV CODE 250: Performed by: EMERGENCY MEDICINE

## 2020-04-14 PROCEDURE — 99284 EMERGENCY DEPT VISIT MOD MDM: CPT

## 2020-04-14 PROCEDURE — 90471 IMMUNIZATION ADMIN: CPT

## 2020-04-14 PROCEDURE — 700111 HCHG RX REV CODE 636 W/ 250 OVERRIDE (IP): Performed by: EMERGENCY MEDICINE

## 2020-04-14 PROCEDURE — 73564 X-RAY EXAM KNEE 4 OR MORE: CPT | Mod: LT

## 2020-04-14 PROCEDURE — 304999 HCHG REPAIR-SIMPLE/INTERMED LEVEL 1

## 2020-04-14 PROCEDURE — 73564 X-RAY EXAM KNEE 4 OR MORE: CPT | Mod: RT

## 2020-04-14 PROCEDURE — 72125 CT NECK SPINE W/O DYE: CPT

## 2020-04-14 PROCEDURE — 305308 HCHG STAPLER,SKIN,DISP.

## 2020-04-14 RX ORDER — LIDOCAINE HYDROCHLORIDE AND EPINEPHRINE BITARTRATE 20; .01 MG/ML; MG/ML
10 INJECTION, SOLUTION SUBCUTANEOUS ONCE
Status: COMPLETED | OUTPATIENT
Start: 2020-04-14 | End: 2020-04-14

## 2020-04-14 RX ADMIN — LIDOCAINE HYDROCHLORIDE,EPINEPHRINE BITARTRATE 10 ML: 20; .01 INJECTION, SOLUTION INFILTRATION; PERINEURAL at 18:30

## 2020-04-14 RX ADMIN — CLOSTRIDIUM TETANI TOXOID ANTIGEN (FORMALDEHYDE INACTIVATED), CORYNEBACTERIUM DIPHTHERIAE TOXOID ANTIGEN (FORMALDEHYDE INACTIVATED), BORDETELLA PERTUSSIS TOXOID ANTIGEN (GLUTARALDEHYDE INACTIVATED), BORDETELLA PERTUSSIS FILAMENTOUS HEMAGGLUTININ ANTIGEN (FORMALDEHYDE INACTIVATED), BORDETELLA PERTUSSIS PERTACTIN ANTIGEN, AND BORDETELLA PERTUSSIS FIMBRIAE 2/3 ANTIGEN 0.5 ML: 5; 2; 2.5; 5; 3; 5 INJECTION, SUSPENSION INTRAMUSCULAR at 18:19

## 2020-04-14 ASSESSMENT — FIBROSIS 4 INDEX: FIB4 SCORE: 1.63
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
## 2020-04-15 NOTE — ED NOTES
Patient is stable for discharge at this time, anticipatory guidance provided, close follow-up is encouraged, and ED return instructions have been detailed. Patient and family are both agreeable to the disposition and plan and discharged home in ambulatory state and in good condition.
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
## 2020-08-27 ENCOUNTER — OFFICE VISIT (OUTPATIENT)
Dept: MEDICAL GROUP | Facility: LAB | Age: 80
End: 2020-08-27
Payer: MEDICARE

## 2020-08-27 VITALS
RESPIRATION RATE: 16 BRPM | WEIGHT: 157 LBS | OXYGEN SATURATION: 94 % | DIASTOLIC BLOOD PRESSURE: 70 MMHG | HEIGHT: 69 IN | SYSTOLIC BLOOD PRESSURE: 100 MMHG | BODY MASS INDEX: 23.25 KG/M2 | TEMPERATURE: 98.2 F | HEART RATE: 83 BPM

## 2020-08-27 DIAGNOSIS — F43.21 ADJUSTMENT DISORDER WITH DEPRESSED MOOD: ICD-10-CM

## 2020-08-27 DIAGNOSIS — E78.5 DYSLIPIDEMIA: ICD-10-CM

## 2020-08-27 PROBLEM — D50.9 MICROCYTIC ANEMIA: Status: RESOLVED | Noted: 2017-12-12 | Resolved: 2020-08-27

## 2020-08-27 PROCEDURE — 99214 OFFICE O/P EST MOD 30 MIN: CPT | Performed by: FAMILY MEDICINE

## 2020-08-27 ASSESSMENT — PATIENT HEALTH QUESTIONNAIRE - PHQ9: CLINICAL INTERPRETATION OF PHQ2 SCORE: 0

## 2020-08-27 ASSESSMENT — FIBROSIS 4 INDEX: FIB4 SCORE: 1.63

## 2020-08-27 NOTE — ASSESSMENT & PLAN NOTE
Patient has been  for 56 years and with COVID and her having to be home more she is very much struggling with her  in a relationship.  She is used to being very active.  She would like to see a psychologist to talk through these issues.  She denies any suicidal thoughts or plans.    Depression Screen (PHQ-2/PHQ-9) 12/12/2017 12/5/2019 8/27/2020   PHQ-2 Total Score 2 0 0   PHQ-9 Total Score 2 - -

## 2020-08-27 NOTE — ASSESSMENT & PLAN NOTE
Dyslipidemia Chronic condition. Current treatments: diet/exercise/medicines. She is taking atorvastatin 40 mg daily as directed. Current side effects: none.

## 2020-08-27 NOTE — PROGRESS NOTES
Subjective:     Chief Complaint   Patient presents with   • Paperwork     DMV       Mirian Jack is a 79 y.o. female here today for evaluation and management of:    Adjustment disorder with depressed mood  Patient has been  for 56 years and with COVID and her having to be home more she is very much struggling with her  in a relationship.  She is used to being very active.  She would like to see a psychologist to talk through these issues.  She denies any suicidal thoughts or plans.    Depression Screen (PHQ-2/PHQ-9) 12/12/2017 12/5/2019 8/27/2020   PHQ-2 Total Score 2 0 0   PHQ-9 Total Score 2 - -           Dyslipidemia  Dyslipidemia Chronic condition. Current treatments: diet/exercise/medicines. She is taking atorvastatin 40 mg daily as directed. Current side effects: none.          Allergies   Allergen Reactions   • Aspirin Unspecified     GI upset and sweating if she takes Aspirin or Advil on a regular basis.   • Ibuprofen Unspecified     GI upset and sweating if she takes Advil or Aspirin on a regular basis.       Current medicines (including changes today)  Current Outpatient Medications   Medication Sig Dispense Refill   • atorvastatin (LIPITOR) 40 MG Tab Take 1 Tab by mouth every day. 30 Tab 3   • magnesium gluconate (MAG-G) 500 MG tablet Take 500 mg by mouth 3 times a day.     • Probiotic Product (SOLUBLE FIBER/PROBIOTICS PO) Take  by mouth.     • ascorbic acid (ASCORBIC ACID) 500 MG Tab Take 500 mg by mouth every day.     • Turmeric 500 MG Tab Take  by mouth.     • calcium carbonate (RA CALCIUM) 500 MG Tab Take  by mouth.     • cyanocobalamin (VITAMIN B12) 1000 MCG Tab Take  by mouth.     • Cholecalciferol (VITAMIN D PO) Take  by mouth.     • Multiple Vitamin (MULTI-VITAMINS) Tab Take  by mouth.     • b complex vitamins tablet Take  by mouth.       No current facility-administered medications for this visit.        She  has a past medical history of Cancer (HCC), Cataract, Macular  "degeneration, and Tubular adenoma of colon.    Patient Active Problem List    Diagnosis Date Noted   • Adjustment disorder with depressed mood 08/27/2020   • Macrocytosis without anemia 01/23/2020   • Hyperglycemia 01/23/2020   • Status post right hip replacement 12/05/2019   • Chronic pain of right knee 12/05/2019   • Rheumatoid factor positive 12/05/2019   • Age-related osteoporosis without current pathological fracture 12/05/2019   • Do not resuscitate status 12/05/2019   • Dyslipidemia 12/12/2017   • Renal dysfunction 12/12/2017   • Primary osteoarthritis 02/16/2017   • History of tubular adenoma of the colon    • Aortic atherosclerosis (HCC) 10/12/2016   • History of basal cell carcinoma 11/11/2015   • Hx of LASIK 04/25/2011   • Presence of right artificial knee joint 07/22/2009       ROS   No fever or chills.  No nausea or vomiting.  No chest pain or palpitations.  No cough or SOB.  No pain with urination or hematuria.  No black or bloody stools.       Objective:     /70 (BP Location: Right arm, Patient Position: Sitting, BP Cuff Size: Adult)   Pulse 83   Temp 36.8 °C (98.2 °F) (Temporal)   Resp 16   Ht 1.753 m (5' 9\")   Wt 71.2 kg (157 lb)   SpO2 94%  Body mass index is 23.18 kg/m².   Physical Exam:  Well developed, well nourished.  Alert, oriented in no acute distress.  Eye contact is good, speech goal directed, affect calm  Eyes: conjunctiva non-injected, sclera non-icteric.  Neck Supple.  No adenopathy or masses in the neck or supraclavicular regions. No thyromegaly  Lungs: clear to auscultation bilaterally with good excursion. No wheezes or rhonchi  CV: regular rate and rhythm. No murmur          Assessment and Plan:   The following treatment plan was discussed    1. Dyslipidemia  This is a chronic medical condition that is currently stable  Continue atorvastatin 40 mg daily    2. Adjustment disorder with depressed mood  This is a new problem to me.  Refer to psychology for further evaluation " treatment  - REFERRAL TO PSYCHOLOGY  3.  DMV paperwork filled out      Any change or worsening of signs or symptoms, patient encouraged to follow-up or report to the emergency room for further evaluation. Patient understands and agrees.    Followup: Return in about 4 months (around 12/27/2020) for AWV.

## 2021-01-11 DIAGNOSIS — Z23 NEED FOR VACCINATION: ICD-10-CM

## 2021-02-08 ENCOUNTER — PATIENT MESSAGE (OUTPATIENT)
Dept: MEDICAL GROUP | Facility: LAB | Age: 81
End: 2021-02-08

## 2021-02-08 ENCOUNTER — TELEMEDICINE (OUTPATIENT)
Dept: MEDICAL GROUP | Facility: LAB | Age: 81
End: 2021-02-08
Payer: MEDICARE

## 2021-02-08 VITALS — WEIGHT: 151 LBS | HEIGHT: 69 IN | BODY MASS INDEX: 22.36 KG/M2

## 2021-02-08 DIAGNOSIS — I25.10 CORONARY ARTERY CALCIFICATION SEEN ON CAT SCAN: ICD-10-CM

## 2021-02-08 DIAGNOSIS — R06.09 DYSPNEA ON EXERTION: ICD-10-CM

## 2021-02-08 PROCEDURE — 99214 OFFICE O/P EST MOD 30 MIN: CPT | Mod: 95,CR | Performed by: FAMILY MEDICINE

## 2021-02-08 RX ORDER — ATORVASTATIN CALCIUM 80 MG/1
80 TABLET, FILM COATED ORAL EVERY EVENING
Qty: 90 TAB | Refills: 1 | Status: SHIPPED | OUTPATIENT
Start: 2021-02-08 | End: 2021-02-09 | Stop reason: SDUPTHER

## 2021-02-08 ASSESSMENT — FIBROSIS 4 INDEX: FIB4 SCORE: 1.65
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
## 2021-02-08 NOTE — ASSESSMENT & PLAN NOTE
Coronary calcification:  LMA - 10.5  LCX - 0.0  LAD - 493.5  RCA - 0.0  PDA - 0.0     Total Calcium Score: 504

## 2021-02-08 NOTE — PATIENT INSTRUCTIONS
Angina    Angina is extreme discomfort in the chest, neck, arm, jaw, or back. The discomfort is caused by a lack of blood in the middle layer of the heart wall (myocardium).  There are four types of angina:  · Stable angina. This is triggered by vigorous activity or exercise. It goes away when you rest or take angina medicine.  · Unstable angina. This is a warning sign and can lead to a heart attack (acute coronary syndrome). This is a medical emergency. Symptoms come at rest and last a long time.  · Microvascular angina. This affects the small coronary arteries. Symptoms include feeling tired and being short of breath.  · Prinzmetal or variant angina. This is caused by a tightening (spasm) of the arteries that go to your heart.  What are the causes?  This condition is caused by atherosclerosis. This is the buildup of fat and cholesterol (plaque) in your arteries. The plaque may narrow or block the artery.  Other causes of angina include:  · Sudden tightening of the muscles of the arteries in the heart (coronary spasm).  · Small artery disease (microvascular dysfunction).  · Problems with any of your heart valves (heart valve disease).  · A tear in an artery in your heart (coronary artery dissection).  · Diseases of the heart muscle (cardiomyopathy), or other heart diseases.  What increases the risk?  You are more likely to develop this condition if you have:  · High cholesterol.  · High blood pressure (hypertension).  · Diabetes.  · A family history of heart disease.  · An inactive (sedentary) lifestyle, or you do not exercise enough.  · Depression.  · Had radiation treatment to the left side of your chest.  Other risk factors include:  · Using tobacco.  · Being obese.  · Eating a diet high in saturated fats.  · Being exposed to high stress or triggers of stress.  · Using drugs, such as cocaine.  Women have a greater risk for angina if:  · They are older than 55.  · They have gone through menopause (are  postmenopausal).  What are the signs or symptoms?  Common symptoms of this condition in both men and women may include:  · Chest pain, which may:  ? Feel like a crushing or squeezing in the chest, or like a tightness, pressure, fullness, or heaviness in the chest.  ? Last for more than a few minutes at a time, or it may stop and come back (recur) over the course of a few minutes.  · Pain in the neck, arm, jaw, or back.  · Unexplained heartburn or indigestion.  · Shortness of breath.  · Nausea.  · Sudden cold sweats.  Women and people with diabetes may have unusual (atypical) symptoms, such as:  · Fatigue.  · Unexplained feelings of nervousness or anxiety.  · Unexplained weakness.  · Dizziness or fainting.  How is this diagnosed?  This condition may be diagnosed based on:  · Your symptoms and medical history.  · Electrocardiogram (ECG) to measure the electrical activity in your heart.  · Blood tests.  · Stress test to look for signs of blockage when your heart is stressed.  · CT angiogram to examine your heart and the blood flow to it.  · Coronary angiogram to check your coronary arteries for blockage.  How is this treated?  Angina may be treated with:  · Medicines to:  ? Prevent blood clots and heart attack.  ? Relax blood vessels and improve blood flow to the heart (nitrates).  ? Reduce blood pressure, improve the pumping action of the heart, and relax blood vessels that are spasming.  ? Reduce cholesterol and help treat atherosclerosis.  · A procedure to widen a narrowed or blocked coronary artery (angioplasty). A mesh tube may be placed in a coronary artery to keep it open (coronary stenting).  · Surgery to allow blood to go around a blocked artery (coronary artery bypass surgery).  Follow these instructions at home:  Medicines  · Take over-the-counter and prescription medicines only as told by your health care provider.  · Do not take the following medicines unless your health care provider approves:  ? NSAIDs,  such as ibuprofen or naproxen.  ? Vitamin supplements that contain vitamin A, vitamin E, or both.  ? Hormone replacement therapy that contains estrogen with or without progestin.  Eating and drinking    · Eat a heart-healthy diet. This includes plenty of fresh fruits and vegetables, whole grains, low-fat (lean) protein, and low-fat dairy products.  · Follow instructions from your health care provider about eating or drinking restrictions.  Activity  · Follow an exercise program approved by your health care provider.  · Consider joining a cardiac rehabilitation program.  · Take a break when you feel fatigued. Plan rest periods in your daily activities.  Lifestyle    · Do not use any products that contain nicotine or tobacco, such as cigarettes, e-cigarettes, and chewing tobacco. If you need help quitting, ask your health care provider.  · If your health care provider says you can drink alcohol:  ? Limit how much you use to:  § 0-1 drink a day for nonpregnant women.  § 0-2 drinks a day for men.  ? Be aware of how much alcohol is in your drink. In the [place] one drink equals one 12 oz bottle of beer (355 mL), one 5 oz glass of wine (148 mL), or one 1½ oz glass of hard liquor (44 mL).  General instructions  · Maintain a healthy weight.  · Learn to manage stress.  · Keep your vaccinations up to date. Get the flu (influenza) vaccine every year.  · Talk to your health care provider if you feel depressed. Take a depression screening test to see if you are at risk for depression.  · Work with your health care provider to manage other health conditions, such as hypertension or diabetes.  · Keep all follow-up visits as told by your health care provider. This is important.  Get help right away if:  · You have pain in your chest, neck, arm, jaw, or back, and the pain:  ? Lasts more than a few minutes.  ? Is recurring.  ? Is not relieved by taking medicines under the tongue (sublingual nitroglycerin).  ? Increases in intensity or  frequency.  · You have a lot of sweating without cause.  · You have unexplained:  ? Heartburn or indigestion.  ? Shortness of breath or difficulty breathing.  ? Nausea or vomiting.  ? Fatigue.  ? Feelings of nervousness or anxiety.  ? Weakness.  · You have sudden light-headedness or dizziness.  · You faint.  These symptoms may represent a serious problem that is an emergency. Do not wait to see if the symptoms will go away. Get medical help right away. Call your local emergency services (911 in the [place] Do not drive yourself to the hospital.  Summary  · Angina is extreme discomfort in the chest, neck, arm, jaw, or back that is caused by a lack of blood in the heart wall.  · There are many symptoms of angina. They include chest pain, unexplained heartburn or indigestion, sudden cold sweats, and fatigue.  · Angina may be treated with behavioral changes, medicine, or surgery.  · Symptoms of angina may represent an emergency. Get medical help right away. Call your local emergency services (911 in the [place] Do not drive yourself to the hospital.  This information is not intended to replace advice given to you by your health care provider. Make sure you discuss any questions you have with your health care provider.  Document Released: 12/18/2006 Document Revised: 08/05/2019 Document Reviewed: 08/05/2019  Elsevier Patient Education © 2020 [employer]

## 2021-02-09 RX ORDER — ATORVASTATIN CALCIUM 80 MG/1
80 TABLET, FILM COATED ORAL EVERY EVENING
Qty: 90 TAB | Refills: 1 | Status: SHIPPED | OUTPATIENT
Start: 2021-02-09 | End: 2021-05-19 | Stop reason: SINTOL

## 2021-02-09 NOTE — TELEPHONE ENCOUNTER
From: Mirian Jack  To: Physician Mari Light  Sent: 2/8/2021 8:47 PM PST  Subject: Prescription Question    I was able to make my appointment for an echocardiogram in March. I went to [facility] on [facility] at 5: 00 PM and they did not have a prescription for Liportoir as we discussed this morning. Please either send the prescription to [facility] or send me a prescription I can print out to have it filled. Thank you very much. Mirian

## 2021-02-09 NOTE — PATIENT COMMUNICATION
Received request via: Patient    Was the patient seen in the last year in this department? Yes  LOV: 02/2021    Does the patient have an active prescription (recently filled or refills available) for medication(s) requested? Patient requesting transfer to different pharmacy.

## 2021-02-23 ENCOUNTER — OFFICE VISIT (OUTPATIENT)
Dept: CARDIOLOGY | Facility: MEDICAL CENTER | Age: 81
End: 2021-02-23
Payer: MEDICARE

## 2021-02-23 VITALS
HEIGHT: 69 IN | DIASTOLIC BLOOD PRESSURE: 76 MMHG | SYSTOLIC BLOOD PRESSURE: 108 MMHG | BODY MASS INDEX: 23.11 KG/M2 | HEART RATE: 80 BPM | WEIGHT: 156 LBS | OXYGEN SATURATION: 96 %

## 2021-02-23 DIAGNOSIS — I25.10 CORONARY ARTERY CALCIFICATION SEEN ON CAT SCAN: ICD-10-CM

## 2021-02-23 DIAGNOSIS — E78.5 DYSLIPIDEMIA: ICD-10-CM

## 2021-02-23 PROCEDURE — 99204 OFFICE O/P NEW MOD 45 MIN: CPT | Performed by: INTERNAL MEDICINE

## 2021-02-23 RX ORDER — AMOXICILLIN 500 MG/1
CAPSULE ORAL
COMMUNITY
Start: 2021-02-11 | End: 2022-06-20

## 2021-02-23 RX ORDER — CHLORHEXIDINE GLUCONATE ORAL RINSE 1.2 MG/ML
SOLUTION DENTAL
COMMUNITY
Start: 2021-02-11 | End: 2021-11-01

## 2021-02-23 RX ORDER — HYDROCODONE BITARTRATE AND ACETAMINOPHEN 5; 325 MG/1; MG/1
TABLET ORAL
COMMUNITY
Start: 2021-02-11 | End: 2021-05-05

## 2021-02-23 ASSESSMENT — ENCOUNTER SYMPTOMS
CARDIOVASCULAR NEGATIVE: 1
GASTROINTESTINAL NEGATIVE: 1
PALPITATIONS: 0
CONSTITUTIONAL NEGATIVE: 1
NEUROLOGICAL NEGATIVE: 1
SHORTNESS OF BREATH: 1

## 2021-02-23 ASSESSMENT — FIBROSIS 4 INDEX: FIB4 SCORE: 1.65

## 2021-02-23 NOTE — LETTER
"     [facility]   [address]   [address]  [place] [address]  Phone: [phone]  Fax: [phone]              Mirian Jack  1940    Encounter Date: 2021    Itz Gee M.D.          PROGRESS NOTE:  Chief Complaint   Patient presents with   • Shortness of Breath       Subjective:   Mirian Jack is a 80 y.o. female who is seen in consultation at the request of Dr. Mari Light for evaluation of exertional dyspnea and abnormal coronary calcification scan by CT.  The patient is quite physically active for her age and notes that for about the past 2 months she has had some exertional dyspnea.  She notes this when she is carrying her skis up to the ski slope or when she is exercising.  She has noted mild \"tightness\" in her left chest at least 1 time while at high altitude and exerting herself.  There is no history of known coronary disease, history of exertional chest pain other than described above, prior history of exercise tolerance or history of lung disease.  She had a recent CT coronary calcification study with a score of over 500, mostly in the LAD distribution.  This placed her in the 75th percentile for age.  Recent lab revealed LDL of 164 and non-HDL cholesterol of 184.  LDL is 77.  She is not a smoker, there is no family history of premature coronary disease.  She is not diabetic.    Family history: Father  of a stroke at age 67 mother and sudden cardiac death at age 67  Past Medical History:   Diagnosis Date   • Cancer (HCC)     basal cell and squamous cell    • Cataract    • Macular degeneration    • Tubular adenoma of colon      Past Surgical History:   Procedure Laterality Date   • LAMINOTOMY      L4-5   • EYE SURGERY      lasik and cataracts   • FOOT SURGERY     • KNEE ARTHROPLASTY TOTAL Right      Family History   Problem Relation Age of Onset   • Heart Disease Mother    • Stroke Father      Social History     "     Socioeconomic History   • Marital status:      Spouse name: Not on file   • Number of children: Not on file   • Years of education: Not on file   • Highest education level: Not on file   Occupational History   • Not on file   Tobacco Use   • Smoking status: Never Smoker   • Smokeless tobacco: Never Used   Substance and Sexual Activity   • Alcohol use: Yes     Alcohol/week: 6.0 oz     Types: 10 Glasses of wine per week   • Drug use: Never   • Sexual activity: Yes     Partners: Male     Birth control/protection: Post-Menopausal   Other Topics Concern   • Not on file   Social History Narrative   • Not on file     Social Determinants of Health     Financial Resource Strain:    • Difficulty of Paying Living Expenses:    Food Insecurity:    • Worried About Running Out of Food in the Last Year:    • Ran Out of Food in the Last Year:    Transportation Needs:    • Lack of Transportation (Medical):    • Lack of Transportation (Non-Medical):    Physical Activity:    • Days of Exercise per Week:    • Minutes of Exercise per Session:    Stress:    • Feeling of Stress :    Social Connections:    • Frequency of Communication with Friends and Family:    • Frequency of Social Gatherings with Friends and Family:    • Attends Hinduism Services:    • Active Member of Clubs or Organizations:    • Attends Club or Organization Meetings:    • Marital Status:    Intimate Partner Violence:    • Fear of Current or Ex-Partner:    • Emotionally Abused:    • Physically Abused:    • Sexually Abused:      Allergies   Allergen Reactions   • Ibuprofen Unspecified     GI upset and sweating if she takes Advil or Aspirin on a regular basis.     Outpatient Encounter Medications as of 2/23/2021   Medication Sig Dispense Refill   • amoxicillin (AMOXIL) 500 MG Cap TAKE 2 CAPSULES BY MOUTH 1 HOUR BEFORE APPOINTMENT THEN TAKE 1 CAPSULE BY MOUTH THREE TIMES DAILY UNTIL GONE     • chlorhexidine (PERIDEX) 0.12 % Solution      •  "HYDROcodone-acetaminophen (NORCO) 5-325 MG Tab per tablet TAKE 1 TO 2 TABLETS BY MOUTH EVERY 4 TO 6 HOURS AS NEEDED FOR 2 DAYS     • magnesium gluconate (MAG-G) 500 MG tablet Take 500 mg by mouth 3 times a day.     • Probiotic Product (SOLUBLE FIBER/PROBIOTICS PO) Take  by mouth.     • ascorbic acid (ASCORBIC ACID) 500 MG Tab Take 500 mg by mouth every day.     • Turmeric 500 MG Tab Take  by mouth.     • calcium carbonate (RA CALCIUM) 500 MG Tab Take  by mouth.     • cyanocobalamin (VITAMIN B12) 1000 MCG Tab Take  by mouth.     • Cholecalciferol (VITAMIN D PO) Take 5,000 Units by mouth.     • Multiple Vitamin (MULTI-VITAMINS) Tab Take  by mouth.     • b complex vitamins tablet Take  by mouth.     • atorvastatin (LIPITOR) 80 MG tablet Take 1 Tab by mouth every evening. (Patient not taking: Reported on 2/23/2021) 90 Tab 1     No facility-administered encounter medications on file as of 2/23/2021.     Review of Systems   Constitutional: Negative.    HENT: Negative.    Respiratory: Positive for shortness of breath.         Exertional dyspnea   Cardiovascular: Negative.  Negative for chest pain and palpitations.   Gastrointestinal: Negative.    Musculoskeletal: Positive for joint pain.   Skin: Negative.    Neurological: Negative.    Endo/Heme/Allergies: Negative.         Objective:   /76 (BP Location: Left arm, Patient Position: Sitting, BP Cuff Size: Adult)   Pulse 80   Ht 1.753 m (5' 9\")   Wt 70.8 kg (156 lb)   LMP  (LMP Unknown)   SpO2 96%   BMI 23.04 kg/m²     Physical Exam   Constitutional: She is oriented to person, place, and time. No distress.   HENT:   Head: Normocephalic and atraumatic.   Eyes: Pupils are equal, round, and reactive to light. No scleral icterus.   Neck: No JVD present.   Cardiovascular: Normal rate and regular rhythm.   No murmur heard.  Pulmonary/Chest: No respiratory distress. She has no wheezes.   Abdominal: Soft. She exhibits no distension. There is no abdominal tenderness. "   Musculoskeletal:         General: No edema.      Comments: Muscle loss in hands bilaterally.  Joint deformity of the thumbs bilaterally.   Neurological: She is alert and oriented to person, place, and time.   Skin: Skin is warm.   Psychiatric: She has a normal mood and affect.       Assessment:     1. Dyslipidemia  Treadmill Stress    Lipid Profile   2. Coronary artery calcification seen on CAT scan  Treadmill Stress    Lipid Profile       Medical Decision Making:  Today's Assessment / Status / Plan:   Exertional dyspnea: Physical examination is unremarkable.  There is no history of lung disease.  I am concerned that her dyspnea may be secondary to coronary artery disease, particularly in light of her abnormal coronary calcification score and her history of mild chest tightness.  A treadmill has been ordered.  This would also give us a quantitative evaluation of her exercise tolerance.  An echocardiogram has been ordered to evaluate her LV systolic function.  I hear no murmurs on exam today.    Abnormal CT coronary calcification score:  I have personally reviewed the images with the patient.  The vast majority the calcification is in the LAD and it is highly calcified.  As noted above the treadmill has been ordered.    Hyperlipidemia: Concur with Dr. Light that she should be on statins.  She is a bit reluctant to start and has in fact not started them yet.  Recommend she start at 40 mg a day (half of her 80 mg tablet).  Per ACC guidelines, her target LDL is a 30-50% reduction from baseline.  As she is quite active and otherwise intact I would recommend aggressive treatment with a target LDL in the 80 range.    Plan:  Encourage patient to start atorvastatin, initially at 40 mg a day  Treadmill testing has been ordered  Echo has been ordered previously  Lipid profile in 2 months.            Mari Light M.D.  [address] [id number]  [facility] [address]  Via In Basket

## 2021-02-23 NOTE — PROGRESS NOTES
"Chief Complaint   Patient presents with   • Shortness of Breath       Subjective:   Mirian Jack is a 80 y.o. female who is seen in consultation at the request of Dr. Mari Light for evaluation of exertional dyspnea and abnormal coronary calcification scan by CT.  The patient is quite physically active for her age and notes that for about the past 2 months she has had some exertional dyspnea.  She notes this when she is carrying her skis up to the ski slope or when she is exercising.  She has noted mild \"tightness\" in her left chest at least 1 time while at high altitude and exerting herself.  There is no history of known coronary disease, history of exertional chest pain other than described above, prior history of exercise tolerance or history of lung disease.  She had a recent CT coronary calcification study with a score of over 500, mostly in the LAD distribution.  This placed her in the 75th percentile for age.  Recent lab revealed LDL of 164 and non-HDL cholesterol of 184.  LDL is 77.  She is not a smoker, there is no family history of premature coronary disease.  She is not diabetic.    Family history: Father  of a stroke at age 67 mother and sudden cardiac death at age 67  Past Medical History:   Diagnosis Date   • Cancer (HCC)     basal cell and squamous cell    • Cataract    • Macular degeneration    • Tubular adenoma of colon      Past Surgical History:   Procedure Laterality Date   • LAMINOTOMY      L4-5   • EYE SURGERY      lasik and cataracts   • FOOT SURGERY     • KNEE ARTHROPLASTY TOTAL Right      Family History   Problem Relation Age of Onset   • Heart Disease Mother    • Stroke Father      Social History     Socioeconomic History   • Marital status:      Spouse name: Not on file   • Number of children: Not on file   • Years of education: Not on file   • Highest education level: Not on file   Occupational History   • Not on file   Tobacco Use   • Smoking status: Never Smoker   • " Smokeless tobacco: Never Used   Substance and Sexual Activity   • Alcohol use: Yes     Alcohol/week: 6.0 oz     Types: 10 Glasses of wine per week   • Drug use: Never   • Sexual activity: Yes     Partners: Male     Birth control/protection: Post-Menopausal   Other Topics Concern   • Not on file   Social History Narrative   • Not on file     Social Determinants of Health     Financial Resource Strain:    • Difficulty of Paying Living Expenses:    Food Insecurity:    • Worried About Running Out of Food in the Last Year:    • Ran Out of Food in the Last Year:    Transportation Needs:    • Lack of Transportation (Medical):    • Lack of Transportation (Non-Medical):    Physical Activity:    • Days of Exercise per Week:    • Minutes of Exercise per Session:    Stress:    • Feeling of Stress :    Social Connections:    • Frequency of Communication with Friends and Family:    • Frequency of Social Gatherings with Friends and Family:    • Attends Gnosticism Services:    • Active Member of Clubs or Organizations:    • Attends Club or Organization Meetings:    • Marital Status:    Intimate Partner Violence:    • Fear of Current or Ex-Partner:    • Emotionally Abused:    • Physically Abused:    • Sexually Abused:      Allergies   Allergen Reactions   • Ibuprofen Unspecified     GI upset and sweating if she takes Advil or Aspirin on a regular basis.     Outpatient Encounter Medications as of 2/23/2021   Medication Sig Dispense Refill   • amoxicillin (AMOXIL) 500 MG Cap TAKE 2 CAPSULES BY MOUTH 1 HOUR BEFORE APPOINTMENT THEN TAKE 1 CAPSULE BY MOUTH THREE TIMES DAILY UNTIL GONE     • chlorhexidine (PERIDEX) 0.12 % Solution      • HYDROcodone-acetaminophen (NORCO) 5-325 MG Tab per tablet TAKE 1 TO 2 TABLETS BY MOUTH EVERY 4 TO 6 HOURS AS NEEDED FOR 2 DAYS     • magnesium gluconate (MAG-G) 500 MG tablet Take 500 mg by mouth 3 times a day.     • Probiotic Product (SOLUBLE FIBER/PROBIOTICS PO) Take  by mouth.     • ascorbic acid  "(ASCORBIC ACID) 500 MG Tab Take 500 mg by mouth every day.     • Turmeric 500 MG Tab Take  by mouth.     • calcium carbonate (RA CALCIUM) 500 MG Tab Take  by mouth.     • cyanocobalamin (VITAMIN B12) 1000 MCG Tab Take  by mouth.     • Cholecalciferol (VITAMIN D PO) Take 5,000 Units by mouth.     • Multiple Vitamin (MULTI-VITAMINS) Tab Take  by mouth.     • b complex vitamins tablet Take  by mouth.     • atorvastatin (LIPITOR) 80 MG tablet Take 1 Tab by mouth every evening. (Patient not taking: Reported on 2/23/2021) 90 Tab 1     No facility-administered encounter medications on file as of 2/23/2021.     Review of Systems   Constitutional: Negative.    HENT: Negative.    Respiratory: Positive for shortness of breath.         Exertional dyspnea   Cardiovascular: Negative.  Negative for chest pain and palpitations.   Gastrointestinal: Negative.    Musculoskeletal: Positive for joint pain.   Skin: Negative.    Neurological: Negative.    Endo/Heme/Allergies: Negative.         Objective:   /76 (BP Location: Left arm, Patient Position: Sitting, BP Cuff Size: Adult)   Pulse 80   Ht 1.753 m (5' 9\")   Wt 70.8 kg (156 lb)   LMP  (LMP Unknown)   SpO2 96%   BMI 23.04 kg/m²     Physical Exam   Constitutional: She is oriented to person, place, and time. No distress.   HENT:   Head: Normocephalic and atraumatic.   Eyes: Pupils are equal, round, and reactive to light. No scleral icterus.   Neck: No JVD present.   Cardiovascular: Normal rate and regular rhythm.   No murmur heard.  Pulmonary/Chest: No respiratory distress. She has no wheezes.   Abdominal: Soft. She exhibits no distension. There is no abdominal tenderness.   Musculoskeletal:         General: No edema.      Comments: Muscle loss in hands bilaterally.  Joint deformity of the thumbs bilaterally.   Neurological: She is alert and oriented to person, place, and time.   Skin: Skin is warm.   Psychiatric: She has a normal mood and affect.       Assessment:     1. " Dyslipidemia  Treadmill Stress    Lipid Profile   2. Coronary artery calcification seen on CAT scan  Treadmill Stress    Lipid Profile       Medical Decision Making:  Today's Assessment / Status / Plan:   Exertional dyspnea: Physical examination is unremarkable.  There is no history of lung disease.  I am concerned that her dyspnea may be secondary to coronary artery disease, particularly in light of her abnormal coronary calcification score and her history of mild chest tightness.  A treadmill has been ordered.  This would also give us a quantitative evaluation of her exercise tolerance.  An echocardiogram has been ordered to evaluate her LV systolic function.  I hear no murmurs on exam today.    Abnormal CT coronary calcification score:  I have personally reviewed the images with the patient.  The vast majority the calcification is in the LAD and it is highly calcified.  As noted above the treadmill has been ordered.    Hyperlipidemia: Concur with Dr. Light that she should be on statins.  She is a bit reluctant to start and has in fact not started them yet.  Recommend she start at 40 mg a day (half of her 80 mg tablet).  Per ACC guidelines, her target LDL is a 30-50% reduction from baseline.  As she is quite active and otherwise intact I would recommend aggressive treatment with a target LDL in the 80 range.    Plan:  Encourage patient to start atorvastatin, initially at 40 mg a day  Treadmill testing has been ordered  Echo has been ordered previously  Lipid profile in 2 months.

## 2021-03-01 ENCOUNTER — NON-PROVIDER VISIT (OUTPATIENT)
Dept: CARDIOLOGY | Facility: MEDICAL CENTER | Age: 81
End: 2021-03-01
Payer: MEDICARE

## 2021-03-01 VITALS
SYSTOLIC BLOOD PRESSURE: 128 MMHG | BODY MASS INDEX: 23.11 KG/M2 | WEIGHT: 156 LBS | OXYGEN SATURATION: 96 % | HEART RATE: 73 BPM | DIASTOLIC BLOOD PRESSURE: 74 MMHG | HEIGHT: 69 IN

## 2021-03-01 DIAGNOSIS — R06.09 DOE (DYSPNEA ON EXERTION): ICD-10-CM

## 2021-03-01 DIAGNOSIS — I25.10 CORONARY ARTERY CALCIFICATION SEEN ON CAT SCAN: ICD-10-CM

## 2021-03-01 LAB — TREADMILL STRESS: NORMAL

## 2021-03-01 PROCEDURE — 93015 CV STRESS TEST SUPVJ I&R: CPT | Performed by: INTERNAL MEDICINE

## 2021-03-01 ASSESSMENT — FIBROSIS 4 INDEX: FIB4 SCORE: 1.65

## 2021-03-02 ENCOUNTER — TELEPHONE (OUTPATIENT)
Dept: CARDIOLOGY | Facility: MEDICAL CENTER | Age: 81
End: 2021-03-02

## 2021-03-02 NOTE — TELEPHONE ENCOUNTER
----- Message from Itz Gee M.D. sent at 3/2/2021  9:07 AM PST -----  Regarding: Treadmill  Treadmill reviewed. No ischemia and exercise tolerance of 10 mets is normal and actually pretty darn good for 80 years old. She was worried about exertional dyspnea.

## 2021-03-17 ENCOUNTER — HOSPITAL ENCOUNTER (OUTPATIENT)
Dept: CARDIOLOGY | Facility: MEDICAL CENTER | Age: 81
End: 2021-03-17
Attending: FAMILY MEDICINE
Payer: MEDICARE

## 2021-03-17 DIAGNOSIS — R06.09 DYSPNEA ON EXERTION: ICD-10-CM

## 2021-03-17 DIAGNOSIS — I25.10 CORONARY ARTERY CALCIFICATION SEEN ON CAT SCAN: ICD-10-CM

## 2021-03-17 LAB
LV EJECT FRACT MOD 2C 99903: 70.42
LV EJECT FRACT MOD 4C 99902: 51.59
LV EJECT FRACT MOD BP 99901: 61.82

## 2021-03-17 PROCEDURE — 93306 TTE W/DOPPLER COMPLETE: CPT | Mod: 26 | Performed by: STUDENT IN AN ORGANIZED HEALTH CARE EDUCATION/TRAINING PROGRAM

## 2021-03-17 PROCEDURE — 93306 TTE W/DOPPLER COMPLETE: CPT

## 2021-04-22 ENCOUNTER — HOSPITAL ENCOUNTER (OUTPATIENT)
Dept: LAB | Facility: MEDICAL CENTER | Age: 81
End: 2021-04-22
Attending: INTERNAL MEDICINE
Payer: MEDICARE

## 2021-04-22 DIAGNOSIS — I25.10 CORONARY ARTERY CALCIFICATION SEEN ON CAT SCAN: ICD-10-CM

## 2021-04-22 DIAGNOSIS — E78.5 DYSLIPIDEMIA: ICD-10-CM

## 2021-04-22 LAB
CHOLEST SERPL-MCNC: 185 MG/DL (ref 100–199)
FASTING STATUS PATIENT QL REPORTED: NORMAL
HDLC SERPL-MCNC: 72 MG/DL
LDLC SERPL CALC-MCNC: 98 MG/DL
TRIGL SERPL-MCNC: 73 MG/DL (ref 0–149)

## 2021-04-22 PROCEDURE — 36415 COLL VENOUS BLD VENIPUNCTURE: CPT

## 2021-04-22 PROCEDURE — 80061 LIPID PANEL: CPT

## 2021-05-05 ENCOUNTER — OFFICE VISIT (OUTPATIENT)
Dept: CARDIOLOGY | Facility: MEDICAL CENTER | Age: 81
End: 2021-05-05
Payer: MEDICARE

## 2021-05-05 VITALS
WEIGHT: 153 LBS | HEART RATE: 90 BPM | OXYGEN SATURATION: 92 % | SYSTOLIC BLOOD PRESSURE: 98 MMHG | DIASTOLIC BLOOD PRESSURE: 52 MMHG | BODY MASS INDEX: 22.66 KG/M2 | HEIGHT: 69 IN

## 2021-05-05 DIAGNOSIS — I25.10 CORONARY ARTERY CALCIFICATION SEEN ON CAT SCAN: ICD-10-CM

## 2021-05-05 DIAGNOSIS — E78.5 DYSLIPIDEMIA: ICD-10-CM

## 2021-05-05 DIAGNOSIS — R06.09 DOE (DYSPNEA ON EXERTION): ICD-10-CM

## 2021-05-05 PROCEDURE — 99213 OFFICE O/P EST LOW 20 MIN: CPT | Performed by: INTERNAL MEDICINE

## 2021-05-05 ASSESSMENT — ENCOUNTER SYMPTOMS
RESPIRATORY NEGATIVE: 1
NEUROLOGICAL NEGATIVE: 1
CONSTITUTIONAL NEGATIVE: 1
GASTROINTESTINAL NEGATIVE: 1
MUSCULOSKELETAL NEGATIVE: 1
CARDIOVASCULAR NEGATIVE: 1

## 2021-05-05 ASSESSMENT — FIBROSIS 4 INDEX: FIB4 SCORE: 1.65

## 2021-05-05 NOTE — PROGRESS NOTES
Chief Complaint   Patient presents with   • Hyperlipidemia       Subjective:   Mirian Jack is a 80 y.o. female who presents today for follow-up of abnormal CT coronary calcification scan exertional dyspnea.  She was seen initially in February because of a history of a coronary CT scan with a score of over 500 which placed in the 75th percentile for age.  Her baseline LDL was 164.  The patient was started on statin at 40 mg atorvastatin daily.  Her LDL is dropped from 164-98 and her non-HDL cholesterol is now 113.  She has had some nonspecific muscle weakness and wonders if it is secondary to statin.  The patient had a treadmill performed last month that revealed no evidence of ischemia and an exercise tolerance of 9 minutes which is adequate.    Past Medical History:   Diagnosis Date   • Cancer (HCC)     basal cell and squamous cell    • Cataract    • Macular degeneration    • Tubular adenoma of colon      Past Surgical History:   Procedure Laterality Date   • LAMINOTOMY  2000    L4-5   • EYE SURGERY      lasik and cataracts   • FOOT SURGERY     • KNEE ARTHROPLASTY TOTAL Right      Family History   Problem Relation Age of Onset   • Heart Disease Mother    • Stroke Father      Social History     Socioeconomic History   • Marital status:      Spouse name: Not on file   • Number of children: Not on file   • Years of education: Not on file   • Highest education level: Not on file   Occupational History   • Not on file   Tobacco Use   • Smoking status: Never Smoker   • Smokeless tobacco: Never Used   Substance and Sexual Activity   • Alcohol use: Yes     Alcohol/week: 6.0 oz     Types: 10 Glasses of wine per week   • Drug use: Never   • Sexual activity: Yes     Partners: Male     Birth control/protection: Post-Menopausal   Other Topics Concern   • Not on file   Social History Narrative   • Not on file     Social Determinants of Health     Financial Resource Strain:    • Difficulty of Paying Living Expenses:     Food Insecurity:    • Worried About Running Out of Food in the Last Year:    • Ran Out of Food in the Last Year:    Transportation Needs:    • Lack of Transportation (Medical):    • Lack of Transportation (Non-Medical):    Physical Activity:    • Days of Exercise per Week:    • Minutes of Exercise per Session:    Stress:    • Feeling of Stress :    Social Connections:    • Frequency of Communication with Friends and Family:    • Frequency of Social Gatherings with Friends and Family:    • Attends Buddhism Services:    • Active Member of Clubs or Organizations:    • Attends Club or Organization Meetings:    • Marital Status:    Intimate Partner Violence:    • Fear of Current or Ex-Partner:    • Emotionally Abused:    • Physically Abused:    • Sexually Abused:      Allergies   Allergen Reactions   • Ibuprofen Unspecified     GI upset and sweating if she takes Advil or Aspirin on a regular basis.     Outpatient Encounter Medications as of 5/5/2021   Medication Sig Dispense Refill   • aspirin EC (ECOTRIN) 81 MG Tablet Delayed Response Take 81 mg by mouth every day.     • amoxicillin (AMOXIL) 500 MG Cap TAKE 2 CAPSULES BY MOUTH 1 HOUR BEFORE APPOINTMENT THEN TAKE 1 CAPSULE BY MOUTH THREE TIMES DAILY UNTIL GONE     • chlorhexidine (PERIDEX) 0.12 % Solution      • atorvastatin (LIPITOR) 80 MG tablet Take 1 Tab by mouth every evening. (Patient taking differently: Take 40 mg by mouth every evening.) 90 Tab 1   • Probiotic Product (SOLUBLE FIBER/PROBIOTICS PO) Take  by mouth.     • ascorbic acid (ASCORBIC ACID) 500 MG Tab Take 500 mg by mouth every day.     • Turmeric 500 MG Tab Take  by mouth.     • Cholecalciferol (VITAMIN D PO) Take 5,000 Units by mouth.     • Multiple Vitamin (MULTI-VITAMINS) Tab Take  by mouth.     • b complex vitamins tablet Take  by mouth.     • HYDROcodone-acetaminophen (NORCO) 5-325 MG Tab per tablet TAKE 1 TO 2 TABLETS BY MOUTH EVERY 4 TO 6 HOURS AS NEEDED FOR 2 DAYS     • magnesium gluconate  "(MAG-G) 500 MG tablet Take 500 mg by mouth 3 times a day.     • calcium carbonate (RA CALCIUM) 500 MG Tab Take  by mouth.     • cyanocobalamin (VITAMIN B12) 1000 MCG Tab Take  by mouth.       No facility-administered encounter medications on file as of 5/5/2021.     Review of Systems   Constitutional: Negative.    HENT: Negative.    Respiratory: Negative.    Cardiovascular: Negative.    Gastrointestinal: Negative.    Musculoskeletal: Negative.    Skin: Negative.    Neurological: Negative.    Endo/Heme/Allergies: Negative.         Objective:   BP (!) 98/52 (BP Location: Left arm, Patient Position: Sitting, BP Cuff Size: Adult)   Pulse 90   Ht 1.753 m (5' 9\")   Wt 69.4 kg (153 lb)   LMP  (LMP Unknown)   SpO2 92%   BMI 22.59 kg/m²     Physical Exam   Constitutional: She is oriented to person, place, and time. No distress.   HENT:   Head: Normocephalic and atraumatic.   Eyes: Pupils are equal, round, and reactive to light. No scleral icterus.   Neck: No JVD present.   Cardiovascular: Normal rate and regular rhythm.   No murmur heard.  Pulmonary/Chest: No respiratory distress. She has no wheezes.   Abdominal: Soft. She exhibits no distension. There is no abdominal tenderness.   Musculoskeletal:         General: No edema.   Neurological: She is alert and oriented to person, place, and time.   Skin: Skin is warm.   Psychiatric: She has a normal mood and affect.       Assessment:     1. Coronary artery calcification seen on CAT scan     2. [place] (dyspnea on exertion)     3. Dyslipidemia         Medical Decision Making:  Today's Assessment / Status / Plan:   Coronary calcification by CT: Recent treadmill was reassuring.  She remains asymptomatic.    Hyperlipidemia: LDL is now down to target.  A 30 to 50% reduction from baseline LDL is what is recommended by ACC and she is not currently within that range.  Vision has had some rather nonspecific complaints.  I have asked her to stop the atorvastatin for 10 days and then " resume it.  If her discomfort and symptoms resolve off atorvastatin and then return when she resumes the medication, she will call.  The patient will return on an as-needed basis.

## 2021-05-05 NOTE — LETTER
[facility]   [address]   [address]  [place] [address]  Phone: [phone]  Fax: [phone]              Mirian Jack  1940    Encounter Date: 5/5/2021    Itz Gee M.D.          PROGRESS NOTE:  Chief Complaint   Patient presents with   • Hyperlipidemia       Subjective:   Mirian Jack is a 80 y.o. female who presents today for follow-up of abnormal CT coronary calcification scan exertional dyspnea.  She was seen initially in February because of a history of a coronary CT scan with a score of over 500 which placed in the 75th percentile for age.  Her baseline LDL was 164.  The patient was started on statin at 40 mg atorvastatin daily.  Her LDL is dropped from 164-98 and her non-HDL cholesterol is now 113.  She has had some nonspecific muscle weakness and wonders if it is secondary to statin.  The patient had a treadmill performed last month that revealed no evidence of ischemia and an exercise tolerance of 9 minutes which is adequate.    Past Medical History:   Diagnosis Date   • Cancer (HCC)     basal cell and squamous cell    • Cataract    • Macular degeneration    • Tubular adenoma of colon      Past Surgical History:   Procedure Laterality Date   • LAMINOTOMY  2000    L4-5   • EYE SURGERY      lasik and cataracts   • FOOT SURGERY     • KNEE ARTHROPLASTY TOTAL Right      Family History   Problem Relation Age of Onset   • Heart Disease Mother    • Stroke Father      Social History     Socioeconomic History   • Marital status:      Spouse name: Not on file   • Number of children: Not on file   • Years of education: Not on file   • Highest education level: Not on file   Occupational History   • Not on file   Tobacco Use   • Smoking status: Never Smoker   • Smokeless tobacco: Never Used   Substance and Sexual Activity   • Alcohol use: Yes     Alcohol/week: 6.0 oz     Types: 10 Glasses of wine per week   • Drug use: Never      • Sexual activity: Yes     Partners: Male     Birth control/protection: Post-Menopausal   Other Topics Concern   • Not on file   Social History Narrative   • Not on file     Social Determinants of Health     Financial Resource Strain:    • Difficulty of Paying Living Expenses:    Food Insecurity:    • Worried About Running Out of Food in the Last Year:    • Ran Out of Food in the Last Year:    Transportation Needs:    • Lack of Transportation (Medical):    • Lack of Transportation (Non-Medical):    Physical Activity:    • Days of Exercise per Week:    • Minutes of Exercise per Session:    Stress:    • Feeling of Stress :    Social Connections:    • Frequency of Communication with Friends and Family:    • Frequency of Social Gatherings with Friends and Family:    • Attends Bapt[employer] Services:    • Active Member of Clubs or Organizations:    • Attends Club or Organization Meetings:    • Marital Status:    Intimate Partner Violence:    • Fear of Current or Ex-Partner:    • Emotionally Abused:    • Physically Abused:    • Sexually Abused:      Allergies   Allergen Reactions   • Ibuprofen Unspecified     GI upset and sweating if she takes Advil or Aspirin on a regular basis.     Outpatient Encounter Medications as of 5/5/2021   Medication Sig Dispense Refill   • aspirin EC (ECOTRIN) 81 MG Tablet Delayed Response Take 81 mg by mouth every day.     • amoxicillin (AMOXIL) 500 MG Cap TAKE 2 CAPSULES BY MOUTH 1 HOUR BEFORE APPOINTMENT THEN TAKE 1 CAPSULE BY MOUTH THREE TIMES DAILY UNTIL GONE     • chlorhexidine (PERIDEX) 0.12 % Solution      • atorvastatin (LIPITOR) 80 MG tablet Take 1 Tab by mouth every evening. (Patient taking differently: Take 40 mg by mouth every evening.) 90 Tab 1   • Probiotic Product (SOLUBLE FIBER/PROBIOTICS PO) Take  by mouth.     • ascorbic acid (ASCORBIC ACID) 500 MG Tab Take 500 mg by mouth every day.     • Turmeric 500 MG Tab Take  by mouth.     • Cholecalciferol (VITAMIN D PO) Take 5,000 Units  "by mouth.     • Multiple Vitamin (MULTI-VITAMINS) Tab Take  by mouth.     • b complex vitamins tablet Take  by mouth.     • HYDROcodone-acetaminophen (NORCO) 5-325 MG Tab per tablet TAKE 1 TO 2 TABLETS BY MOUTH EVERY 4 TO 6 HOURS AS NEEDED FOR 2 DAYS     • magnesium gluconate (MAG-G) 500 MG tablet Take 500 mg by mouth 3 times a day.     • calcium carbonate (RA CALCIUM) 500 MG Tab Take  by mouth.     • cyanocobalamin (VITAMIN B12) 1000 MCG Tab Take  by mouth.       No facility-administered encounter medications on file as of 5/5/2021.     Review of Systems   Constitutional: Negative.    HENT: Negative.    Respiratory: Negative.    Cardiovascular: Negative.    Gastrointestinal: Negative.    Musculoskeletal: Negative.    Skin: Negative.    Neurological: Negative.    Endo/Heme/Allergies: Negative.         Objective:   BP (!) 98/52 (BP Location: Left arm, Patient Position: Sitting, BP Cuff Size: Adult)   Pulse 90   Ht 1.753 m (5' 9\")   Wt 69.4 kg (153 lb)   LMP  (LMP Unknown)   SpO2 92%   BMI 22.59 kg/m²     Physical Exam   Constitutional: She is oriented to person, place, and time. No distress.   HENT:   Head: Normocephalic and atraumatic.   Eyes: Pupils are equal, round, and reactive to light. No scleral icterus.   Neck: No JVD present.   Cardiovascular: Normal rate and regular rhythm.   No murmur heard.  Pulmonary/Chest: No respiratory distress. She has no wheezes.   Abdominal: Soft. She exhibits no distension. There is no abdominal tenderness.   Musculoskeletal:         General: No edema.   Neurological: She is alert and oriented to person, place, and time.   Skin: Skin is warm.   Psychiatric: She has a normal mood and affect.       Assessment:     1. Coronary artery calcification seen on CAT scan     2. DAVENPORT (dyspnea on exertion)     3. Dyslipidemia         Medical Decision Making:  Today's Assessment / Status / Plan:   Coronary calcification by CT: Recent treadmill was reassuring.  She remains " asymptomatic.    Hyperlipidemia: LDL is now down to target.  A 30 to 50% reduction from baseline LDL is what is recommended by [facility] and she is not currently within that range.  Vision has had some rather nonspecific complaints.  I have asked her to stop the atorvastatin for 10 days and then resume it.  If her discomfort and symptoms resolve off atorvastatin and then return when she resumes the medication, she will call.  The patient will return on an as-needed basis.            Mari Light M.D.  [address] [address] [address]  Via In Basket

## 2021-05-18 ENCOUNTER — PATIENT MESSAGE (OUTPATIENT)
Dept: CARDIOLOGY | Facility: MEDICAL CENTER | Age: 81
End: 2021-05-18

## 2021-05-18 ENCOUNTER — TELEPHONE (OUTPATIENT)
Dept: CARDIOLOGY | Facility: MEDICAL CENTER | Age: 81
End: 2021-05-18

## 2021-05-18 NOTE — TELEPHONE ENCOUNTER
RS    Patient called and stated she went off the Lipitor for the 10 days. She is feeling better. She would like the new medication sent to pharmacy on file.    Thank you,    Gay GONSALEZ

## 2021-05-18 NOTE — TELEPHONE ENCOUNTER
Per RS:  Patient was to retry the Atorvastatin after she went off.  I left a message for her to call me.

## 2021-05-18 NOTE — TELEPHONE ENCOUNTER
Pt returned call. Pt states she does not want to go back on atorvastatin. Please call Pt back at [phone].    Thank you

## 2021-05-18 NOTE — TELEPHONE ENCOUNTER
Patient states that she does not want to go back on the Atorvastatin.  To Dr. Gee to advise another drug.

## 2021-05-19 DIAGNOSIS — Z79.899 HIGH RISK MEDICATION USE: ICD-10-CM

## 2021-05-19 DIAGNOSIS — E78.5 DYSLIPIDEMIA: ICD-10-CM

## 2021-05-19 DIAGNOSIS — I25.10 CORONARY ARTERY CALCIFICATION SEEN ON CAT SCAN: ICD-10-CM

## 2021-05-19 RX ORDER — ROSUVASTATIN CALCIUM 10 MG/1
TABLET, COATED ORAL
Qty: 36 TABLET | Refills: 3 | Status: SHIPPED | OUTPATIENT
Start: 2021-05-19 | End: 2021-09-13

## 2021-05-19 NOTE — PATIENT COMMUNICATION
Patient was informed and will comply.  She will call back if she has any further symptoms.  Lab and Medication ordered.

## 2021-05-19 NOTE — PATIENT COMMUNICATION
Itz Gee M.D.  You 15 hours ago (5:16 PM)     Lets give another week off statins altogether and then start her on low-dose rosuvastatin at 10 mg on a Monday/Wednesday/Friday dose only.  Repeat lipid profile in 3 months after she starts this    Message text

## 2021-05-24 ENCOUNTER — TELEPHONE (OUTPATIENT)
Dept: MEDICAL GROUP | Facility: LAB | Age: 81
End: 2021-05-24

## 2021-05-24 DIAGNOSIS — H93.8X3 SENSATION OF FULLNESS IN BOTH EARS: ICD-10-CM

## 2021-06-11 ENCOUNTER — APPOINTMENT (OUTPATIENT)
Dept: RADIOLOGY | Facility: MEDICAL CENTER | Age: 81
End: 2021-06-11
Attending: FAMILY MEDICINE
Payer: COMMERCIAL

## 2021-06-24 ENCOUNTER — PATIENT MESSAGE (OUTPATIENT)
Dept: MEDICAL GROUP | Facility: LAB | Age: 81
End: 2021-06-24

## 2021-06-24 DIAGNOSIS — N64.4 BREAST PAIN, LEFT: ICD-10-CM

## 2021-06-24 DIAGNOSIS — Z12.31 ENCOUNTER FOR SCREENING MAMMOGRAM FOR BREAST CANCER: ICD-10-CM

## 2021-07-22 ENCOUNTER — HOSPITAL ENCOUNTER (OUTPATIENT)
Dept: RADIOLOGY | Facility: MEDICAL CENTER | Age: 81
End: 2021-07-22
Attending: FAMILY MEDICINE
Payer: MEDICARE

## 2021-07-22 DIAGNOSIS — Z12.31 ENCOUNTER FOR SCREENING MAMMOGRAM FOR BREAST CANCER: ICD-10-CM

## 2021-07-22 DIAGNOSIS — N64.4 BREAST PAIN, LEFT: ICD-10-CM

## 2021-07-22 PROCEDURE — 76642 ULTRASOUND BREAST LIMITED: CPT | Mod: LT

## 2021-07-22 PROCEDURE — G0279 TOMOSYNTHESIS, MAMMO: HCPCS

## 2021-08-19 ENCOUNTER — HOSPITAL ENCOUNTER (OUTPATIENT)
Dept: LAB | Facility: MEDICAL CENTER | Age: 81
End: 2021-08-19
Attending: INTERNAL MEDICINE
Payer: MEDICARE

## 2021-08-19 DIAGNOSIS — E78.5 DYSLIPIDEMIA: ICD-10-CM

## 2021-08-19 DIAGNOSIS — Z79.899 HIGH RISK MEDICATION USE: ICD-10-CM

## 2021-08-19 DIAGNOSIS — I25.10 CORONARY ARTERY CALCIFICATION SEEN ON CAT SCAN: ICD-10-CM

## 2021-08-19 LAB
ALBUMIN SERPL BCP-MCNC: 4.1 G/DL (ref 3.2–4.9)
ALBUMIN/GLOB SERPL: 1.9 G/DL
ALP SERPL-CCNC: 82 U/L (ref 30–99)
ALT SERPL-CCNC: 16 U/L (ref 2–50)
ANION GAP SERPL CALC-SCNC: 11 MMOL/L (ref 7–16)
AST SERPL-CCNC: 22 U/L (ref 12–45)
BILIRUB SERPL-MCNC: 0.5 MG/DL (ref 0.1–1.5)
BUN SERPL-MCNC: 13 MG/DL (ref 8–22)
CALCIUM SERPL-MCNC: 9.1 MG/DL (ref 8.5–10.5)
CHLORIDE SERPL-SCNC: 104 MMOL/L (ref 96–112)
CHOLEST SERPL-MCNC: 240 MG/DL (ref 100–199)
CO2 SERPL-SCNC: 26 MMOL/L (ref 20–33)
CREAT SERPL-MCNC: 0.73 MG/DL (ref 0.5–1.4)
FASTING STATUS PATIENT QL REPORTED: NORMAL
GLOBULIN SER CALC-MCNC: 2.2 G/DL (ref 1.9–3.5)
GLUCOSE SERPL-MCNC: 87 MG/DL (ref 65–99)
HDLC SERPL-MCNC: 73 MG/DL
LDLC SERPL CALC-MCNC: 152 MG/DL
POTASSIUM SERPL-SCNC: 4.4 MMOL/L (ref 3.6–5.5)
PROT SERPL-MCNC: 6.3 G/DL (ref 6–8.2)
SODIUM SERPL-SCNC: 141 MMOL/L (ref 135–145)
TRIGL SERPL-MCNC: 75 MG/DL (ref 0–149)

## 2021-08-19 PROCEDURE — 80061 LIPID PANEL: CPT

## 2021-08-19 PROCEDURE — 36415 COLL VENOUS BLD VENIPUNCTURE: CPT

## 2021-08-19 PROCEDURE — 80053 COMPREHEN METABOLIC PANEL: CPT

## 2021-08-20 ENCOUNTER — TELEPHONE (OUTPATIENT)
Dept: CARDIOLOGY | Facility: MEDICAL CENTER | Age: 81
End: 2021-08-20

## 2021-08-20 NOTE — TELEPHONE ENCOUNTER
"Spoke with pt. She has not taken Rosuvastatin 10mg (3 times a week) in over a month. She stopped it due to \"terrible weakness\". She has also tried and failed Atorvastatin.   Pt. Is willing to try PCSK9 inhibitor.  LAM Cano.  "

## 2021-08-20 NOTE — TELEPHONE ENCOUNTER
----- Message from ZOFIA Salinas sent at 8/20/2021  9:19 AM PDT -----  LDL not at goal with CAD hx. Was she on atorvastatin when taking this lab? If so, then we need to adjust dosing. If not, then re-start and redo labs in 3 months. SC

## 2021-08-30 ENCOUNTER — OFFICE VISIT (OUTPATIENT)
Dept: CARDIOLOGY | Facility: MEDICAL CENTER | Age: 81
End: 2021-08-30
Payer: MEDICARE

## 2021-08-30 VITALS
OXYGEN SATURATION: 96 % | SYSTOLIC BLOOD PRESSURE: 104 MMHG | RESPIRATION RATE: 16 BRPM | BODY MASS INDEX: 22.99 KG/M2 | DIASTOLIC BLOOD PRESSURE: 58 MMHG | HEART RATE: 86 BPM | WEIGHT: 155.2 LBS | HEIGHT: 69 IN

## 2021-08-30 DIAGNOSIS — E78.5 DYSLIPIDEMIA: ICD-10-CM

## 2021-08-30 DIAGNOSIS — I25.10 CORONARY ARTERY CALCIFICATION SEEN ON CAT SCAN: ICD-10-CM

## 2021-08-30 PROCEDURE — 99214 OFFICE O/P EST MOD 30 MIN: CPT | Performed by: PHYSICIAN ASSISTANT

## 2021-08-30 RX ORDER — EVOLOCUMAB 140 MG/ML
1 INJECTION, SOLUTION SUBCUTANEOUS
Qty: 2 ML | Refills: 11 | Status: SHIPPED | OUTPATIENT
Start: 2021-08-30 | End: 2022-08-08

## 2021-08-30 ASSESSMENT — ENCOUNTER SYMPTOMS
PALPITATIONS: 0
NAUSEA: 0
BLOATING: 0
NEAR-SYNCOPE: 0
VOMITING: 0
SYNCOPE: 0
MYALGIAS: 0
DIZZINESS: 0
ORTHOPNEA: 0
SNORING: 0
WEAKNESS: 0
DECREASED APPETITE: 0
FEVER: 0
SHORTNESS OF BREATH: 0
DIAPHORESIS: 0
ABDOMINAL PAIN: 0
BRUISES/BLEEDS EASILY: 0
BLURRED VISION: 0
DYSPNEA ON EXERTION: 0

## 2021-08-30 ASSESSMENT — FIBROSIS 4 INDEX: FIB4 SCORE: 1.85

## 2021-08-30 NOTE — Clinical Note
I'm sending this nice lady to see you in follow up  She was a pt of Dr. Martinez , intolerant to statins  , + fam hx of CAD, , I started her on Repatha with recheck lipid panel before she sees you in November.

## 2021-08-30 NOTE — PROGRESS NOTES
Cardiology Clinic Follow-up Note    Date of note:    8/30/2021  Primary Care Provider: Mari Light M.D.    Name:             Mirian Jack  YOB: 1940  MRN:               [id number]    CC: coronary calcium, Dyslipidemia     Primary Cardiologist: Dr. Gee    Patient HPI:   Mirian Jack is a 80 y.o. female with PMH including coronary calcium by CT (mainly in the LAD territory), Dyslipidemia, macular degeneration.      Interim History:  Ms. Jack was last seen in this cardiology office by Dr. Gee in 5/2021.      She has been in tolerant to statins.   She tried atorvastatin in the past with significant myalgias and weakness.   She was then tried on low dose rosuvastatin 3 times per week.  This also resulted in myalgias and weakness.      Her repeat LDL was 152.    Remains very active, works still as a PT in town.   Likes to ski, hike when the weather allows.     Review of Systems   Constitutional: Negative for decreased appetite, diaphoresis, fever and malaise/fatigue.   Eyes: Negative for blurred vision.   Cardiovascular: Negative for chest pain, dyspnea on exertion, leg swelling, near-syncope, orthopnea, palpitations and syncope.   Respiratory: Negative for shortness of breath and snoring.    Hematologic/Lymphatic: Negative for bleeding problem. Does not bruise/bleed easily.   Skin: Negative for rash.   Musculoskeletal: Negative for joint pain and myalgias.   Gastrointestinal: Negative for bloating, abdominal pain, nausea and vomiting.   Genitourinary: Negative for frequency.   Neurological: Negative for dizziness and weakness.        Past medical history, social history and family history reviewed.  No changes other than what is outlined in HPI.    Current Outpatient Medications   Medication Sig Dispense Refill   • Evolocumab, REPATHA, (REPATHA SURECLICK) 140 MG/ML Solution Auto-injector Inject 1 Each under the skin every 14 days. 2 mL 11   • rosuvastatin (CRESTOR) 10 MG Tab Take one tablet on  "Monday Wed Friday 36 tablet 3   • aspirin EC (ECOTRIN) 81 MG Tablet Delayed Response Take 81 mg by mouth every day.     • amoxicillin (AMOXIL) 500 MG Cap TAKE 2 CAPSULES BY MOUTH 1 HOUR BEFORE APPOINTMENT THEN TAKE 1 CAPSULE BY MOUTH THREE TIMES DAILY UNTIL GONE     • chlorhexidine (PERIDEX) 0.12 % Solution      • magnesium gluconate (MAG-G) 500 MG tablet Take 500 mg by mouth 3 times a day.     • Probiotic Product (SOLUBLE FIBER/PROBIOTICS PO) Take  by mouth.     • ascorbic acid (ASCORBIC ACID) 500 MG Tab Take 500 mg by mouth every day.     • Turmeric 500 MG Tab Take  by mouth.     • calcium carbonate (RA CALCIUM) 500 MG Tab Take  by mouth.     • cyanocobalamin (VITAMIN B12) 1000 MCG Tab Take  by mouth.     • Cholecalciferol (VITAMIN D PO) Take 5,000 Units by mouth.     • Multiple Vitamin (MULTI-VITAMINS) Tab Take  by mouth.     • b complex vitamins tablet Take  by mouth.       No current facility-administered medications for this visit.       Allergies   Allergen Reactions   • Ibuprofen Unspecified     GI upset and sweating if she takes Advil or Aspirin on a regular basis.         Physical Exam:  Ambulatory Vitals  /58 (BP Location: Left arm, Patient Position: Sitting, BP Cuff Size: Adult)   Pulse 86   Resp 16   Ht 1.753 m (5' 9\")   Wt 70.4 kg (155 lb 3.2 oz)   SpO2 96%    BP Readings from Last 4 Encounters:   08/30/21 104/58   05/05/21 (!) 98/52   03/01/21 128/74   02/23/21 108/76       Weight/BMI: Body mass index is 22.92 kg/m².  Wt Readings from Last 4 Encounters:   08/30/21 70.4 kg (155 lb 3.2 oz)   05/05/21 69.4 kg (153 lb)   03/01/21 70.8 kg (156 lb)   02/23/21 70.8 kg (156 lb)       General: no apparent distress  Lungs: normal effort, without crackles, no wheezing or rhonchi.  Heart: normal rate, regular rhythm, no murmur, no rub  Ext:  no lower extremity edema.  Neurological: No focal deficits, no facial asymmetry.  Normal speech.  Psychiatric: Appropriate affect, alert and oriented x 3.   Skin: " Warm extremities, no rash.      Lab Data Review:  Lab Results   Component Value Date/Time    CHOLSTRLTOT 240 (H) 2021 06:19 AM     (H) 2021 06:19 AM    HDL 73 2021 06:19 AM    TRIGLYCERIDE 75 2021 06:19 AM       Lab Results   Component Value Date/Time    SODIUM 141 2021 06:19 AM    POTASSIUM 4.4 2021 06:19 AM    CHLORIDE 104 2021 06:19 AM    CO2 26 2021 06:19 AM    GLUCOSE 87 2021 06:19 AM    BUN 13 2021 06:19 AM    CREATININE 0.73 2021 06:19 AM     Lab Results   Component Value Date/Time    ALKPHOSPHAT 82 2021 06:19 AM    ASTSGOT 22 2021 06:19 AM    ALTSGPT 16 2021 06:19 AM    TBILIRUBIN 0.5 2021 06:19 AM      Lab Results   Component Value Date/Time    WBC 5.6 2019 08:59 AM         Cardiac Imaging and Procedures Review:      CAD 2020:  Coronary calcification:  LMA - 10.5  LCX - 0.0  LAD - 493.5  RCA - 0.0  PDA - 0.0     Total Calcium Score: 504    Echo 3/17/21:  CONCLUSIONS  Normal left ventricular systolic function. Left ventricular ejection   fraction is visually estimated to be 60%.   Grade I diastolic dysfunction.  The right ventricle was normal in size and function.  Mild tricuspid regurgitation. Right ventricular systolic pressure is   estimated to be 43 mmHg.   No prior study is available for comparison.    Exercise treadmill Test 3/2/2021:  no evidence of ischemia and an exercise tolerance of 9 minutes which is adequate        Assessment and Clinical Decision Makin  Coronary calcium on CT  -   No ischemia suggested by recent treadmill test    2  Dyslipidemia   -    2021  -   We had a lengthy discussion in regards to her options.   -   Statins were not tolerated.   -   Could could change her diet and try Zetia or try Repatha.   -   She values her ability to enjoy food and drinks and would like to try Repatha.  -   Repeat lipid panel in 6 weeks after starting Repatha, then follow up in  office.    Future Appointments   Date Time Provider Department Center   9/1/2021 10:30 AM Lm Leonard M.D. [facility]   11/1/2021  3:15 PM Carina Khoury P.A.-C. [facility] None         Glo Emmanuel PA-C     [facility]

## 2021-09-01 PROBLEM — Z96.659 TOTAL KNEE REPLACEMENT STATUS: Status: ACTIVE | Noted: 2021-09-01

## 2021-09-05 ENCOUNTER — PATIENT MESSAGE (OUTPATIENT)
Dept: CARDIOLOGY | Facility: MEDICAL CENTER | Age: 81
End: 2021-09-05

## 2021-09-07 NOTE — PATIENT COMMUNICATION
Clarification to follow up on Repatha status relayed to [employer] responsible for prior authorizations.

## 2021-09-08 ENCOUNTER — TELEPHONE (OUTPATIENT)
Dept: CARDIOLOGY | Facility: MEDICAL CENTER | Age: 81
End: 2021-09-08

## 2021-09-08 NOTE — TELEPHONE ENCOUNTER
LYNDON COTTO (Espino: [id number])  Repatha SureClick 140MG/ML auto-injectors     Form  [employer] Commercial Electronic PA Form (2017 NCPDP)  Created  2 minutes ago  Sent to Plan  less than a minute ago  Plan Response  less than a minute ago  Submit Clinical Questions  Determination  N/A  Message from Plan  Drug is covered by current benefit plan. No further PA activity needed

## 2021-09-08 NOTE — TELEPHONE ENCOUNTER
Regarding: Injectable drug for high cholesterol  ----- Message from Jodie Chris R.N. sent at 9/7/2021  3:34 PM PDT -----  Praveen Leon!    When you get a moment, can you follow up to see if we received any update regarding Repatha for pt?  AH ordered it on 8/31.    Thank you!     ----- Message from Mirian Jack to Glo Emmanuel P.A.-C. sent at 9/5/2021  3:45 PM -----   I have not heard anything about whether Medicare approved the injectable drug for high Cholesterol .  I would like to know if it was approved.  I will have knee replacement surgery for a dysfunction old knee orthotic on Sept. 14th. If this was approved,  I wondered if I should wait to start this new injectable drug untl after surgery.    Thank you.  Mirian Jack

## 2021-09-12 NOTE — H&P
Surgery Orthopedic History & Physical Note    Date  9/12/2021    Primary Care Physician  Mari Light M.D.      Pre-Op Diagnosis Codes:     * Presence of right artificial knee joint [Z96.651]    HPI  This is a 80 y.o. female who presented with right knee pain,  Patient is 15 years status post total knee arthroplasty now with popping squeaking in the knee.  She is also noticed that her knee has drifted into valgus malalignment and there is a lot of swelling and pain in the knee as well.  The knee feels unstable.  All this started a couple of months ago.  She continues to be very active and has been playing tennis.  Pain Assessment  Pain Assessment: 0-10  Pain Score: 5 - Moderate pain    Past Medical History:   Diagnosis Date   • Cancer (HCC)     basal cell and squamous cell    • Cataract    • Macular degeneration    • Tubular adenoma of colon        Past Surgical History:   Procedure Laterality Date   • LAMINOTOMY  2000    L4-5   • EYE SURGERY      lasik and cataracts   • FOOT SURGERY     • KNEE ARTHROPLASTY TOTAL Right        No current facility-administered medications for this encounter.     Current Outpatient Medications   Medication Sig Dispense Refill   • Evolocumab, REPATHA, (REPATHA SURECLICK) 140 MG/ML Solution Auto-injector Inject 1 Each under the skin every 14 days. 2 mL 11   • rosuvastatin (CRESTOR) 10 MG Tab Take one tablet on Monday Wed Friday 36 tablet 3   • aspirin EC (ECOTRIN) 81 MG Tablet Delayed Response Take 81 mg by mouth every day.     • amoxicillin (AMOXIL) 500 MG Cap TAKE 2 CAPSULES BY MOUTH 1 HOUR BEFORE APPOINTMENT THEN TAKE 1 CAPSULE BY MOUTH THREE TIMES DAILY UNTIL GONE     • chlorhexidine (PERIDEX) 0.12 % Solution      • magnesium gluconate (MAG-G) 500 MG tablet Take 500 mg by mouth 3 times a day.     • Probiotic Product (SOLUBLE FIBER/PROBIOTICS PO) Take  by mouth.     • ascorbic acid (ASCORBIC ACID) 500 MG Tab Take 500 mg by mouth every day.     • Turmeric 500 MG Tab Take  by mouth.      • calcium carbonate (RA CALCIUM) 500 MG Tab Take  by mouth.     • cyanocobalamin (VITAMIN B12) 1000 MCG Tab Take  by mouth.     • Cholecalciferol (VITAMIN D PO) Take 5,000 Units by mouth.     • Multiple Vitamin (MULTI-VITAMINS) Tab Take  by mouth.     • b complex vitamins tablet Take  by mouth.         Social History     Occupational History   • Not on file   Tobacco Use   • Smoking status: Never Smoker   • Smokeless tobacco: Never Used   Vaping Use   • Vaping Use: Never used   Substance and Sexual Activity   • Alcohol use: Yes     Alcohol/week: 6.0 oz     Types: 10 Glasses of wine per week   • Drug use: Never   • Sexual activity: Yes     Partners: Male     Birth control/protection: Post-Menopausal       Family History   Problem Relation Age of Onset   • Heart Disease Mother    • Stroke Father        Allergies  Ibuprofen    Review of Systems  Pertinent ROS in HPI. Other ROS reviewed and found to be otherwise unremarkable.     Physical Exam  Patient walks antalgic gait favoring the right side  Severe joint effusion right knee  Valgus deformity of the knee with standing  Audible squeaking in a deep bend.  Neurovascular intact    Radiology:  Wear of the metal baseplate with extrusion of the polyethylene liner    Assessment/Plan:  Pre-Op Diagnosis Codes:     * Presence of right artificial knee joint [Z96.651]     * Failed right total knee arthroplasty    Procedure(s):  REVISION, TOTAL ARTHROPLASTY, KNEE, ALL COMPONENTS    Patient has had a complete wear of the polyethylene and is now wearing through the metal.  The knee is fallen into valgus and it appears that the baseplate is extruded.  There are no conservative treatments for this and she was advised she needs a revision of the components soon as possible.  Risk benefits surgery were discussed the patient.    Patient has been educated about choice for planned prosthesis and rationale for its use.  Patient understands and wishes to proceed.

## 2021-09-14 ENCOUNTER — APPOINTMENT (OUTPATIENT)
Dept: RADIOLOGY | Facility: MEDICAL CENTER | Age: 81
End: 2021-09-14
Attending: STUDENT IN AN ORGANIZED HEALTH CARE EDUCATION/TRAINING PROGRAM
Payer: MEDICARE

## 2021-09-14 ENCOUNTER — ANESTHESIA EVENT (OUTPATIENT)
Dept: SURGERY | Facility: MEDICAL CENTER | Age: 81
End: 2021-09-14
Payer: MEDICARE

## 2021-09-14 ENCOUNTER — HOSPITAL ENCOUNTER (OUTPATIENT)
Facility: MEDICAL CENTER | Age: 81
End: 2021-09-15
Attending: ORTHOPAEDIC SURGERY | Admitting: ORTHOPAEDIC SURGERY
Payer: MEDICARE

## 2021-09-14 ENCOUNTER — ANESTHESIA (OUTPATIENT)
Dept: SURGERY | Facility: MEDICAL CENTER | Age: 81
End: 2021-09-14
Payer: MEDICARE

## 2021-09-14 DIAGNOSIS — Z96.651 STATUS POST TOTAL RIGHT KNEE REPLACEMENT: Primary | ICD-10-CM

## 2021-09-14 LAB
EKG IMPRESSION: NORMAL
ERYTHROCYTE [DISTWIDTH] IN BLOOD BY AUTOMATED COUNT: 46.7 FL (ref 35.9–50)
EXTERNAL QUALITY CONTROL: NORMAL
HCT VFR BLD AUTO: 41.4 % (ref 37–47)
HGB BLD-MCNC: 14 G/DL (ref 12–16)
INR PPP: 0.99 (ref 0.87–1.13)
MCH RBC QN AUTO: 34.6 PG (ref 27–33)
MCHC RBC AUTO-ENTMCNC: 33.8 G/DL (ref 33.6–35)
MCV RBC AUTO: 102.2 FL (ref 81.4–97.8)
PLATELET # BLD AUTO: 260 K/UL (ref 164–446)
PMV BLD AUTO: 8.4 FL (ref 9–12.9)
PROTHROMBIN TIME: 12.3 SEC (ref 12–14.6)
RBC # BLD AUTO: 4.05 M/UL (ref 4.2–5.4)
SARS-COV+SARS-COV-2 AG RESP QL IA.RAPID: NEGATIVE
WBC # BLD AUTO: 5.9 K/UL (ref 4.8–10.8)

## 2021-09-14 PROCEDURE — C1776 JOINT DEVICE (IMPLANTABLE): HCPCS | Performed by: ORTHOPAEDIC SURGERY

## 2021-09-14 PROCEDURE — 96375 TX/PRO/DX INJ NEW DRUG ADDON: CPT | Mod: XU

## 2021-09-14 PROCEDURE — 85027 COMPLETE CBC AUTOMATED: CPT

## 2021-09-14 PROCEDURE — 700111 HCHG RX REV CODE 636 W/ 250 OVERRIDE (IP): Performed by: ORTHOPAEDIC SURGERY

## 2021-09-14 PROCEDURE — 85610 PROTHROMBIN TIME: CPT

## 2021-09-14 PROCEDURE — 700111 HCHG RX REV CODE 636 W/ 250 OVERRIDE (IP): Performed by: ANESTHESIOLOGY

## 2021-09-14 PROCEDURE — 160042 HCHG SURGERY MINUTES - EA ADDL 1 MIN LEVEL 5: Performed by: ORTHOPAEDIC SURGERY

## 2021-09-14 PROCEDURE — 160036 HCHG PACU - EA ADDL 30 MINS PHASE I: Performed by: ORTHOPAEDIC SURGERY

## 2021-09-14 PROCEDURE — A9270 NON-COVERED ITEM OR SERVICE: HCPCS | Performed by: STUDENT IN AN ORGANIZED HEALTH CARE EDUCATION/TRAINING PROGRAM

## 2021-09-14 PROCEDURE — 502000 HCHG MISC OR IMPLANTS RC 0278: Performed by: ORTHOPAEDIC SURGERY

## 2021-09-14 PROCEDURE — 501838 HCHG SUTURE GENERAL: Performed by: ORTHOPAEDIC SURGERY

## 2021-09-14 PROCEDURE — 27487 REVISE/REPLACE KNEE JOINT: CPT | Mod: ASROC,RT | Performed by: PHYSICIAN ASSISTANT

## 2021-09-14 PROCEDURE — 94760 N-INVAS EAR/PLS OXIMETRY 1: CPT

## 2021-09-14 PROCEDURE — 160009 HCHG ANES TIME/MIN: Performed by: ORTHOPAEDIC SURGERY

## 2021-09-14 PROCEDURE — 700111 HCHG RX REV CODE 636 W/ 250 OVERRIDE (IP): Performed by: STUDENT IN AN ORGANIZED HEALTH CARE EDUCATION/TRAINING PROGRAM

## 2021-09-14 PROCEDURE — 502579 HCHG PACK, TOTAL KNEE: Performed by: ORTHOPAEDIC SURGERY

## 2021-09-14 PROCEDURE — 73560 X-RAY EXAM OF KNEE 1 OR 2: CPT | Mod: RT

## 2021-09-14 PROCEDURE — G0378 HOSPITAL OBSERVATION PER HR: HCPCS

## 2021-09-14 PROCEDURE — 700105 HCHG RX REV CODE 258: Performed by: ORTHOPAEDIC SURGERY

## 2021-09-14 PROCEDURE — 160031 HCHG SURGERY MINUTES - 1ST 30 MINS LEVEL 5: Performed by: ORTHOPAEDIC SURGERY

## 2021-09-14 PROCEDURE — 64447 NJX AA&/STRD FEMORAL NRV IMG: CPT | Performed by: ORTHOPAEDIC SURGERY

## 2021-09-14 PROCEDURE — 93010 ELECTROCARDIOGRAM REPORT: CPT | Performed by: INTERNAL MEDICINE

## 2021-09-14 PROCEDURE — 96365 THER/PROPH/DIAG IV INF INIT: CPT | Mod: XU

## 2021-09-14 PROCEDURE — 87426 SARSCOV CORONAVIRUS AG IA: CPT | Performed by: ORTHOPAEDIC SURGERY

## 2021-09-14 PROCEDURE — 160048 HCHG OR STATISTICAL LEVEL 1-5: Performed by: ORTHOPAEDIC SURGERY

## 2021-09-14 PROCEDURE — 160035 HCHG PACU - 1ST 60 MINS PHASE I: Performed by: ORTHOPAEDIC SURGERY

## 2021-09-14 PROCEDURE — 93005 ELECTROCARDIOGRAM TRACING: CPT | Performed by: ORTHOPAEDIC SURGERY

## 2021-09-14 PROCEDURE — 160002 HCHG RECOVERY MINUTES (STAT): Performed by: ORTHOPAEDIC SURGERY

## 2021-09-14 PROCEDURE — 700102 HCHG RX REV CODE 250 W/ 637 OVERRIDE(OP): Performed by: STUDENT IN AN ORGANIZED HEALTH CARE EDUCATION/TRAINING PROGRAM

## 2021-09-14 PROCEDURE — C1713 ANCHOR/SCREW BN/BN,TIS/BN: HCPCS | Performed by: ORTHOPAEDIC SURGERY

## 2021-09-14 PROCEDURE — 27487 REVISE/REPLACE KNEE JOINT: CPT | Performed by: ORTHOPAEDIC SURGERY

## 2021-09-14 PROCEDURE — 700101 HCHG RX REV CODE 250: Performed by: ORTHOPAEDIC SURGERY

## 2021-09-14 PROCEDURE — 700101 HCHG RX REV CODE 250

## 2021-09-14 PROCEDURE — 700105 HCHG RX REV CODE 258: Performed by: ANESTHESIOLOGY

## 2021-09-14 PROCEDURE — 700101 HCHG RX REV CODE 250: Performed by: ANESTHESIOLOGY

## 2021-09-14 DEVICE — IMPLANTABLE DEVICE: Type: IMPLANTABLE DEVICE | Site: KNEE | Status: FUNCTIONAL

## 2021-09-14 DEVICE — COUPLER OFFSET LEGION 4MM: Type: IMPLANTABLE DEVICE | Site: KNEE | Status: FUNCTIONAL

## 2021-09-14 DEVICE — STEM PRESS FIT LEGION 18MM X 160MM: Type: IMPLANTABLE DEVICE | Site: KNEE | Status: FUNCTIONAL

## 2021-09-14 DEVICE — COUPLER OFFSET LEGION 2MM (1EA): Type: IMPLANTABLE DEVICE | Site: KNEE | Status: FUNCTIONAL

## 2021-09-14 RX ORDER — ONDANSETRON 4 MG/1
4 TABLET, ORALLY DISINTEGRATING ORAL EVERY 6 HOURS PRN
Qty: 10 TABLET | Refills: 0 | Status: SHIPPED | OUTPATIENT
Start: 2021-09-14 | End: 2021-09-15 | Stop reason: SDUPTHER

## 2021-09-14 RX ORDER — HYDROMORPHONE HYDROCHLORIDE 2 MG/ML
INJECTION, SOLUTION INTRAMUSCULAR; INTRAVENOUS; SUBCUTANEOUS PRN
Status: DISCONTINUED | OUTPATIENT
Start: 2021-09-14 | End: 2021-09-14 | Stop reason: SURG

## 2021-09-14 RX ORDER — BUPIVACAINE HYDROCHLORIDE AND EPINEPHRINE 2.5; 5 MG/ML; UG/ML
INJECTION, SOLUTION EPIDURAL; INFILTRATION; INTRACAUDAL; PERINEURAL
Status: DISCONTINUED | OUTPATIENT
Start: 2021-09-14 | End: 2021-09-14 | Stop reason: HOSPADM

## 2021-09-14 RX ORDER — HALOPERIDOL 5 MG/ML
1 INJECTION INTRAMUSCULAR
Status: DISCONTINUED | OUTPATIENT
Start: 2021-09-14 | End: 2021-09-14 | Stop reason: HOSPADM

## 2021-09-14 RX ORDER — BISACODYL 10 MG
10 SUPPOSITORY, RECTAL RECTAL
Status: DISCONTINUED | OUTPATIENT
Start: 2021-09-14 | End: 2021-09-15 | Stop reason: HOSPADM

## 2021-09-14 RX ORDER — DEXAMETHASONE SODIUM PHOSPHATE 4 MG/ML
INJECTION, SOLUTION INTRA-ARTICULAR; INTRALESIONAL; INTRAMUSCULAR; INTRAVENOUS; SOFT TISSUE PRN
Status: DISCONTINUED | OUTPATIENT
Start: 2021-09-14 | End: 2021-09-14 | Stop reason: SURG

## 2021-09-14 RX ORDER — ASPIRIN 81 MG/1
81 TABLET ORAL
Qty: 56 TABLET | Refills: 0 | Status: SHIPPED | OUTPATIENT
Start: 2021-09-14 | End: 2021-09-15 | Stop reason: SDUPTHER

## 2021-09-14 RX ORDER — ENEMA 19; 7 G/133ML; G/133ML
1 ENEMA RECTAL
Status: DISCONTINUED | OUTPATIENT
Start: 2021-09-14 | End: 2021-09-15 | Stop reason: HOSPADM

## 2021-09-14 RX ORDER — OXYCODONE HYDROCHLORIDE 5 MG/1
2.5 TABLET ORAL
Status: DISCONTINUED | OUTPATIENT
Start: 2021-09-14 | End: 2021-09-15 | Stop reason: HOSPADM

## 2021-09-14 RX ORDER — CELECOXIB 100 MG/1
100 CAPSULE ORAL 2 TIMES DAILY PRN
Qty: 60 CAPSULE | Refills: 1 | Status: SHIPPED | OUTPATIENT
Start: 2021-09-14 | End: 2021-09-15

## 2021-09-14 RX ORDER — SODIUM CHLORIDE, SODIUM LACTATE, POTASSIUM CHLORIDE, CALCIUM CHLORIDE 600; 310; 30; 20 MG/100ML; MG/100ML; MG/100ML; MG/100ML
INJECTION, SOLUTION INTRAVENOUS CONTINUOUS
Status: ACTIVE | OUTPATIENT
Start: 2021-09-14 | End: 2021-09-14

## 2021-09-14 RX ORDER — SODIUM CHLORIDE, SODIUM LACTATE, POTASSIUM CHLORIDE, CALCIUM CHLORIDE 600; 310; 30; 20 MG/100ML; MG/100ML; MG/100ML; MG/100ML
INJECTION, SOLUTION INTRAVENOUS
Status: DISCONTINUED | OUTPATIENT
Start: 2021-09-14 | End: 2021-09-14 | Stop reason: SURG

## 2021-09-14 RX ORDER — SODIUM CHLORIDE, SODIUM LACTATE, POTASSIUM CHLORIDE, CALCIUM CHLORIDE 600; 310; 30; 20 MG/100ML; MG/100ML; MG/100ML; MG/100ML
INJECTION, SOLUTION INTRAVENOUS CONTINUOUS
Status: DISCONTINUED | OUTPATIENT
Start: 2021-09-14 | End: 2021-09-14 | Stop reason: HOSPADM

## 2021-09-14 RX ORDER — AMOXICILLIN 250 MG
1 CAPSULE ORAL NIGHTLY
Status: DISCONTINUED | OUTPATIENT
Start: 2021-09-14 | End: 2021-09-15 | Stop reason: HOSPADM

## 2021-09-14 RX ORDER — CELECOXIB 200 MG/1
200 CAPSULE ORAL DAILY
Status: DISCONTINUED | OUTPATIENT
Start: 2021-09-14 | End: 2021-09-15 | Stop reason: HOSPADM

## 2021-09-14 RX ORDER — ONDANSETRON 2 MG/ML
4 INJECTION INTRAMUSCULAR; INTRAVENOUS
Status: DISCONTINUED | OUTPATIENT
Start: 2021-09-14 | End: 2021-09-14 | Stop reason: HOSPADM

## 2021-09-14 RX ORDER — AMOXICILLIN 250 MG
1 CAPSULE ORAL
Status: DISCONTINUED | OUTPATIENT
Start: 2021-09-14 | End: 2021-09-15 | Stop reason: HOSPADM

## 2021-09-14 RX ORDER — HYDROMORPHONE HYDROCHLORIDE 1 MG/ML
0.4 INJECTION, SOLUTION INTRAMUSCULAR; INTRAVENOUS; SUBCUTANEOUS
Status: DISCONTINUED | OUTPATIENT
Start: 2021-09-14 | End: 2021-09-14 | Stop reason: HOSPADM

## 2021-09-14 RX ORDER — ACETAMINOPHEN 500 MG
1000 TABLET ORAL EVERY 6 HOURS
Status: DISCONTINUED | OUTPATIENT
Start: 2021-09-14 | End: 2021-09-15 | Stop reason: HOSPADM

## 2021-09-14 RX ORDER — OXYCODONE HYDROCHLORIDE 5 MG/1
5 TABLET ORAL EVERY 4 HOURS PRN
Qty: 45 TABLET | Refills: 0 | Status: SHIPPED | OUTPATIENT
Start: 2021-09-14 | End: 2021-09-15 | Stop reason: SDUPTHER

## 2021-09-14 RX ORDER — CEFAZOLIN SODIUM IN 0.9 % NACL 2 G/100 ML
2 PLASTIC BAG, INJECTION (ML) INTRAVENOUS ONCE
Status: COMPLETED | OUTPATIENT
Start: 2021-09-14 | End: 2021-09-14

## 2021-09-14 RX ORDER — DOCUSATE SODIUM 100 MG/1
100 CAPSULE, LIQUID FILLED ORAL 2 TIMES DAILY
Status: DISCONTINUED | OUTPATIENT
Start: 2021-09-14 | End: 2021-09-15 | Stop reason: HOSPADM

## 2021-09-14 RX ORDER — POLYETHYLENE GLYCOL 3350 17 G/17G
1 POWDER, FOR SOLUTION ORAL 2 TIMES DAILY PRN
Status: DISCONTINUED | OUTPATIENT
Start: 2021-09-14 | End: 2021-09-15 | Stop reason: HOSPADM

## 2021-09-14 RX ORDER — ONDANSETRON 2 MG/ML
INJECTION INTRAMUSCULAR; INTRAVENOUS PRN
Status: DISCONTINUED | OUTPATIENT
Start: 2021-09-14 | End: 2021-09-14 | Stop reason: SURG

## 2021-09-14 RX ORDER — OXYCODONE HYDROCHLORIDE 5 MG/1
5 TABLET ORAL
Status: DISCONTINUED | OUTPATIENT
Start: 2021-09-14 | End: 2021-09-15 | Stop reason: HOSPADM

## 2021-09-14 RX ORDER — DIPHENHYDRAMINE HYDROCHLORIDE 50 MG/ML
25 INJECTION INTRAMUSCULAR; INTRAVENOUS EVERY 6 HOURS PRN
Status: DISCONTINUED | OUTPATIENT
Start: 2021-09-14 | End: 2021-09-15 | Stop reason: HOSPADM

## 2021-09-14 RX ORDER — TRANEXAMIC ACID 100 MG/ML
INJECTION, SOLUTION INTRAVENOUS PRN
Status: DISCONTINUED | OUTPATIENT
Start: 2021-09-14 | End: 2021-09-14 | Stop reason: SURG

## 2021-09-14 RX ORDER — POLYETHYLENE GLYCOL 3350 17 G/17G
17 POWDER, FOR SOLUTION ORAL 2 TIMES DAILY PRN
Qty: 20 EACH | Refills: 2 | Status: SHIPPED | OUTPATIENT
Start: 2021-09-14 | End: 2021-09-15

## 2021-09-14 RX ORDER — OXYCODONE HCL 5 MG/5 ML
5 SOLUTION, ORAL ORAL
Status: DISCONTINUED | OUTPATIENT
Start: 2021-09-14 | End: 2021-09-14 | Stop reason: HOSPADM

## 2021-09-14 RX ORDER — HYDROMORPHONE HYDROCHLORIDE 1 MG/ML
0.25 INJECTION, SOLUTION INTRAMUSCULAR; INTRAVENOUS; SUBCUTANEOUS
Status: DISCONTINUED | OUTPATIENT
Start: 2021-09-14 | End: 2021-09-15 | Stop reason: HOSPADM

## 2021-09-14 RX ORDER — HYDROMORPHONE HYDROCHLORIDE 1 MG/ML
0.2 INJECTION, SOLUTION INTRAMUSCULAR; INTRAVENOUS; SUBCUTANEOUS
Status: DISCONTINUED | OUTPATIENT
Start: 2021-09-14 | End: 2021-09-14 | Stop reason: HOSPADM

## 2021-09-14 RX ORDER — LIDOCAINE HYDROCHLORIDE 10 MG/ML
INJECTION, SOLUTION INFILTRATION; PERINEURAL
Status: COMPLETED
Start: 2021-09-14 | End: 2021-09-14

## 2021-09-14 RX ORDER — KETOROLAC TROMETHAMINE 30 MG/ML
INJECTION, SOLUTION INTRAMUSCULAR; INTRAVENOUS
Status: DISCONTINUED | OUTPATIENT
Start: 2021-09-14 | End: 2021-09-14 | Stop reason: HOSPADM

## 2021-09-14 RX ORDER — DOCUSATE SODIUM 100 MG/1
100 CAPSULE, LIQUID FILLED ORAL 2 TIMES DAILY PRN
Qty: 30 CAPSULE | Refills: 2 | Status: SHIPPED | OUTPATIENT
Start: 2021-09-14 | End: 2021-09-15

## 2021-09-14 RX ORDER — SCOLOPAMINE TRANSDERMAL SYSTEM 1 MG/1
1 PATCH, EXTENDED RELEASE TRANSDERMAL
Status: DISCONTINUED | OUTPATIENT
Start: 2021-09-14 | End: 2021-09-15 | Stop reason: HOSPADM

## 2021-09-14 RX ORDER — HALOPERIDOL 5 MG/ML
1 INJECTION INTRAMUSCULAR EVERY 6 HOURS PRN
Status: DISCONTINUED | OUTPATIENT
Start: 2021-09-14 | End: 2021-09-15 | Stop reason: HOSPADM

## 2021-09-14 RX ORDER — CELECOXIB 200 MG/1
200 CAPSULE ORAL
Status: DISCONTINUED | OUTPATIENT
Start: 2021-09-19 | End: 2021-09-15 | Stop reason: HOSPADM

## 2021-09-14 RX ORDER — DEXAMETHASONE SODIUM PHOSPHATE 4 MG/ML
10 INJECTION, SOLUTION INTRA-ARTICULAR; INTRALESIONAL; INTRAMUSCULAR; INTRAVENOUS; SOFT TISSUE ONCE
Status: COMPLETED | OUTPATIENT
Start: 2021-09-15 | End: 2021-09-15

## 2021-09-14 RX ORDER — MEPERIDINE HYDROCHLORIDE 25 MG/ML
12.5 INJECTION INTRAMUSCULAR; INTRAVENOUS; SUBCUTANEOUS
Status: DISCONTINUED | OUTPATIENT
Start: 2021-09-14 | End: 2021-09-14 | Stop reason: HOSPADM

## 2021-09-14 RX ORDER — ONDANSETRON 2 MG/ML
4 INJECTION INTRAMUSCULAR; INTRAVENOUS EVERY 4 HOURS PRN
Status: DISCONTINUED | OUTPATIENT
Start: 2021-09-14 | End: 2021-09-15 | Stop reason: HOSPADM

## 2021-09-14 RX ORDER — HYDROMORPHONE HYDROCHLORIDE 1 MG/ML
0.1 INJECTION, SOLUTION INTRAMUSCULAR; INTRAVENOUS; SUBCUTANEOUS
Status: DISCONTINUED | OUTPATIENT
Start: 2021-09-14 | End: 2021-09-14 | Stop reason: HOSPADM

## 2021-09-14 RX ORDER — OXYCODONE HCL 5 MG/5 ML
10 SOLUTION, ORAL ORAL
Status: DISCONTINUED | OUTPATIENT
Start: 2021-09-14 | End: 2021-09-14 | Stop reason: HOSPADM

## 2021-09-14 RX ORDER — ACETAMINOPHEN 500 MG
1000 TABLET ORAL EVERY 6 HOURS PRN
Status: DISCONTINUED | OUTPATIENT
Start: 2021-09-19 | End: 2021-09-15 | Stop reason: HOSPADM

## 2021-09-14 RX ORDER — ACETAMINOPHEN 500 MG
500-1000 TABLET ORAL EVERY 6 HOURS PRN
Qty: 30 TABLET | Refills: 2 | Status: SHIPPED | OUTPATIENT
Start: 2021-09-14 | End: 2021-09-15

## 2021-09-14 RX ORDER — DEXAMETHASONE SODIUM PHOSPHATE 4 MG/ML
4 INJECTION, SOLUTION INTRA-ARTICULAR; INTRALESIONAL; INTRAMUSCULAR; INTRAVENOUS; SOFT TISSUE
Status: DISCONTINUED | OUTPATIENT
Start: 2021-09-14 | End: 2021-09-15 | Stop reason: HOSPADM

## 2021-09-14 RX ORDER — BUPIVACAINE HYDROCHLORIDE 2.5 MG/ML
INJECTION, SOLUTION EPIDURAL; INFILTRATION; INTRACAUDAL
Status: COMPLETED | OUTPATIENT
Start: 2021-09-14 | End: 2021-09-14

## 2021-09-14 RX ORDER — LIDOCAINE HYDROCHLORIDE 20 MG/ML
INJECTION, SOLUTION EPIDURAL; INFILTRATION; INTRACAUDAL; PERINEURAL PRN
Status: DISCONTINUED | OUTPATIENT
Start: 2021-09-14 | End: 2021-09-14 | Stop reason: SURG

## 2021-09-14 RX ADMIN — ONDANSETRON 4 MG: 2 INJECTION INTRAMUSCULAR; INTRAVENOUS at 20:17

## 2021-09-14 RX ADMIN — SODIUM CHLORIDE, POTASSIUM CHLORIDE, SODIUM LACTATE AND CALCIUM CHLORIDE: 600; 310; 30; 20 INJECTION, SOLUTION INTRAVENOUS at 14:38

## 2021-09-14 RX ADMIN — TRANEXAMIC ACID 1000 MG: 100 INJECTION, SOLUTION INTRAVENOUS at 13:05

## 2021-09-14 RX ADMIN — Medication: at 11:48

## 2021-09-14 RX ADMIN — VANCOMYCIN HYDROCHLORIDE 1000 MG: 500 INJECTION, POWDER, LYOPHILIZED, FOR SOLUTION INTRAVENOUS at 12:32

## 2021-09-14 RX ADMIN — PROPOFOL 200 MG: 10 INJECTION, EMULSION INTRAVENOUS at 12:58

## 2021-09-14 RX ADMIN — SODIUM CHLORIDE, POTASSIUM CHLORIDE, SODIUM LACTATE AND CALCIUM CHLORIDE: 600; 310; 30; 20 INJECTION, SOLUTION INTRAVENOUS at 12:57

## 2021-09-14 RX ADMIN — LIDOCAINE HYDROCHLORIDE: 10 INJECTION, SOLUTION INFILTRATION; PERINEURAL at 11:48

## 2021-09-14 RX ADMIN — DOCUSATE SODIUM 100 MG: 100 CAPSULE, LIQUID FILLED ORAL at 17:15

## 2021-09-14 RX ADMIN — HYDROMORPHONE HYDROCHLORIDE 0.5 MG: 2 INJECTION, SOLUTION INTRAMUSCULAR; INTRAVENOUS; SUBCUTANEOUS at 13:28

## 2021-09-14 RX ADMIN — DEXAMETHASONE SODIUM PHOSPHATE 4 MG: 4 INJECTION, SOLUTION INTRAMUSCULAR; INTRAVENOUS at 12:58

## 2021-09-14 RX ADMIN — HYDROMORPHONE HYDROCHLORIDE 0.5 MG: 2 INJECTION, SOLUTION INTRAMUSCULAR; INTRAVENOUS; SUBCUTANEOUS at 14:27

## 2021-09-14 RX ADMIN — CELECOXIB 200 MG: 200 CAPSULE ORAL at 17:15

## 2021-09-14 RX ADMIN — BUPIVACAINE HYDROCHLORIDE 20 ML: 2.5 INJECTION, SOLUTION EPIDURAL; INFILTRATION; INTRACAUDAL; PERINEURAL at 13:00

## 2021-09-14 RX ADMIN — SODIUM CHLORIDE, POTASSIUM CHLORIDE, SODIUM LACTATE AND CALCIUM CHLORIDE: 600; 310; 30; 20 INJECTION, SOLUTION INTRAVENOUS at 11:49

## 2021-09-14 RX ADMIN — ONDANSETRON 4 MG: 2 INJECTION INTRAMUSCULAR; INTRAVENOUS at 14:51

## 2021-09-14 RX ADMIN — HYDROMORPHONE HYDROCHLORIDE 0.5 MG: 2 INJECTION, SOLUTION INTRAMUSCULAR; INTRAVENOUS; SUBCUTANEOUS at 14:00

## 2021-09-14 RX ADMIN — CEFAZOLIN 2 G: 1 INJECTION, POWDER, FOR SOLUTION INTRAVENOUS at 17:14

## 2021-09-14 RX ADMIN — LIDOCAINE HYDROCHLORIDE 50 MG: 20 INJECTION, SOLUTION EPIDURAL; INFILTRATION; INTRACAUDAL; PERINEURAL at 12:58

## 2021-09-14 RX ADMIN — HYDROMORPHONE HYDROCHLORIDE 0.5 MG: 2 INJECTION, SOLUTION INTRAMUSCULAR; INTRAVENOUS; SUBCUTANEOUS at 13:34

## 2021-09-14 RX ADMIN — ACETAMINOPHEN 1000 MG: 500 TABLET, FILM COATED ORAL at 17:16

## 2021-09-14 RX ADMIN — ACETAMINOPHEN 1000 MG: 500 TABLET, FILM COATED ORAL at 23:55

## 2021-09-14 ASSESSMENT — COGNITIVE AND FUNCTIONAL STATUS - GENERAL
HELP NEEDED FOR BATHING: A LOT
MOBILITY SCORE: 13
TURNING FROM BACK TO SIDE WHILE IN FLAT BAD: A LITTLE
TOILETING: A LOT
MOVING FROM LYING ON BACK TO SITTING ON SIDE OF FLAT BED: A LOT
DAILY ACTIVITIY SCORE: 18
MOVING TO AND FROM BED TO CHAIR: A LOT
DRESSING REGULAR LOWER BODY CLOTHING: A LOT
CLIMB 3 TO 5 STEPS WITH RAILING: A LOT
SUGGESTED CMS G CODE MODIFIER DAILY ACTIVITY: CK
SUGGESTED CMS G CODE MODIFIER MOBILITY: CL
WALKING IN HOSPITAL ROOM: A LOT
STANDING UP FROM CHAIR USING ARMS: A LOT

## 2021-09-14 ASSESSMENT — PATIENT HEALTH QUESTIONNAIRE - PHQ9
2. FEELING DOWN, DEPRESSED, IRRITABLE, OR HOPELESS: NOT AT ALL
SUM OF ALL RESPONSES TO PHQ9 QUESTIONS 1 AND 2: 0
1. LITTLE INTEREST OR PLEASURE IN DOING THINGS: NOT AT ALL

## 2021-09-14 ASSESSMENT — LIFESTYLE VARIABLES
ON A TYPICAL DAY WHEN YOU DRINK ALCOHOL HOW MANY DRINKS DO YOU HAVE: 0
HOW MANY TIMES IN THE PAST YEAR HAVE YOU HAD 5 OR MORE DRINKS IN A DAY: 0
TOTAL SCORE: 0
CONSUMPTION TOTAL: NEGATIVE
HAVE YOU EVER FELT YOU SHOULD CUT DOWN ON YOUR DRINKING: NO
TOTAL SCORE: 0
HAVE PEOPLE ANNOYED YOU BY CRITICIZING YOUR DRINKING: NO
TOTAL SCORE: 0
ALCOHOL_USE: NO
EVER HAD A DRINK FIRST THING IN THE MORNING TO STEADY YOUR NERVES TO GET RID OF A HANGOVER: NO
EVER FELT BAD OR GUILTY ABOUT YOUR DRINKING: NO
AVERAGE NUMBER OF DAYS PER WEEK YOU HAVE A DRINK CONTAINING ALCOHOL: 0

## 2021-09-14 ASSESSMENT — FIBROSIS 4 INDEX: FIB4 SCORE: 1.85

## 2021-09-14 NOTE — OP REPORT
DIAGNOSIS: Failure right total knee    PROCEDURE: Right total knee revision    ANESTHESIA: General.    COMPLICATIONS: None.    SURGEON: Lm Leonard MD    ASSISTANT: Ramya Griffith    INDICATIONS: This is a patient with severe pain secondary to osteoarthritis having failed conservative treatments.    DESCRIPTION OF PROCEDURE: Patient was identified in the preop area, site was   marked, taken back to the operating room and underwent general anesthesia.   The right lower extremity was prepped and draped in sterile manner.   Preoperative timeout was held and antibiotics were given. Incision was made   over the anterior knee, soft tissue was dissected down to the fascia. The   fascia was split in medial parapatellar approach.  There was metallosis throughout the knee.  Full synovectomy was performed.  The patellar component was extruded and there was wear on both the femoral and the tibial components.  The femoral and tibial components were removed using osteotomes in size with minimal bone loss.  Distal cut on the femur made a cleanup cut on the tibia use using intramedullary guidance.  Both the femur and the tibia were reamed up to size 18.  The femur sized to a 6 and required 5 mm distal augments and a 2 mm offset.  Tibia sized to a 4 and required a 4 mm offset.  The trials were removed and the 6 Legion femur revision with 5 mm distal augments, a 2 mm offset, and a 160 x 18 mm stem was assembled.  A 4 tibia with a 4 mm offset into 120 mm stem was assembled.  These were cemented and press-fit into place.  Final trialing showed that a 18 BCS poly provided stability throughout   the entire arc of motion and 18 BCS poly was placed. The wound was soaked with   dilute Betadine solution and was injected with Marcaine. Vicryl was used for   the fascia, Monocryl, soft tissue, skin and Dermabond for the final skin   layer. Patient was woken up, taken back to PACU, will be weightbear as   tolerated.

## 2021-09-14 NOTE — ANESTHESIA PROCEDURE NOTES
Airway    Date/Time: 9/14/2021 12:58 PM  Performed by: Tobey Gansert, M.D.  Authorized by: Tobey Gansert, M.D.     Location:  OR  Urgency:  Elective  Indications for Airway Management:  Anesthesia      Spontaneous Ventilation: absent    Sedation Level:  Deep  Preoxygenated: Yes    Final Airway Type:  Supraglottic airway  Final Supraglottic Airway:  Standard LMA    SGA Size:  3  Number of Attempts at Approach:  1

## 2021-09-14 NOTE — OR NURSING
1504- Patient arrived from OR post REVISION, TOTAL ARTHROPLASTY, KNEE, ALL COMPONENTS - Right. Respirations even via OPA and chin lift. VSS, see flowsheets. Surgical dressing to R knee C/D/I. Pedal pulses 2+ bilaterally. Toes warm and pink with brisk cap refill.   1510- Patient no longer requiring chin lift. Report to GUY Funk.

## 2021-09-14 NOTE — LETTER
September 1, 2021    [facility]  [address] [address]  [phone]    Patient Name: Mirian Hairston Marcie  Surgeon Name: Surgeon(s):  Lm Gabriel M.D.  Ramya Griffith P.A.-C.  Surgery Facility: [facility] ([facility])  Surgery Date: 9/14/2021    The time of your surgery is not final and may change up to and until the day of your surgery. You will be contacted 24-48 hours prior to your surgery date with your check-in and surgery time.    If you will not be at one of the below numbers please call/text the surgery scheduler at [phone]  Cell Phone: [phone]    BEFORE YOUR SURGERY  Pre Registration and/or Lab Work must be done within and no earlier than 28 days prior to your surgery date. [place] [facility] [phone] for an appointment as soon as possible.      Please refrain from smoking any substance after midnight prior to surgery. Smoking may interfere with the anesthetic and frequently produces nausea during the recovery period.    Continue taking all lifesaving medications. Including the morning of your surgery with small sip of water.    Please read the MEDICATION INSTRUCTIONS below completely.    DAY OF YOUR SURGERY  Nothing to eat or drink after midnight     Please arrive at the hospital/surgery center at the check-in time provided.     An adult will need to bring you and take you home after your surgery.     AFTER YOUR SURGERY  Post op Appointment:   Date: 09/29/21   Time: 2:30PM   With: DR GABRIEL   Location: [address] [phone]    - Therapy- Your first appointment should be 1  week(s) after your surgery. For your convenience we have 4 Physical Therapy locations: [place], [facility], [place], and [facility]. Call our office ASAP to schedule an appointment at [phone] or take the enclosed Therapy Prescription to a facility of your choice.  - No dental work for 3-6 months after your  surgery.    TIME OFF WORK  FMLA or Disability forms can be faxed directly to: [phone] or you may drop them off at [address] [place] [phone]. Our office charges a $35.00 fee per form. Forms will be completed within 10 business days of drop off and payment received. For the status of your forms you may contact our disability office directly [phone].    MEDICATION INSTRUCTIONS  The following medications should be stopped a minimum of 10 days prior to surgery:  All over the counter, Supplements & Herbal medications    Anorectics: Phentermine (Adipex-P, Lomaira and Suprenza), Phentermine-topiramate (Qsymia), Bupropion-naltrexone (Contrave)    Opiod Partial Agonists/Opioid Antagonists: Buprenorphine (Subocone, Belbuca, Butrans, Probuphine Implant, Sublocade), Naltrexone (ReVia, Vivitrol), Naloxone    Amphetamines: Dextroamphetamine/Amphetamine (Adderall, Mydayis), Methylphenidate Hydrochloride (Concerta, Metadate, Methylin, Ritalin)    The following medications should be stopped 5 days prior to surgery:  Blood Thinners: Any Aspirin, Aspirin products, anti-inflammatories such as ibuprofen and any blood thinners such as Coumadin and Plavix. Please consult your prescribing physician if you are on life saving blood thinners, in regards to when to stop medications prior to surgery.     The following medications should be stopped a minimum of 3 days prior to surgery:  PDE-5 inhibitors: Sildenafil (Viagra), Tadalafil (Cialis), Vardenafil (Levitra), Avanafil (Stendra)    MAO Inhibitors: Rasagiline (Azilect), Selegiline (Eldepryl, Emsam, Selapar), Isocarboxazid (Marplan), Phenelzine (Nardil)

## 2021-09-14 NOTE — ANESTHESIA TIME REPORT
Anesthesia Start and Stop Event Times     Date Time Event    9/14/2021 1222 Ready for Procedure     1253 Anesthesia Start     1503 Anesthesia Stop        Responsible Staff  09/14/21    Name Role Begin End    Tobey Gansert, M.D. Anesth 1253 1503        Preop Diagnosis (Free Text):  Pre-op Diagnosis     Presence of right artificial knee joint [Z96.651]        Preop Diagnosis (Codes):  Diagnosis Information     Diagnosis Code(s): Presence of right artificial knee joint [Z96.651]        Post op Diagnosis  Osteoarthritis of right knee      Premium Reason  A. 3PM - 7AM    Comments:

## 2021-09-14 NOTE — ANESTHESIA PROCEDURE NOTES
Peripheral Block    Date/Time: 9/14/2021 1:00 PM  Performed by: Tobey Gansert, M.D.  Authorized by: Tobey Gansert, M.D.     Start Time:  9/14/2021 1:00 PM  End Time:  9/14/2021 1:05 PM  Reason for Block: at surgeon's request and post-op pain management ONLY    patient identified, IV checked, site marked, risks and benefits discussed, surgical consent, monitors and equipment checked, pre-op evaluation and timeout performed    Patient Position:  Supine  Prep: ChloraPrep    Monitoring:  Heart rate, continuous pulse ox and cardiac monitor  Block Region:  Lower Extremity  Lower Extremity - Block Type:  Selective FEMORAL nerve block at the Adductor Canal    Laterality:  Right  Procedures: ultrasound guided  Image captured, interpreted and electronically stored.  Local Infiltration:  Lidocaine  Strength:  1 %  Dose:  3 ml  Block Type:  Single-shot  Needle Length:  100mm  Needle Gauge:  21 G  Needle Localization:  Ultrasound guidance  Injection Assessment:  Negative aspiration for heme, no paresthesia on injection, incremental injection and local visualized surrounding nerve on ultrasound  Evidence of intravascular injection: No     US Guided Selective Femoral Nerve Block at Adductor Canal:   US probe placed at mid-thigh level on externally rotated leg and femur identified.  Probe directed medially until Sartorius Muscle (SM), Femoral Artery (FA) and Saphenous Nerve (SN) identified in Adductor Canal (AC).  Needle inserted anterolateral to probe in an in plane approach into a subsartorial perivascular perineural position.  After negative aspiration LA injected with ease and visualized spreading within the AC.

## 2021-09-14 NOTE — ANESTHESIA POSTPROCEDURE EVALUATION
Patient: Mirian Jack    Procedure Summary     Date: 09/14/21 Room / Location:  OR 06 / SURGERY [facility]    Anesthesia Start: 1253 Anesthesia Stop: 1503    Procedure: REVISION, TOTAL ARTHROPLASTY, KNEE, ALL COMPONENTS (Right Knee) Diagnosis:       Presence of right artificial knee joint      (Presence of right artificial knee joint [Z96.651])    Surgeons: Lm Leonard M.D. Responsible Provider: Tobey Gansert, M.D.    Anesthesia Type: general, peripheral nerve block ASA Status: 2          Final Anesthesia Type: general, peripheral nerve block  Last vitals  BP   Blood Pressure : 145/72    Temp   36.3 °C (97.3 °F)    Pulse   81   Resp   16    SpO2   95 %      Anesthesia Post Evaluation    Patient location during evaluation: PACU  Patient participation: complete - patient participated  Level of consciousness: awake and alert    Airway patency: patent  Anesthetic complications: no  Cardiovascular status: hemodynamically stable  Respiratory status: acceptable  Hydration status: euvolemic    PONV: none          No complications documented.     Nurse Pain Score: 5 (NPRS)

## 2021-09-14 NOTE — ANESTHESIA PREPROCEDURE EVALUATION
Relevant Problems   PULMONARY   (positive) DAVEN[place] (dyspnea on exertion)      NEURO   (positive) History of basal cell carcinoma   (positive) History of tubular adenoma of the colon      CARDIAC   (positive) Aortic atherosclerosis (HCC)   (positive) Coronary artery calcification seen on CAT scan   (positive) DAVENPORT (dyspnea on exertion)       Physical Exam    Airway   Mallampati: II  TM distance: >3 FB  Neck ROM: full       Cardiovascular - normal exam  Rhythm: regular  Rate: normal  (-) murmur     Dental - normal exam           Pulmonary - normal exam  Breath sounds clear to auscultation     Abdominal    Neurological - normal exam                 Anesthesia Plan    ASA 2       Plan - general and peripheral nerve block     Peripheral nerve block will be post-op pain control  Airway plan will be LMA          Induction: intravenous    Postoperative Plan: Postoperative administration of opioids is intended.    Pertinent diagnostic labs and testing reviewed    Informed Consent:    Anesthetic plan and risks discussed with patient.    Use of blood products discussed with: patient whom consented to blood products.

## 2021-09-14 NOTE — OR SURGEON
Immediate Post OP Note    PreOp Diagnosis: Failed right TKA      PostOp Diagnosis: Same      Procedure(s):  REVISION, TOTAL ARTHROPLASTY, KNEE, ALL COMPONENTS - Wound Class: Clean    Surgeon(s):  REYES Lopez M.D. Jackson B Jones, M.D.    Anesthesiologist/Type of Anesthesia:  Anesthesiologist: Tobey Gansert, M.D./General    Surgical Staff:  Circulator: Robby Rees R.N.  Scrub Person: Wen Monzon    Specimens removed if any:  * No specimens in log *    Estimated Blood Loss:  150cc      Complications: None        9/14/2021 3:00 PM Jae Feldman M.D.

## 2021-09-14 NOTE — OR NURSING
"1510 REPORT RECEIVED FROM DAFNE TOVAR TO ASSUME CARE OF THIS PT. PT NON RESPONSIVE, OPA IN SITU. VSS.     1522 PT AWAKE, FOLLOWS COMMANDS. D/C OPA. REORIENT PT TO PLACE AND TIME.  FALLS TO SLEEP.  R PEDAL PULSE +2, MARKED WITH BLACK X.     1527 VSS.      1542 VSS.     1558 VSS. PT AWAKE BUT DROWSY. AAOX4. DENIES PAIN, NAUSEA.     1608 VSS. PT MEETS CRITERIA FOR TRANSFER TO ROOM AFTER XRAY.     1609 XRAY AT BEDSIDE.     1620 PT REPORTS RLE FEELS LIKE \"FULLNESS\". DENIES PAIN. PT READY FOR TRANSFER .      "

## 2021-09-14 NOTE — PROGRESS NOTES
Pt arrived on unit.   POC and medications reviewed with pt. Pt verbalized understanding.  Frequent vital signs started.  Pain tolerable.  Dressing CDI.  Was recommended to stay overnight per surgeon.  Safety measures in place.   Will continue to monitor.

## 2021-09-15 VITALS
TEMPERATURE: 97.6 F | WEIGHT: 154.76 LBS | SYSTOLIC BLOOD PRESSURE: 113 MMHG | RESPIRATION RATE: 17 BRPM | OXYGEN SATURATION: 94 % | HEART RATE: 82 BPM | DIASTOLIC BLOOD PRESSURE: 73 MMHG | HEIGHT: 69 IN | BODY MASS INDEX: 22.92 KG/M2

## 2021-09-15 LAB
ANION GAP SERPL CALC-SCNC: 9 MMOL/L (ref 7–16)
BUN SERPL-MCNC: 11 MG/DL (ref 8–22)
CALCIUM SERPL-MCNC: 8.3 MG/DL (ref 8.4–10.2)
CHLORIDE SERPL-SCNC: 100 MMOL/L (ref 96–112)
CO2 SERPL-SCNC: 24 MMOL/L (ref 20–33)
CREAT SERPL-MCNC: 0.6 MG/DL (ref 0.5–1.4)
ERYTHROCYTE [DISTWIDTH] IN BLOOD BY AUTOMATED COUNT: 47.9 FL (ref 35.9–50)
GLUCOSE SERPL-MCNC: 122 MG/DL (ref 65–99)
HCT VFR BLD AUTO: 33 % (ref 37–47)
HGB BLD-MCNC: 10.8 G/DL (ref 12–16)
MCH RBC QN AUTO: 34.4 PG (ref 27–33)
MCHC RBC AUTO-ENTMCNC: 32.7 G/DL (ref 33.6–35)
MCV RBC AUTO: 105.1 FL (ref 81.4–97.8)
PLATELET # BLD AUTO: 211 K/UL (ref 164–446)
PMV BLD AUTO: 8.8 FL (ref 9–12.9)
POTASSIUM SERPL-SCNC: 4.4 MMOL/L (ref 3.6–5.5)
RBC # BLD AUTO: 3.14 M/UL (ref 4.2–5.4)
SODIUM SERPL-SCNC: 133 MMOL/L (ref 135–145)
WBC # BLD AUTO: 8.6 K/UL (ref 4.8–10.8)

## 2021-09-15 PROCEDURE — 96375 TX/PRO/DX INJ NEW DRUG ADDON: CPT

## 2021-09-15 PROCEDURE — 80048 BASIC METABOLIC PNL TOTAL CA: CPT

## 2021-09-15 PROCEDURE — 700102 HCHG RX REV CODE 250 W/ 637 OVERRIDE(OP): Performed by: STUDENT IN AN ORGANIZED HEALTH CARE EDUCATION/TRAINING PROGRAM

## 2021-09-15 PROCEDURE — 700111 HCHG RX REV CODE 636 W/ 250 OVERRIDE (IP): Performed by: STUDENT IN AN ORGANIZED HEALTH CARE EDUCATION/TRAINING PROGRAM

## 2021-09-15 PROCEDURE — 85027 COMPLETE CBC AUTOMATED: CPT

## 2021-09-15 PROCEDURE — 97535 SELF CARE MNGMENT TRAINING: CPT

## 2021-09-15 PROCEDURE — G0378 HOSPITAL OBSERVATION PER HR: HCPCS

## 2021-09-15 PROCEDURE — 97162 PT EVAL MOD COMPLEX 30 MIN: CPT

## 2021-09-15 PROCEDURE — A9270 NON-COVERED ITEM OR SERVICE: HCPCS | Performed by: STUDENT IN AN ORGANIZED HEALTH CARE EDUCATION/TRAINING PROGRAM

## 2021-09-15 PROCEDURE — 36415 COLL VENOUS BLD VENIPUNCTURE: CPT

## 2021-09-15 RX ORDER — ONDANSETRON 4 MG/1
4 TABLET, ORALLY DISINTEGRATING ORAL EVERY 8 HOURS PRN
Qty: 15 TABLET | Refills: 0 | Status: SHIPPED | OUTPATIENT
Start: 2021-09-15 | End: 2021-11-01

## 2021-09-15 RX ORDER — OXYCODONE HYDROCHLORIDE 5 MG/1
5 TABLET ORAL EVERY 4 HOURS PRN
Qty: 45 TABLET | Refills: 0 | Status: SHIPPED | OUTPATIENT
Start: 2021-09-15 | End: 2021-09-22

## 2021-09-15 RX ORDER — ASPIRIN 81 MG/1
81 TABLET ORAL
Qty: 56 TABLET | Refills: 0 | Status: SHIPPED | OUTPATIENT
Start: 2021-09-15 | End: 2021-10-13

## 2021-09-15 RX ADMIN — DOCUSATE SODIUM 100 MG: 100 CAPSULE, LIQUID FILLED ORAL at 05:09

## 2021-09-15 RX ADMIN — DEXAMETHASONE SODIUM PHOSPHATE 10 MG: 4 INJECTION, SOLUTION INTRA-ARTICULAR; INTRALESIONAL; INTRAMUSCULAR; INTRAVENOUS; SOFT TISSUE at 05:10

## 2021-09-15 RX ADMIN — CELECOXIB 200 MG: 200 CAPSULE ORAL at 05:10

## 2021-09-15 RX ADMIN — ACETAMINOPHEN 1000 MG: 500 TABLET, FILM COATED ORAL at 05:09

## 2021-09-15 RX ADMIN — ASPIRIN 81 MG: 81 TABLET, COATED ORAL at 03:08

## 2021-09-15 ASSESSMENT — COGNITIVE AND FUNCTIONAL STATUS - GENERAL
SUGGESTED CMS G CODE MODIFIER MOBILITY: CH
MOBILITY SCORE: 24

## 2021-09-15 ASSESSMENT — GAIT ASSESSMENTS
GAIT LEVEL OF ASSIST: SUPERVISED
DISTANCE (FEET): 150
DEVIATION: STEP TO
ASSISTIVE DEVICE: FRONT WHEEL WALKER

## 2021-09-15 ASSESSMENT — PAIN DESCRIPTION - PAIN TYPE: TYPE: ACUTE PAIN

## 2021-09-15 NOTE — THERAPY
Physical Therapy   Initial Evaluation     Patient Name: Mirian Jack  Age:  80 y.o., Sex:  female  Medical Record #: [id number]  Today's Date: 9/15/2021     Precautions  Precautions: (P) Weight Bearing As Tolerated Right Lower Extremity    Assessment  Patient is 80 y.o. female with a diagnosis of R TKR Pt lives at home with  and is ctive.Pt is safe with bed mob,transfers,ambulation and stairs,she understands HEP and has no equipment needs     Plan    Recommend Physical Therapy for Evaluation only    09/15/21 0900   Total Time Spent   Total Time Spent (Mins) 40   Charge Group   PT Evaluation PT Evaluation Mod   Initial Contact Note    Initial Contact Note Order Received and Verified, Physical Therapy Evaluation in Progress with Full Report to Follow.   Precautions   Precautions Weight Bearing As Tolerated Right Lower Extremity   Vitals   Pulse 82   Patient BP Position Supine   Blood Pressure  113/73   Respiration 17   Pulse Oximetry 94 %   O2 (LPM) 2   O2 Delivery Device Silicone Nasal Cannula   Pain 0 - 10 Group   Therapist Pain Assessment 2   Prior Living Situation   Prior Services None   Housing / Facility 1 Story House   Steps Into Home 2   Steps In Home 0   Equipment Owned Front-Wheel Walker;Single Point Cane   Lives with - Patient's Self Care Capacity Spouse   Prior Level of Functional Mobility   Bed Mobility Independent   Transfer Status Independent   Ambulation Independent   Distance Ambulation (Feet)   (community amb)   Assistive Devices Used None   Stairs Independent   History of Falls   History of Falls No   Cognition    Cognition / Consciousness WDL   Level of Consciousness Alert   Passive ROM Lower Body   Passive ROM Lower Body X   Rt Knee Flexion Degrees 100   Rt Knee Extension Degrees 0   Active ROM Lower Body    Active ROM Lower Body  X   Strength Lower Body   Lower Body Strength  X   Coordination Upper Body   Coordination WDL   Coordination Lower Body    Coordination Lower Body  WDL   Balance  Assessment   Sitting Balance (Static) Good   Sitting Balance (Dynamic) Good   Standing Balance (Static) Good   Standing Balance (Dynamic) Good   Weight Shift Sitting Good   Weight Shift Standing Good   Gait Analysis   Gait Level Of Assist Supervised   Assistive Device Front Wheel Walker   Distance (Feet) 150   # of Times Distance was Traveled 2   Deviation Step To   # of Stairs Climbed 1   Level of Assist with Stairs Supervised   Weight Bearing Status wbat R   Bed Mobility    Supine to Sit Modified Independent   Sit to Supine Modified Independent   Scooting Modified Independent   Functional Mobility   Sit to Stand Supervised   Bed, Chair, Wheelchair Transfer Supervised   Transfer Method Stand Step   How much difficulty does the patient currently have...   Turning over in bed (including adjusting bedclothes, sheets and blankets)? 4   Sitting down on and standing up from a chair with arms (e.g., wheelchair, bedside commode, etc.) 4   Moving from lying on back to sitting on the side of the bed? 4   How much help from another person does the patient currently need...   Moving to and from a bed to a chair (including a wheelchair)? 4   Need to walk in a hospital room? 4   Climbing 3-5 steps with a railing? 4   6 clicks Mobility Score 24   Activity Tolerance   Sitting Edge of Bed 10   Standing 10   Patient / Family Goals    Patient / Family Goal #1 Home   Anticipated Discharge Equipment and Recommendations   DC Equipment Recommendations None   Discharge Recommendations Recommend outpatient physical therapy services to address higher level deficits   Interdisciplinary Plan of Care Collaboration   IDT Collaboration with  Nursing   Session Information   Date / Session Number  9/15   Priority 0       DC Equipment Recommendations: (P) None  Discharge Recommendations: (P) Recommend outpatient physical therapy services to address higher level deficits

## 2021-09-15 NOTE — DISCHARGE PLANNING
Anticipated Discharge Disposition: Home with DME- walker     Action: DME order for walker in place. LSW met with pt at bedside to discuss DME CHOICE. Pt informed LSW that she has a FWW, cane, and ice machine available upon d/c.   Pt's spouse to provide transportation upon d/c.     Pt pending PT and OT at this time.     Barriers to Discharge: None     Plan: LSW to assist as needed

## 2021-09-15 NOTE — THERAPY
Occupational Therapy  Contact Note    Patient Name: Mirian Hairston Bykellen  Age:  80 y.o., Sex:  female  Medical Record #: [id number]  Today's Date: 9/15/2021    No formal OT evaluation provided to pt. Pt is an active Physical Therapist, currently POD #1 R TKA revision. Pt provided education re: dressing and shower techniques, and acknowledged awareness of post op instructions. Pt will receive good support from , and dtr who is a PT as well, and ROMIE who is an orthopedic surgeon. No acute OT needs at this time.    Mine Garcia, OT

## 2021-09-15 NOTE — PROGRESS NOTES
COVID-19 surge in effect     Report received from NOC RN, assumed care of pt.   POC and medications reviewed with pt. Pt verbalized understanding.   AOx4     THERAPY NOTE    Patient Active Problem List   Diagnosis     Altered mental status     Auditory hallucination     Aggression         Duration: Met with patient on 12/28/2020, for a total of 5 minutes.    Patient Goals: The patient identified their treatment goals as unidentified.     Interventions used: Active/Reflective Listening; Validation of verbalized feelings; exploratory/clarification questions; emotional reassurance; unconditional positive regard; behavioral observation    Patient progress/response:     Writer met with pt briefly to check-in. Pt was observed pacing back and forth in the same path just outside his room and back into his room during the entire visit. Pt made eye contact periodically, did not answer writer questions, but would occasionally make requests to staff and writer, such as to turn the TV down (volume). Writer gave him a stick fidget to play with and he did not appear receptive to accept it. Writer placed this on his desk. Pt did not show that he ate much of his lunch.     Assessment or plan: Plan to continue to assess and monitor. Discharge to home with services.

## 2021-09-15 NOTE — DISCHARGE INSTRUCTIONS
Discharge Instructions    Discharged to home by car with relative. Discharged via wheelchair, hospital escort: Yes.  Special equipment needed: Not Applicable    Be sure to schedule a follow-up appointment with your primary care doctor or any specialists as instructed.     Discharge Plan:        I understand that a diet low in cholesterol, fat, and sodium is recommended for good health. Unless I have been given specific instructions below for another diet, I accept this instruction as my diet prescription.   Other diet: Regular    Special Instructions: Discharge instructions for the Orthopedic Patient    Follow up with Primary Care Physician within 2 weeks of discharge to home, regarding:  Review of medications and diagnostic testing.  Surveillance for medical complications.  Workup and treatment of osteoporosis, if appropriate.     -Is this a Hip/Knee/Shoulder Joint Replacement patient? Yes   TOTAL KNEE REPLACEMENT, AFTER-CARE GUIDELINES     These instructions provide you with information on caring for yourself and your knee after surgery. Your health care provider may also give you instructions that are more specific. Your treatment was planned and performed according to current medical practices but problems sometimes occur. Call your health care provider if you have any problems or questions.     WHAT TO EXPECT AFTER THE PROCEDURE   After your procedure, your knee will typically be stiff, sore, and bruised. This will improve over time.     Pain   · Follow your home pain management plan as discussed with your nurse and as directed by your provider.   · It is important to follow any scheduled pain medications for maximal pain relief.   · If prescribed opioid medication, the goal is to use opioids only as needed and to wean off prescription pain medicine as soon as possible.   · Ice can be used for pain control.   · Put ice in a plastic bag.   · Place a towel between your skin and the bag.   · Leave the ice on for  20 minutes, 2-3 times a day at a minimum.   · Most patients are off the pain pills by 3 weeks. If your pain continues to be severe, follow up with your provider.     Infection   Knee joint infections occur in fewer than 2% of patients. The most common causes of infection following total knee replacement surgery are from bacteria that enter the bloodstream during dental procedures, urinary tract infections, or skin infections. These bacteria can lodge around your knee replacement and cause an infection.   · Keep the incision as clean and dry as possible.   · Always wash your hands before touching your incision.   · Avoid dental care for 3 months after surgery. Your provider may recommend taking a dose of antibiotics an hour prior to any dental procedure. After 2 years, most providers recommend antibiotics only before an extensive procedure. Ask your provider what they recommend.   · Signs and symptoms of infection include low-grade fever, redness, pain, swelling and drainage from your incision. Notify your provider IMMEDIATELY if you develop ANY of these symptoms.     Post op Disturbances   · Bowel Habits - Constipation is extremely common and caused by a combination of anesthesia, lack of mobility, dehydration and pain medicine. Use stool softeners or laxatives if necessary. It is important not to ignore this problem as bowel obstructions can be a serious complication after joint replacement surgery.   · Mood/Energy Level - Many patients experience a lack of energy and endurance for up to 2-3 months after surgery. Some people feel down and can even become depressed. This is likely due to postoperative anemia, change in activity level, lack of sleep, pain medicine and just the emotional reaction to the surgery itself that is a big disruption in a person’s life. This usually passes. If symptoms persist, follow up with your primary care provider.  · Returning to Work - Your provider will give you specific instructions  based on your profession. Generally, if you work a sedentary job requiring little standing or walking, most patients may return within 2-6 weeks. Manual labor jobs involving walking, lifting and standing may take 3-4 months. Your provider’s office can provide a release to part-time or light duty work early on in your recovery and progress you to full duty as able.   · Driving - You can begin driving once cleared by your provider, provided you are no longer taking narcotic pain medication or any other medications that impair driving. Discuss the length of time expected with your provider as returning to driving depends on things such as your vehicle, which knee was replaced (right or left), and knee motion, strength and reflexes returning appropriately.   · Avoiding falls - A fall during the first few weeks after surgery can damage your new knee and may result in a need for further surgery.  throw rugs and tack down loose carpeting. Be aware of floor hazards such as pets, small objects or uneven surfaces. Notify your provider of any falls.   · Airport Metal Detectors - The sensitivity of metal detectors varies and it is likely that your prosthesis will cause an alarm. Inform the  of your artificial joint.     Diet   · Resume your normal diet as tolerated.   · It is important to achieve a healthy nutritional status by eating a well-balanced diet on a regular basis.   · Your provider may recommend that you take iron and vitamin supplements.   · Continue to drink plenty of fluids.     Shower/Bathing   · You may shower as soon as you get home from the hospital unless otherwise instructed.   · Keep your incision out of water to prevent infection. To keep the incision dry when showering, cover it with a plastic bag or plastic wrap. If your bandage is waterproof, this may not be necessary. o Pat incision dry if it gets wet. Do not rub. Notify your provider.   · Do not submerge in a bath until cleared  by your provider. Your staples must be out and the incision completely healed.     Dressing Change: Only change your dressing if directed by your provider.   · Wash hands.   · Open all dressing change materials.   · Remove old dressing and discard.   · Inspect incision for signs of irritation or infection including redness, increase in clear drainage, yellow/green drainage, odor and surrounding skin hot to touch. Notify your provider if present.   ·  the new dressing by one corner and lay over the incision. Be careful not to touch the inside of the dressing that will lay over the incision.   · Secure in place as instructed. Swelling/Bruising   · Swelling is normal after knee replacement and can involve the thigh, knee, calf and foot.   · Swelling can last from 3-6 months.   · To reduce swelling, elevate your leg higher than your heart while reclining. The first week you are home you should elevate your leg an equal amount of time as you are active.   · The swelling is usually worse after you go home since you are upright for longer periods of time.   · Bruising often does not appear until after you arrive home and can be quite dramatic- appearing purple, black, or green. Bruising is typically not concerning and will subside without any treatment.     Blood Clot Prevention   Your treatment plan includes multiple preventative measures to decrease the risk of blood clots in the legs (DVTs) and the less common, but serious, clots that travel to the lungs (pulmonary emboli). Most patients are at standard risk for them, but people who are at higher risk include those who have had previous clots, a family history of clotting, smoking, diabetes, obesity, advanced age, use estrogen and/or live a sedentary lifestyle.     · Signs of blood clots in legs include - Swelling in thigh, calf or ankle that does not go down with elevation. Pain, heat and tenderness in calf, back of calf or groin area. NOTE: blood clots can  occur in either leg.   · Signs of blood clots in lungs include - Sudden increased shortness of breath, sudden onset of chest pain, and localized chest pain with coughing.   · If you experience any of the above symptoms, notify your provider and seek medical attention immediately.   · You received anticoagulant therapy (blood thinners) in the hospital. Continue the prescribed blood-thinning medication at home, as directed by your provider.   · Your risk for developing a clot continues for up to 2-3 months after surgery. Avoid prolonged sitting and dehydration (long air trips and car trips). If you do take a trip during this time, please get up, move around every 1-1.5 hours, and discuss all travel plans with your provider.     Activity   Once home, stay active. The key is not to overdo it. While you can expect some good days and some bad days, you should notice a gradual improvement and a gradual increase in your endurance over the next 6 to 12 months. Exercise is a critical component of recovery, particularly during the first few weeks after surgery.     · Normal activities of daily living - Expect to resume most within 3 to 6 weeks following surgery. Some pain with activity and at night is common for several weeks after surgery. Walk as much as you like once your doctor gives permission to proceed, but remember that walking is no substitute for the exercises your doctor and physical therapist prescribe. Use a walker, crutches or cane to assist with walking until you can walk smoothly (minimal or no limp) without assistance.   · Physical Therapy Exercises - Follow your home exercise program as instructed by your physical therapist during your hospital stay. Call and set up outpatient physical therapy appointments per your provider’s recommendations. Physical therapy after the hospital stay focuses on increasing your range of motion, strengthening your muscles and improving your gait/walking pattern. Contact your  provider for the referral to outpatient physical therapy if you have not yet received this. -   · Riding a stationary bicycle can help maintain muscle tone and keep your knee flexible. Begin stationary bicycling as directed by your physical therapist or provider.   · Sexual Activity - Your provider can tell you when it safe to resume sexual activity.   · Sleeping Positions - You can safely sleep on your back, on either side, or on your stomach.   · Other Activities - Lower impact activities are preferred. Consult your provider if you have specific questions.     When to Call the Doctor   Call the provider if you experience:   · Fever over 100.5° F   · Increased pain, drainage, redness, odor or heat around the incision area   · Shaking chills   · Increased knee pain with activity and rest   · Increased pain in calf, tenderness or redness above or below the knee   · Increased swelling of calf, ankle, foot   · Sudden increased shortness of breath, sudden onset of chest pain, localized chest pain with coughing   · Incision opening   Or, if there are any questions or concerns about medications or care.     Infection statistic resource:   [email or web address]-[email or web address]-[email or web address]-[email or web address]     -Is this patient being discharged with medication to prevent blood clots?  Yes, Aspirin Aspirin, ASA oral tablets  What is this medicine?  ASPIRIN (AS pir in) is a pain reliever. It is used to treat mild pain and fever. This medicine is also used as directed by a doctor to prevent and to treat heart attacks, to prevent strokes and blood clots, and to treat arthritis or inflammation.  This medicine may be used for other purposes; ask your health care provider or pharmacist if you have questions.  COMMON BRAND NAME(S): Aspir-Low, Aspir-Seema, Aspirtab, Suzette Advanced Aspirin, Suzette Aspirin, Smart Adventure Aspirin Extra Strength, Smart Adventure Aspirin Plus, Suzette Extra Strength,  Suzette Extra Strength Plus, Suzette Genuine Aspirin, Suzette Womens Aspirin, Bufferin, Bufferin Extra Strength, Bufferin Low Dose  What should I tell my health care provider before I take this medicine?  They need to know if you have any of these conditions:  · anemia  · asthma  · bleeding problems  · child with chickenpox, the flu, or other viral infection  · diabetes  · gout  · if you frequently drink alcohol containing drinks  · kidney disease  · liver disease  · low level of vitamin K  · lupus  · smoke tobacco  · stomach ulcers or other problems  · an unusual or allergic reaction to aspirin, tartrazine dye, other medicines, dyes, or preservatives  · pregnant or trying to get pregnant  · breast-feeding  How should I use this medicine?  Take this medicine by mouth with a glass of water. Follow the directions on the package or prescription label. You can take this medicine with or without food. If it upsets your stomach, take it with food. Do not take your medicine more often than directed.  Talk to your pediatrician regarding the use of this medicine in children. While this drug may be prescribed for children as young as 12 years of age for selected conditions, precautions do apply. Children and teenagers should not use this medicine to treat chicken pox or flu symptoms unless directed by a doctor.  Patients over 65 years old may have a stronger reaction and need a smaller dose.  Overdosage: If you think you have taken too much of this medicine contact a poison control center or emergency room at once.  NOTE: This medicine is only for you. Do not share this medicine with others.  What if I miss a dose?  If you are taking this medicine on a regular schedule and miss a dose, take it as soon as you can. If it is almost time for your next dose, take only that dose. Do not take double or extra doses.  What may interact with this medicine?  Do not take this medicine with any of the following  medications:  · cidofovir  · ketorolac  · probenecid  This medicine may also interact with the following medications:  · alcohol  · alendronate  · bismuth subsalicylate  · flavocoxid  · herbal supplements like feverfew, garlic, maddy, ginkgo biloba, horse chestnut  · medicines for diabetes or glaucoma like acetazolamide, methazolamide  · medicines for gout  · medicines that treat or prevent blood clots like enoxaparin, heparin, ticlopidine, warfarin  · other aspirin and aspirin-like medicines  · NSAIDs, medicines for pain and inflammation, like ibuprofen or naproxen  · pemetrexed  · sulfinpyrazone  · varicella live vaccine  This list may not describe all possible interactions. Give your health care provider a list of all the medicines, herbs, non-prescription drugs, or dietary supplements you use. Also tell them if you smoke, drink alcohol, or use illegal drugs. Some items may interact with your medicine.  What should I watch for while using this medicine?  If you are treating yourself for pain, tell your doctor or health care professional if the pain lasts more than 10 days, if it gets worse, or if there is a new or different kind of pain. Tell your doctor if you see redness or swelling. Also, check with your doctor if you have a fever that lasts for more than 3 days. Only take this medicine to prevent heart attacks or blood clotting if prescribed by your doctor or health care professional.  Do not take aspirin or aspirin-like medicines with this medicine. Too much aspirin can be dangerous. Always read the labels carefully.  This medicine can irritate your stomach or cause bleeding problems. Do not smoke cigarettes or drink alcohol while taking this medicine. Do not lie down for 30 minutes after taking this medicine to prevent irritation to your throat.  If you are scheduled for any medical or dental procedure, tell your healthcare provider that you are taking this medicine. You may need to stop taking this  medicine before the procedure.  This medicine may be used to treat migraines. If you take migraine medicines for 10 or more days a month, your migraines may get worse. Keep a diary of headache days and medicine use. Contact your healthcare professional if your migraine attacks occur more frequently.  What side effects may I notice from receiving this medicine?  Side effects that you should report to your doctor or health care professional as soon as possible:  · allergic reactions like skin rash, itching or hives, swelling of the face, lips, or tongue  · breathing problems  · changes in hearing, ringing in the ears  · confusion  · general ill feeling or flu-like symptoms  · pain on swallowing  · redness, blistering, peeling or loosening of the skin, including inside the mouth or nose  · signs and symptoms of bleeding such as bloody or black, tarry stools; red or dark-brown urine; spitting up blood or brown material that looks like coffee grounds; red spots on the skin; unusual bruising or bleeding from the eye, gums, or nose  · trouble passing urine or change in the amount of urine  · unusually weak or tired  · yellowing of the eyes or skin  Side effects that usually do not require medical attention (report to your doctor or health care professional if they continue or are bothersome):  · diarrhea or constipation  · headache  · nausea, vomiting  · stomach gas, heartburn  This list may not describe all possible side effects. Call your doctor for medical advice about side effects. You may report side effects to FDA at 1-240-FDA-1166.  Where should I keep my medicine?  Keep out of the reach of children.  Store at room temperature between 15 and 30 degrees C (59 and 86 degrees F). Protect from heat and moisture. Do not use this medicine if it has a strong vinegar smell. Throw away any unused medicine after the expiration date.  NOTE: This sheet is a summary. It may not cover all possible information. If you have  questions about this medicine, talk to your doctor, pharmacist, or health care provider.  © 2020 [employer] (2018-01-30 10:42:13)      · Is patient discharged on Warfarin / Coumadin?   No     Depression / Suicide Risk    As you are discharged from this [facility] facility, it is important to learn how to keep safe from harming yourself.    Recognize the warning signs:  · Abrupt changes in personality, positive or negative- including increase in energy   · Giving away possessions  · Change in eating patterns- significant weight changes-  positive or negative  · Change in sleeping patterns- unable to sleep or sleeping all the time   · Unwillingness or inability to communicate  · Depression  · Unusual sadness, discouragement and loneliness  · Talk of wanting to die  · Neglect of personal appearance   · Rebelliousness- reckless behavior  · Withdrawal from people/activities they love  · Confusion- inability to concentrate     If you or a loved one observes any of these behaviors or has concerns about self-harm, here's what you can do:  · Talk about it- your feelings and reasons for harming yourself  · Remove any means that you might use to hurt yourself (examples: pills, rope, extension cords, firearm)  · Get professional help from the community (Mental Health, Substance Abuse, psychological counseling)  · Do not be alone:Call your Safe Contact- someone whom you trust who will be there for you.  · Call your local CRISIS HOTLINE 133-2379 or 566-636-6935  · Call your local Children's Mobile Crisis Response Team [place] (257) 904-5263 or www.[address] Mobile  · Call the toll free National Suicide Prevention Hotlines   · National Suicide Prevention Lifeline 288-734-CMQS (4106)  · National Hope Line Network 800-SUICIDE (732-1966)

## 2021-11-01 ENCOUNTER — OFFICE VISIT (OUTPATIENT)
Dept: CARDIOLOGY | Facility: MEDICAL CENTER | Age: 81
End: 2021-11-01
Payer: MEDICARE

## 2021-11-01 VITALS
WEIGHT: 152 LBS | HEIGHT: 69 IN | BODY MASS INDEX: 22.51 KG/M2 | SYSTOLIC BLOOD PRESSURE: 112 MMHG | OXYGEN SATURATION: 97 % | HEART RATE: 78 BPM | DIASTOLIC BLOOD PRESSURE: 60 MMHG | RESPIRATION RATE: 14 BRPM

## 2021-11-01 DIAGNOSIS — E78.5 DYSLIPIDEMIA: ICD-10-CM

## 2021-11-01 DIAGNOSIS — N28.9 RENAL DYSFUNCTION: ICD-10-CM

## 2021-11-01 DIAGNOSIS — I70.0 ATHEROSCLEROSIS OF AORTA (HCC): ICD-10-CM

## 2021-11-01 DIAGNOSIS — M81.0 AGE-RELATED OSTEOPOROSIS WITHOUT CURRENT PATHOLOGICAL FRACTURE: ICD-10-CM

## 2021-11-01 DIAGNOSIS — R06.09 DOE (DYSPNEA ON EXERTION): ICD-10-CM

## 2021-11-01 DIAGNOSIS — I25.10 CORONARY ARTERY CALCIFICATION SEEN ON CAT SCAN: ICD-10-CM

## 2021-11-01 PROCEDURE — 99213 OFFICE O/P EST LOW 20 MIN: CPT | Performed by: PHYSICIAN ASSISTANT

## 2021-11-01 ASSESSMENT — ENCOUNTER SYMPTOMS
SHORTNESS OF BREATH: 0
ORTHOPNEA: 0
CHILLS: 0
NAUSEA: 0
FEVER: 0
COUGH: 0
DIZZINESS: 0
PALPITATIONS: 0
PND: 0
DEPRESSION: 0

## 2021-11-01 ASSESSMENT — FIBROSIS 4 INDEX: FIB4 SCORE: 2.06

## 2021-11-01 NOTE — PROGRESS NOTES
Chief Complaint   Patient presents with   • Aortic Atherosclerosis   • Dyslipidemia          Subjective:   Mirian Jack is a 80 y.o. female who presents today for follow-up.    Patient of Dr. Gee.  Current medical problems include coronary artery calcifications on CT, HLD, statin intolerance.    Mirian started using the Repatha injections 1 month ago, she has had 2 injections.  She is not noticed any side effects.  She is recovering from a right knee replacement.  She just had her first tennis lesson today and is happy with the results.    She is a physical therapist.  Remains quite active with tennis, skiing, walking.    Past Medical History:   Diagnosis Date   • Cancer (HCC)     basal cell and squamous cell    • Cataract     Bilat IOLs   • High cholesterol    • Macular degeneration          Past Surgical History:   Procedure Laterality Date   • PB REVISE KNEE JOINT REPLACE,ALL PARTS Right 9/14/2021    Procedure: REVISION, TOTAL ARTHROPLASTY, KNEE, ALL COMPONENTS;  Surgeon: Lm Leonard M.D.;  Location: SURGERY [facility];  Service: Orthopedics   • HIP ARTHROPLASTY MIS TOTAL Right 2019   • LAMINOTOMY  2000    L4-5   • EYE SURGERY      lasik and cataracts   • FOOT SURGERY      dislocated toe   • KNEE ARTHROPLASTY TOTAL Right          Family History   Problem Relation Age of Onset   • Heart Disease Mother    • Stroke Father          Social History     Tobacco Use   Smoking Status Never Smoker   Smokeless Tobacco Never Used          Social History     Substance and Sexual Activity   Alcohol Use Yes    Comment: 1 glass of wine at dinner         Allergies   Allergen Reactions   • Ibuprofen Unspecified     GI upset and sweating if she takes Advil or Aspirin on a regular basis.         Outpatient Encounter Medications as of 11/1/2021   Medication Sig Dispense Refill   • Evolocumab, REPATHA, (REPATHA SURECLICK) 140 MG/ML Solution Auto-injector Inject 1 Each under the skin every 14 days. 2 mL 11   • amoxicillin  "(AMOXIL) 500 MG Cap TAKE 2 CAPSULES BY MOUTH 1 HOUR BEFORE APPOINTMENT THEN TAKE 1 CAPSULE BY MOUTH THREE TIMES DAILY UNTIL GONE     • magnesium gluconate (MAG-G) 500 MG tablet Take 500 mg by mouth 3 times a day.     • Probiotic Product (SOLUBLE FIBER/PROBIOTICS PO) Take  by mouth.     • ascorbic acid (ASCORBIC ACID) 500 MG Tab Take 500 mg by mouth every day.     • Turmeric 500 MG Tab Take  by mouth.     • calcium carbonate (RA CALCIUM) 500 MG Tab Take  by mouth.     • cyanocobalamin (VITAMIN B12) 1000 MCG Tab Take  by mouth.     • Cholecalciferol (VITAMIN D PO) Take 5,000 Units by mouth.     • Multiple Vitamin (MULTI-VITAMINS) Tab Take  by mouth.     • b complex vitamins tablet Take  by mouth.     • [DISCONTINUED] celecoxib (CELEBREX) 200 MG Cap TAKE 1 CAPSULE BY MOUTH EVERY DAY (Patient not taking: Reported on 11/1/2021) 30 Capsule 0   • [DISCONTINUED] ondansetron (ZOFRAN ODT) 4 MG TABLET DISPERSIBLE Take 1 Tablet by mouth every 8 hours as needed for Nausea. (Patient not taking: Reported on 11/1/2021) 15 Tablet 0   • [DISCONTINUED] chlorhexidine (PERIDEX) 0.12 % Solution        No facility-administered encounter medications on file as of 11/1/2021.         Review of Systems   Constitutional: Negative for chills and fever.        No unexpected weight changes   Respiratory: Negative for cough and shortness of breath.    Cardiovascular: Negative for chest pain, palpitations, orthopnea, leg swelling and PND.   Gastrointestinal: Negative for nausea.   Musculoskeletal:        Right knee swelling, right ankle swelling   Neurological: Negative for dizziness.   Psychiatric/Behavioral: Negative for depression.          Objective:   /60 (BP Location: Left arm, Patient Position: Sitting, BP Cuff Size: Adult)   Pulse 78   Resp 14   Ht 1.753 m (5' 9\")   Wt 68.9 kg (152 lb)   LMP  (LMP Unknown)   SpO2 97%   BMI 22.45 kg/m²        Physical Exam  Vitals reviewed.   Constitutional:       Appearance: Normal appearance. "      Comments: Appears younger than stated age   Neck:      Comments: No carotid bruits  Cardiovascular:      Rate and Rhythm: Normal rate and regular rhythm.      Heart sounds: No murmur heard.        Comments: Radial pulses 2+ bilaterally  PT pulses 2+ bilaterally    Pulmonary:      Effort: Pulmonary effort is normal.      Breath sounds: No wheezing or rales.   Abdominal:      General: There is no distension.   Musculoskeletal:      Comments: Mild nonpitting edema around right knee and right ankle   Skin:     General: Skin is warm and dry.      Capillary Refill: Capillary refill takes less than 2 seconds.   Neurological:      Mental Status: She is alert. Mental status is at baseline.   Psychiatric:         Judgment: Judgment normal.          CAD 2/4/2020:  Coronary calcification:  LMA - 10.5  LCX - 0.0  LAD - 493.5  RCA - 0.0  PDA - 0.0     Total Calcium Score: 504    Echo 3/17/21:  CONCLUSIONS  Normal left ventricular systolic function. Left ventricular ejection   fraction is visually estimated to be 60%.   Grade I diastolic dysfunction.  The right ventricle was normal in size and function.  Mild tricuspid regurgitation. Right ventricular systolic pressure is   estimated to be 43 mmHg.   No prior study is available for comparison.     Exercise treadmill Test 3/2/2021:  no evidence of ischemia and an exercise tolerance of 9 minutes which is adequate  Assessment:     1. Aortic atherosclerosis (HCC)     2. Dyslipidemia     3. Renal dysfunction     4. DAVENPORT (dyspnea on exertion)     5. Coronary artery calcification seen on CAT scan     6. Age-related osteoporosis without current pathological fracture          Medical Decision Making:  Today's Assessment / Plan:       Coronary calcium on CT  Low/Intermediate ASCVD risk  -No ischemia suggested by recent treadmill test  -Excellent exercise tolerance  - no aspirin        Dyslipidemia  Statin intolerance   -  8/2021  - Upper extremity weakness on Lipitor and low-dose  Crestor.  -Tolerating Repatha injections well.  -Repeat lipid panel in January    Follow up in 1yr, sooner if problems.

## 2021-12-01 ENCOUNTER — OFFICE VISIT (OUTPATIENT)
Dept: MEDICAL GROUP | Facility: LAB | Age: 81
End: 2021-12-01
Payer: MEDICARE

## 2021-12-01 VITALS
RESPIRATION RATE: 12 BRPM | HEIGHT: 69 IN | TEMPERATURE: 98.1 F | WEIGHT: 156 LBS | BODY MASS INDEX: 23.11 KG/M2 | DIASTOLIC BLOOD PRESSURE: 50 MMHG | OXYGEN SATURATION: 96 % | HEART RATE: 86 BPM | SYSTOLIC BLOOD PRESSURE: 96 MMHG

## 2021-12-01 DIAGNOSIS — Z78.0 POSTMENOPAUSAL: ICD-10-CM

## 2021-12-01 DIAGNOSIS — M81.0 AGE-RELATED OSTEOPOROSIS WITHOUT CURRENT PATHOLOGICAL FRACTURE: ICD-10-CM

## 2021-12-01 PROCEDURE — 99213 OFFICE O/P EST LOW 20 MIN: CPT | Performed by: FAMILY MEDICINE

## 2021-12-01 ASSESSMENT — FIBROSIS 4 INDEX: FIB4 SCORE: 2.09

## 2021-12-02 NOTE — PATIENT INSTRUCTIONS
Alendronate weekly tablets  What is this medicine?  ALENDRONATE (a CHIARA droe reny) slows calcium loss from bones. It helps to make healthy bone and to slow bone loss in people with osteoporosis. It may be used to treat Paget's disease.  This medicine may be used for other purposes; ask your health care provider or pharmacist if you have questions.  COMMON BRAND NAME(S): Fosamax  What should I tell my health care provider before I take this medicine?  They need to know if you have any of these conditions:  · esophagus, stomach, or intestine problems, like acid-reflux or GERD  · dental disease  · kidney disease  · low blood calcium  · low vitamin D  · problems swallowing  · problems sitting or standing for 30 minutes  · an unusual or allergic reaction to alendronate, other medicines, foods, dyes, or preservatives  · pregnant or trying to get pregnant  · breast-feeding  How should I use this medicine?  You must take this medicine exactly as directed or you will lower the amount of medicine you absorb into your body or you may cause yourself harm. Take your dose by mouth first thing in the morning, after you are up for the day. Do not eat or drink anything before you take this medicine. Swallow your medicine with a full glass (6 to 8 fluid ounces) of plain water. Do not take this tablet with any other drink. Do not chew or crush the tablet. After taking this medicine, do not eat breakfast, drink, or take any medicines or vitamins for at least 30 minutes. Stand or sit up for at least 30 minutes after you take this medicine; do not lie down. Take this medicine on the same day every week. Do not take your medicine more often than directed.  Talk to your pediatrician regarding the use of this medicine in children. Special care may be needed.  Overdosage: If you think you have taken too much of this medicine contact a poison control center or emergency room at once.  NOTE: This medicine is only for you. Do not share this  medicine with others.  What if I miss a dose?  If you miss a dose, take the dose on the morning after you remember. Then take your next dose on your regular day of the week. Never take 2 tablets on the same day. Do not take double or extra doses.  What may interact with this medicine?  · aluminum hydroxide  · antacids  · aspirin  · calcium supplements  · drugs for inflammation like ibuprofen, naproxen, and others  · iron supplements  · magnesium supplements  · vitamins with minerals  This list may not describe all possible interactions. Give your health care provider a list of all the medicines, herbs, non-prescription drugs, or dietary supplements you use. Also tell them if you smoke, drink alcohol, or use illegal drugs. Some items may interact with your medicine.  What should I watch for while using this medicine?  Visit your doctor or health care professional for regular checks ups. It may be some time before you see benefit from this medicine. Do not stop taking your medication except on your doctor's advice. Your doctor or health care professional may order blood tests and other tests to see how you are doing.  You should make sure you get enough calcium and vitamin D while you are taking this medicine, unless your doctor tells you not to. Discuss the foods you eat and the vitamins you take with your health care professional.  Some people who take this medicine have severe bone, joint, and/or muscle pain. This medicine may also increase your risk for a broken thigh bone. Tell your doctor right away if you have pain in your upper leg or groin. Tell your doctor if you have any pain that does not go away or that gets worse.  This medicine can make you more sensitive to the sun. If you get a rash while taking this medicine, sunlight may cause the rash to get worse. Keep out of the sun. If you cannot avoid being in the sun, wear protective clothing and use sunscreen. Do not use sun lamps or tanning  beds/booths.  What side effects may I notice from receiving this medicine?  Side effects that you should report to your doctor or health care professional as soon as possible:  · allergic reactions like skin rash, itching or hives, swelling of the face, lips, or tongue  · black or tarry stools  · bone, muscle or joint pain  · changes in vision  · chest pain  · heartburn or stomach pain  · jaw pain, especially after dental work  · pain or trouble when swallowing  · redness, blistering, peeling or loosening of the skin, including inside the mouth  Side effects that usually do not require medical attention (report to your doctor or health care professional if they continue or are bothersome):  · changes in taste  · diarrhea or constipation  · eye pain or itching  · headache  · nausea or vomiting  · stomach gas or fullness  This list may not describe all possible side effects. Call your doctor for medical advice about side effects. You may report side effects to FDA at 6-006-FDA-3775.  Where should I keep my medicine?  Keep out of the reach of children.  Store at room temperature of 15 and 30 degrees C (59 and 86 degrees F). Throw away any unused medicine after the expiration date.  NOTE: This sheet is a summary. It may not cover all possible information. If you have questions about this medicine, talk to your doctor, pharmacist, or health care provider.  © 2020 [employer]/Gold Standard (2012-11-01 09:02:42)  Zoledronic Acid injection (Paget's Disease, Osteoporosis)  What is this medicine?  ZOLEDRONIC ACID (SAMANTHA stephanie cespedes ik AS id) lowers the amount of calcium loss from bone. It is used to treat Paget's disease and osteoporosis in women.  This medicine may be used for other purposes; ask your health care provider or pharmacist if you have questions.  COMMON BRAND NAME(S): Reclast, Zometa  What should I tell my health care provider before I take this medicine?  They need to know if you have any of these  conditions:  · aspirin-sensitive asthma  · cancer, especially if you are receiving medicines used to treat cancer  · dental disease or wear dentures  · infection  · kidney disease  · low levels of calcium in the blood  · past surgery on the parathyroid gland or intestines  · receiving corticosteroids like dexamethasone or prednisone  · an unusual or allergic reaction to zoledronic acid, other medicines, foods, dyes, or preservatives  · pregnant or trying to get pregnant  · breast-feeding  How should I use this medicine?  This medicine is for infusion into a vein. It is given by a health care professional in a hospital or clinic setting.  Talk to your pediatrician regarding the use of this medicine in children. This medicine is not approved for use in children.  Overdosage: If you think you have taken too much of this medicine contact a poison control center or emergency room at once.  NOTE: This medicine is only for you. Do not share this medicine with others.  What if I miss a dose?  It is important not to miss your dose. Call your doctor or health care professional if you are unable to keep an appointment.  What may interact with this medicine?  · certain antibiotics given by injection  · NSAIDs, medicines for pain and inflammation, like ibuprofen or naproxen  · some diuretics like bumetanide, furosemide  · teriparatide  This list may not describe all possible interactions. Give your health care provider a list of all the medicines, herbs, non-prescription drugs, or dietary supplements you use. Also tell them if you smoke, drink alcohol, or use illegal drugs. Some items may interact with your medicine.  What should I watch for while using this medicine?  Visit your doctor or health care professional for regular checkups. It may be some time before you see the benefit from this medicine. Do not stop taking your medicine unless your doctor tells you to. Your doctor may order blood tests or other tests to see how you  are doing.  Women should inform their doctor if they wish to become pregnant or think they might be pregnant. There is a potential for serious side effects to an unborn child. Talk to your health care professional or pharmacist for more information.  You should make sure that you get enough calcium and vitamin D while you are taking this medicine. Discuss the foods you eat and the vitamins you take with your health care professional.  Some people who take this medicine have severe bone, joint, and/or muscle pain. This medicine may also increase your risk for jaw problems or a broken thigh bone. Tell your doctor right away if you have severe pain in your jaw, bones, joints, or muscles. Tell your doctor if you have any pain that does not go away or that gets worse.  Tell your dentist and dental surgeon that you are taking this medicine. You should not have major dental surgery while on this medicine. See your dentist to have a dental exam and fix any dental problems before starting this medicine. Take good care of your teeth while on this medicine. Make sure you see your dentist for regular follow-up appointments.  What side effects may I notice from receiving this medicine?  Side effects that you should report to your doctor or health care professional as soon as possible:  · allergic reactions like skin rash, itching or hives, swelling of the face, lips, or tongue  · anxiety, confusion, or depression  · breathing problems  · changes in vision  · eye pain  · feeling faint or lightheaded, falls  · jaw pain, especially after dental work  · mouth sores  · muscle cramps, stiffness, or weakness  · redness, blistering, peeling or loosening of the skin, including inside the mouth  · trouble passing urine or change in the amount of urine  Side effects that usually do not require medical attention (report to your doctor or health care professional if they continue or are bothersome):  · bone, joint, or muscle  pain  · constipation  · diarrhea  · fever  · hair loss  · irritation at site where injected  · loss of appetite  · nausea, vomiting  · stomach upset  · trouble sleeping  · trouble swallowing  · weak or tired  This list may not describe all possible side effects. Call your doctor for medical advice about side effects. You may report side effects to FDA at 4-154-GZK-4100.  Where should I keep my medicine?  This drug is given in a hospital or clinic and will not be stored at home.  NOTE: This sheet is a summary. It may not cover all possible information. If you have questions about this medicine, talk to your doctor, pharmacist, or health care provider.  © 2020 [email or web address] Standard (2015-05-16 14:19:57)  Denosumab injection  What is this medicine?  DENOSUMAB (den oh hiram mab) slows bone breakdown. Prolia is used to treat osteoporosis in women after menopause and in men, and in people who are taking corticosteroids for 6 months or more. Xgeva is used to treat a high calcium level due to cancer and to prevent bone fractures and other bone problems caused by multiple myeloma or cancer bone metastases. Xgeva is also used to treat giant cell tumor of the bone.  This medicine may be used for other purposes; ask your health care provider or pharmacist if you have questions.  COMMON BRAND NAME(S): Prolia, XGEVA  What should I tell my health care provider before I take this medicine?  They need to know if you have any of these conditions:  · dental disease  · having surgery or tooth extraction  · infection  · kidney disease  · low levels of calcium or Vitamin D in the blood  · malnutrition  · on hemodialysis  · skin conditions or sensitivity  · thyroid or parathyroid disease  · an unusual reaction to denosumab, other medicines, foods, dyes, or preservatives  · pregnant or trying to get pregnant  · breast-feeding  How should I use this medicine?  This medicine is for injection under the skin. It is given by a health care  professional in a hospital or clinic setting.  A special MedGuide will be given to you before each treatment. Be sure to read this information carefully each time.  For Prolia, talk to your pediatrician regarding the use of this medicine in children. Special care may be needed. For Xgeva, talk to your pediatrician regarding the use of this medicine in children. While this drug may be prescribed for children as young as 13 years for selected conditions, precautions do apply.  Overdosage: If you think you have taken too much of this medicine contact a poison control center or emergency room at once.  NOTE: This medicine is only for you. Do not share this medicine with others.  What if I miss a dose?  It is important not to miss your dose. Call your doctor or health care professional if you are unable to keep an appointment.  What may interact with this medicine?  Do not take this medicine with any of the following medications:  · other medicines containing denosumab  This medicine may also interact with the following medications:  · medicines that lower your chance of fighting infection  · steroid medicines like prednisone or cortisone  This list may not describe all possible interactions. Give your health care provider a list of all the medicines, herbs, non-prescription drugs, or dietary supplements you use. Also tell them if you smoke, drink alcohol, or use illegal drugs. Some items may interact with your medicine.  What should I watch for while using this medicine?  Visit your doctor or health care professional for regular checks on your progress. Your doctor or health care professional may order blood tests and other tests to see how you are doing.  Call your doctor or health care professional for advice if you get a fever, chills or sore throat, or other symptoms of a cold or flu. Do not treat yourself. This drug may decrease your body's ability to fight infection. Try to avoid being around people who are  sick.  You should make sure you get enough calcium and vitamin D while you are taking this medicine, unless your doctor tells you not to. Discuss the foods you eat and the vitamins you take with your health care professional.  See your dentist regularly. Brush and floss your teeth as directed. Before you have any dental work done, tell your dentist you are receiving this medicine.  Do not become pregnant while taking this medicine or for 5 months after stopping it. Talk with your doctor or health care professional about your birth control options while taking this medicine. Women should inform their doctor if they wish to become pregnant or think they might be pregnant. There is a potential for serious side effects to an unborn child. Talk to your health care professional or pharmacist for more information.  What side effects may I notice from receiving this medicine?  Side effects that you should report to your doctor or health care professional as soon as possible:  · allergic reactions like skin rash, itching or hives, swelling of the face, lips, or tongue  · bone pain  · breathing problems  · dizziness  · jaw pain, especially after dental work  · redness, blistering, peeling of the skin  · signs and symptoms of infection like fever or chills; cough; sore throat; pain or trouble passing urine  · signs of low calcium like fast heartbeat, muscle cramps or muscle pain; pain, tingling, numbness in the hands or feet; seizures  · unusual bleeding or bruising  · unusually weak or tired  Side effects that usually do not require medical attention (report to your doctor or health care professional if they continue or are bothersome):  · constipation  · diarrhea  · headache  · joint pain  · loss of appetite  · muscle pain  · runny nose  · tiredness  · upset stomach  This list may not describe all possible side effects. Call your doctor for medical advice about side effects. You may report side effects to FDA at  1-800-FDA-1088.  Where should I keep my medicine?  This medicine is only given in a clinic, doctor's office, or other health care setting and will not be stored at home.  NOTE: This sheet is a summary. It may not cover all possible information. If you have questions about this medicine, talk to your doctor, pharmacist, or health care provider.  © 2020 [employer]/Gold Standard (2019-04-26 16:10:44)

## 2021-12-15 NOTE — PROGRESS NOTES
Subjective:     Chief Complaint   Patient presents with   • Other     Options to improve bone density       Mirian Jack is a 81 y.o. female here today for evaluation and management of:    Age-related osteoporosis without current pathological fracture  Patient's last bone density was in 2019 and showed the following:  The distal left forearm has a mean bone mineral density of 0.652 g/cm2, with a T score of -2.6 and a Z score of 0.0.     The proximal left femur has a mean bone mineral density of 0.921 g/cm2, with a T score of -0.7 and a Z score of 1.1.     When compared with the most recent study dated 10/18/2018, there has been a 0.6% decrease in the bone mineral density of the left forearm and a 3.9% decrease in the bone mineral density of the left femur.     IMPRESSION:  1.  According to the World Health Organization classification, bone mineral density of this patient is osteoporotic in the left forearm and osteopenic in the left proximal femur.  2.  Interval decrease in bone density  Patient is incredibly active and regularly does weightbearing exercise.       Allergies   Allergen Reactions   • Ibuprofen Unspecified     GI upset and sweating if she takes Advil or Aspirin on a regular basis.       Current medicines (including changes today)  Current Outpatient Medications   Medication Sig Dispense Refill   • Evolocumab, REPATHA, (REPATHA SURECLICK) 140 MG/ML Solution Auto-injector Inject 1 Each under the skin every 14 days. 2 mL 11   • amoxicillin (AMOXIL) 500 MG Cap TAKE 2 CAPSULES BY MOUTH 1 HOUR BEFORE APPOINTMENT THEN TAKE 1 CAPSULE BY MOUTH THREE TIMES DAILY UNTIL GONE     • magnesium gluconate (MAG-G) 500 MG tablet Take 500 mg by mouth 3 times a day.     • Probiotic Product (SOLUBLE FIBER/PROBIOTICS PO) Take  by mouth.     • ascorbic acid (ASCORBIC ACID) 500 MG Tab Take 500 mg by mouth every day.     • Turmeric 500 MG Tab Take  by mouth.     • calcium carbonate (RA CALCIUM) 500 MG Tab Take  by mouth.    "  • cyanocobalamin (VITAMIN B12) 1000 MCG Tab Take  by mouth.     • Cholecalciferol (VITAMIN D PO) Take 5,000 Units by mouth.     • Multiple Vitamin (MULTI-VITAMINS) Tab Take  by mouth.     • b complex vitamins tablet Take  by mouth.       No current facility-administered medications for this visit.       She  has a past medical history of Cancer (HCC), Cataract, High cholesterol, and Macular degeneration.    Patient Active Problem List    Diagnosis Date Noted   • Total knee replacement status 09/01/2021   • DAVENPORT (dyspnea on exertion) 02/08/2021   • Coronary artery calcification seen on CAT scan 02/08/2021   • Adjustment disorder with depressed mood 08/27/2020   • Macrocytosis without anemia 01/23/2020   • Hyperglycemia 01/23/2020   • Status post right hip replacement 12/05/2019   • Chronic pain of right knee 12/05/2019   • Rheumatoid factor positive 12/05/2019   • Age-related osteoporosis without current pathological fracture 12/05/2019   • Do not resuscitate status 12/05/2019   • Dyslipidemia 12/12/2017   • Renal dysfunction 12/12/2017   • Primary osteoarthritis 02/16/2017   • History of tubular adenoma of the colon    • Aortic atherosclerosis (HCC) 10/12/2016   • History of basal cell carcinoma 11/11/2015   • Hx of LASIK 04/25/2011   • Presence of right artificial knee joint 07/22/2009       ROS   No fever or chills.  No nausea or vomiting.  No chest pain or palpitations.  No cough or SOB.  No pain with urination or hematuria.  No black or bloody stools.       Objective:     BP (!) 96/50 (BP Location: Right arm, Patient Position: Sitting, BP Cuff Size: Adult)   Pulse 86   Temp 36.7 °C (98.1 °F)   Resp 12   Ht 1.753 m (5' 9\")   Wt 70.8 kg (156 lb)   SpO2 96%  Body mass index is 23.04 kg/m².   Physical Exam:  Well developed, well nourished.  Alert, oriented in no acute distress.  Eye contact is good, speech goal directed, affect calm  Eyes: conjunctiva non-injected, sclera non-icteric.  Neck Supple.  No " adenopathy or masses in the neck or supraclavicular regions. No thyromegaly  Lungs: clear to auscultation bilaterally with good excursion. No wheezes or rhonchi  CV: regular rate and rhythm. No murmur        Assessment and Plan:   The following treatment plan was discussed    1. Postmenopausal  Patient is due for repeat bone density.  We will look at progression or improvement.  Continue weightbearing exercise, calcium and vitamin D supplementation.  Await results.  - DS-BONE DENSITY STUDY (DEXA); Future    2. Age-related osteoporosis without current pathological fracture  Patient is due for repeat bone density.  We will look at progression or improvement.  Continue weightbearing exercise, calcium and vitamin D supplementation.  Await results.  - DS-BONE DENSITY STUDY (DEXA); Future    Any change or worsening of signs or symptoms, patient encouraged to follow-up or report to the emergency room for further evaluation. Patient understands and agrees.    Followup: Return in about 6 months (around 6/1/2022).

## 2021-12-15 NOTE — ASSESSMENT & PLAN NOTE
Patient's last bone density was in 2019 and showed the following:  The distal left forearm has a mean bone mineral density of 0.652 g/cm2, with a T score of -2.6 and a Z score of 0.0.     The proximal left femur has a mean bone mineral density of 0.921 g/cm2, with a T score of -0.7 and a Z score of 1.1.     When compared with the most recent study dated 10/18/2018, there has been a 0.6% decrease in the bone mineral density of the left forearm and a 3.9% decrease in the bone mineral density of the left femur.     IMPRESSION:  1.  According to the World Health Organization classification, bone mineral density of this patient is osteoporotic in the left forearm and osteopenic in the left proximal femur.  2.  Interval decrease in bone density  Patient is incredibly active and regularly does weightbearing exercise.

## 2021-12-22 ENCOUNTER — HOSPITAL ENCOUNTER (OUTPATIENT)
Dept: RADIOLOGY | Facility: MEDICAL CENTER | Age: 81
End: 2021-12-22
Attending: FAMILY MEDICINE
Payer: MEDICARE

## 2021-12-22 DIAGNOSIS — Z78.0 POSTMENOPAUSAL: ICD-10-CM

## 2021-12-22 DIAGNOSIS — M81.0 AGE-RELATED OSTEOPOROSIS WITHOUT CURRENT PATHOLOGICAL FRACTURE: ICD-10-CM

## 2021-12-22 PROCEDURE — 77080 DXA BONE DENSITY AXIAL: CPT

## 2021-12-23 ENCOUNTER — PATIENT MESSAGE (OUTPATIENT)
Dept: MEDICAL GROUP | Facility: LAB | Age: 81
End: 2021-12-23

## 2021-12-27 RX ORDER — ALENDRONATE SODIUM 70 MG/1
70 TABLET ORAL
Qty: 12 TABLET | Refills: 3 | Status: SHIPPED | OUTPATIENT
Start: 2021-12-27 | End: 2022-11-03

## 2022-01-07 DIAGNOSIS — E78.5 DYSLIPIDEMIA: ICD-10-CM

## 2022-01-07 DIAGNOSIS — Z79.899 HIGH RISK MEDICATION USE: ICD-10-CM

## 2022-02-14 ENCOUNTER — HOSPITAL ENCOUNTER (OUTPATIENT)
Dept: LAB | Facility: MEDICAL CENTER | Age: 82
End: 2022-02-14
Attending: PHYSICIAN ASSISTANT
Payer: MEDICARE

## 2022-02-14 DIAGNOSIS — E78.5 DYSLIPIDEMIA: ICD-10-CM

## 2022-02-14 DIAGNOSIS — Z79.899 HIGH RISK MEDICATION USE: ICD-10-CM

## 2022-02-14 LAB
ALBUMIN SERPL BCP-MCNC: 4.3 G/DL (ref 3.2–4.9)
ALBUMIN/GLOB SERPL: 2 G/DL
ALP SERPL-CCNC: 99 U/L (ref 30–99)
ALT SERPL-CCNC: 17 U/L (ref 2–50)
ANION GAP SERPL CALC-SCNC: 12 MMOL/L (ref 7–16)
AST SERPL-CCNC: 23 U/L (ref 12–45)
BILIRUB SERPL-MCNC: 0.6 MG/DL (ref 0.1–1.5)
BUN SERPL-MCNC: 16 MG/DL (ref 8–22)
CALCIUM SERPL-MCNC: 9.1 MG/DL (ref 8.5–10.5)
CHLORIDE SERPL-SCNC: 103 MMOL/L (ref 96–112)
CHOLEST SERPL-MCNC: 172 MG/DL (ref 100–199)
CO2 SERPL-SCNC: 24 MMOL/L (ref 20–33)
CREAT SERPL-MCNC: 0.74 MG/DL (ref 0.5–1.4)
FASTING STATUS PATIENT QL REPORTED: NORMAL
GLOBULIN SER CALC-MCNC: 2.2 G/DL (ref 1.9–3.5)
GLUCOSE SERPL-MCNC: 94 MG/DL (ref 65–99)
HDLC SERPL-MCNC: 87 MG/DL
LDLC SERPL CALC-MCNC: 71 MG/DL
POTASSIUM SERPL-SCNC: 4.3 MMOL/L (ref 3.6–5.5)
PROT SERPL-MCNC: 6.5 G/DL (ref 6–8.2)
SODIUM SERPL-SCNC: 139 MMOL/L (ref 135–145)
TRIGL SERPL-MCNC: 70 MG/DL (ref 0–149)

## 2022-02-14 PROCEDURE — 36415 COLL VENOUS BLD VENIPUNCTURE: CPT

## 2022-02-14 PROCEDURE — 80053 COMPREHEN METABOLIC PANEL: CPT

## 2022-02-14 PROCEDURE — 80061 LIPID PANEL: CPT

## 2022-03-16 ENCOUNTER — PRE-ADMISSION TESTING (OUTPATIENT)
Dept: ADMISSIONS | Facility: MEDICAL CENTER | Age: 82
End: 2022-03-16
Attending: PLASTIC SURGERY
Payer: MEDICARE

## 2022-03-16 DIAGNOSIS — Z01.812 PRE-OPERATIVE LABORATORY EXAMINATION: ICD-10-CM

## 2022-03-16 DIAGNOSIS — Z01.810 PRE-OPERATIVE CARDIOVASCULAR EXAMINATION: ICD-10-CM

## 2022-03-16 LAB
BASOPHILS # BLD AUTO: 0.5 % (ref 0–1.8)
BASOPHILS # BLD: 0.03 K/UL (ref 0–0.12)
EKG IMPRESSION: NORMAL
EOSINOPHIL # BLD AUTO: 0.04 K/UL (ref 0–0.51)
EOSINOPHIL NFR BLD: 0.7 % (ref 0–6.9)
ERYTHROCYTE [DISTWIDTH] IN BLOOD BY AUTOMATED COUNT: 47.3 FL (ref 35.9–50)
HCT VFR BLD AUTO: 44.1 % (ref 37–47)
HGB BLD-MCNC: 14.9 G/DL (ref 12–16)
IMM GRANULOCYTES # BLD AUTO: 0.03 K/UL (ref 0–0.11)
IMM GRANULOCYTES NFR BLD AUTO: 0.5 % (ref 0–0.9)
LYMPHOCYTES # BLD AUTO: 0.94 K/UL (ref 1–4.8)
LYMPHOCYTES NFR BLD: 15.6 % (ref 22–41)
MCH RBC QN AUTO: 35 PG (ref 27–33)
MCHC RBC AUTO-ENTMCNC: 33.8 G/DL (ref 33.6–35)
MCV RBC AUTO: 103.5 FL (ref 81.4–97.8)
MONOCYTES # BLD AUTO: 0.46 K/UL (ref 0–0.85)
MONOCYTES NFR BLD AUTO: 7.6 % (ref 0–13.4)
NEUTROPHILS # BLD AUTO: 4.54 K/UL (ref 2–7.15)
NEUTROPHILS NFR BLD: 75.1 % (ref 44–72)
NRBC # BLD AUTO: 0 K/UL
NRBC BLD-RTO: 0 /100 WBC
PLATELET # BLD AUTO: 291 K/UL (ref 164–446)
PMV BLD AUTO: 8.9 FL (ref 9–12.9)
RBC # BLD AUTO: 4.26 M/UL (ref 4.2–5.4)
WBC # BLD AUTO: 6 K/UL (ref 4.8–10.8)

## 2022-03-16 PROCEDURE — 85025 COMPLETE CBC W/AUTO DIFF WBC: CPT

## 2022-03-16 PROCEDURE — 36415 COLL VENOUS BLD VENIPUNCTURE: CPT

## 2022-03-16 PROCEDURE — 93005 ELECTROCARDIOGRAM TRACING: CPT

## 2022-03-16 PROCEDURE — 93010 ELECTROCARDIOGRAM REPORT: CPT | Performed by: INTERNAL MEDICINE

## 2022-03-16 ASSESSMENT — FIBROSIS 4 INDEX: FIB4 SCORE: 2.12

## 2022-03-16 NOTE — PREPROCEDURE INSTRUCTIONS
Patient given fasting instructions and instructed to stop vitamins/supplements/herbal medications/diet pills per anesthesia guidelines.  Patient understands all instructions and denies questions at this time.

## 2022-03-22 ENCOUNTER — PRE-ADMISSION TESTING (OUTPATIENT)
Dept: ADMISSIONS | Facility: MEDICAL CENTER | Age: 82
End: 2022-03-22
Attending: PLASTIC SURGERY
Payer: MEDICARE

## 2022-03-22 DIAGNOSIS — Z01.812 PRE-OPERATIVE LABORATORY EXAMINATION: ICD-10-CM

## 2022-03-22 LAB
COVID ORDER STATUS COVID19: NORMAL
SARS-COV-2 RNA RESP QL NAA+PROBE: NOTDETECTED
SPECIMEN SOURCE: NORMAL

## 2022-03-22 PROCEDURE — U0005 INFEC AGEN DETEC AMPLI PROBE: HCPCS

## 2022-03-22 PROCEDURE — U0003 INFECTIOUS AGENT DETECTION BY NUCLEIC ACID (DNA OR RNA); SEVERE ACUTE RESPIRATORY SYNDROME CORONAVIRUS 2 (SARS-COV-2) (CORONAVIRUS DISEASE [COVID-19]), AMPLIFIED PROBE TECHNIQUE, MAKING USE OF HIGH THROUGHPUT TECHNOLOGIES AS DESCRIBED BY CMS-2020-01-R: HCPCS

## 2022-03-23 ENCOUNTER — ANESTHESIA EVENT (OUTPATIENT)
Dept: SURGERY | Facility: MEDICAL CENTER | Age: 82
End: 2022-03-23
Payer: MEDICARE

## 2022-03-24 ENCOUNTER — ANESTHESIA (OUTPATIENT)
Dept: SURGERY | Facility: MEDICAL CENTER | Age: 82
End: 2022-03-24
Payer: MEDICARE

## 2022-03-24 ENCOUNTER — HOSPITAL ENCOUNTER (OUTPATIENT)
Facility: MEDICAL CENTER | Age: 82
End: 2022-03-24
Attending: PLASTIC SURGERY | Admitting: PLASTIC SURGERY
Payer: MEDICARE

## 2022-03-24 VITALS
SYSTOLIC BLOOD PRESSURE: 146 MMHG | OXYGEN SATURATION: 94 % | HEIGHT: 69 IN | BODY MASS INDEX: 23.12 KG/M2 | HEART RATE: 68 BPM | RESPIRATION RATE: 20 BRPM | TEMPERATURE: 97.6 F | WEIGHT: 156.09 LBS | DIASTOLIC BLOOD PRESSURE: 84 MMHG

## 2022-03-24 DIAGNOSIS — Z01.812 PRE-OPERATIVE LABORATORY EXAMINATION: ICD-10-CM

## 2022-03-24 PROBLEM — C44.311 BASAL CELL CARCINOMA OF SKIN OF NOSE: Status: ACTIVE | Noted: 2022-03-24

## 2022-03-24 PROCEDURE — A9270 NON-COVERED ITEM OR SERVICE: HCPCS | Performed by: ANESTHESIOLOGY

## 2022-03-24 PROCEDURE — 700101 HCHG RX REV CODE 250: Performed by: PLASTIC SURGERY

## 2022-03-24 PROCEDURE — 700111 HCHG RX REV CODE 636 W/ 250 OVERRIDE (IP): Performed by: ANESTHESIOLOGY

## 2022-03-24 PROCEDURE — 160009 HCHG ANES TIME/MIN: Performed by: PLASTIC SURGERY

## 2022-03-24 PROCEDURE — 160048 HCHG OR STATISTICAL LEVEL 1-5: Performed by: PLASTIC SURGERY

## 2022-03-24 PROCEDURE — 160046 HCHG PACU - 1ST 60 MINS PHASE II: Performed by: PLASTIC SURGERY

## 2022-03-24 PROCEDURE — 160042 HCHG SURGERY MINUTES - EA ADDL 1 MIN LEVEL 5: Performed by: PLASTIC SURGERY

## 2022-03-24 PROCEDURE — 700101 HCHG RX REV CODE 250: Performed by: ANESTHESIOLOGY

## 2022-03-24 PROCEDURE — 700102 HCHG RX REV CODE 250 W/ 637 OVERRIDE(OP): Performed by: ANESTHESIOLOGY

## 2022-03-24 PROCEDURE — 160002 HCHG RECOVERY MINUTES (STAT): Performed by: PLASTIC SURGERY

## 2022-03-24 PROCEDURE — 160025 RECOVERY II MINUTES (STATS): Performed by: PLASTIC SURGERY

## 2022-03-24 PROCEDURE — 160035 HCHG PACU - 1ST 60 MINS PHASE I: Performed by: PLASTIC SURGERY

## 2022-03-24 PROCEDURE — 700105 HCHG RX REV CODE 258: Performed by: PLASTIC SURGERY

## 2022-03-24 PROCEDURE — 160031 HCHG SURGERY MINUTES - 1ST 30 MINS LEVEL 5: Performed by: PLASTIC SURGERY

## 2022-03-24 PROCEDURE — 501838 HCHG SUTURE GENERAL: Performed by: PLASTIC SURGERY

## 2022-03-24 RX ORDER — DEXAMETHASONE SODIUM PHOSPHATE 4 MG/ML
INJECTION, SOLUTION INTRA-ARTICULAR; INTRALESIONAL; INTRAMUSCULAR; INTRAVENOUS; SOFT TISSUE PRN
Status: DISCONTINUED | OUTPATIENT
Start: 2022-03-24 | End: 2022-03-24 | Stop reason: SURG

## 2022-03-24 RX ORDER — SODIUM CHLORIDE, SODIUM LACTATE, POTASSIUM CHLORIDE, CALCIUM CHLORIDE 600; 310; 30; 20 MG/100ML; MG/100ML; MG/100ML; MG/100ML
INJECTION, SOLUTION INTRAVENOUS CONTINUOUS
Status: DISCONTINUED | OUTPATIENT
Start: 2022-03-24 | End: 2022-03-24 | Stop reason: HOSPADM

## 2022-03-24 RX ORDER — BUPIVACAINE HYDROCHLORIDE AND EPINEPHRINE 5; 5 MG/ML; UG/ML
INJECTION, SOLUTION EPIDURAL; INTRACAUDAL; PERINEURAL
Status: DISCONTINUED | OUTPATIENT
Start: 2022-03-24 | End: 2022-03-24 | Stop reason: HOSPADM

## 2022-03-24 RX ORDER — LIDOCAINE HYDROCHLORIDE 20 MG/ML
INJECTION, SOLUTION EPIDURAL; INFILTRATION; INTRACAUDAL; PERINEURAL PRN
Status: DISCONTINUED | OUTPATIENT
Start: 2022-03-24 | End: 2022-03-24 | Stop reason: SURG

## 2022-03-24 RX ORDER — ONDANSETRON 2 MG/ML
INJECTION INTRAMUSCULAR; INTRAVENOUS PRN
Status: DISCONTINUED | OUTPATIENT
Start: 2022-03-24 | End: 2022-03-24 | Stop reason: SURG

## 2022-03-24 RX ORDER — OXYCODONE HCL 5 MG/5 ML
5 SOLUTION, ORAL ORAL
Status: DISCONTINUED | OUTPATIENT
Start: 2022-03-24 | End: 2022-03-24 | Stop reason: HOSPADM

## 2022-03-24 RX ORDER — CEFAZOLIN SODIUM 1 G/3ML
INJECTION, POWDER, FOR SOLUTION INTRAMUSCULAR; INTRAVENOUS PRN
Status: DISCONTINUED | OUTPATIENT
Start: 2022-03-24 | End: 2022-03-24 | Stop reason: SURG

## 2022-03-24 RX ORDER — BUPIVACAINE HYDROCHLORIDE AND EPINEPHRINE 5; 5 MG/ML; UG/ML
INJECTION, SOLUTION EPIDURAL; INTRACAUDAL; PERINEURAL
Status: DISCONTINUED
Start: 2022-03-24 | End: 2022-03-24 | Stop reason: HOSPADM

## 2022-03-24 RX ORDER — OXYCODONE HCL 5 MG/5 ML
10 SOLUTION, ORAL ORAL
Status: DISCONTINUED | OUTPATIENT
Start: 2022-03-24 | End: 2022-03-24 | Stop reason: HOSPADM

## 2022-03-24 RX ORDER — LABETALOL HYDROCHLORIDE 5 MG/ML
5 INJECTION, SOLUTION INTRAVENOUS
Status: DISCONTINUED | OUTPATIENT
Start: 2022-03-24 | End: 2022-03-24 | Stop reason: HOSPADM

## 2022-03-24 RX ORDER — HYDRALAZINE HYDROCHLORIDE 20 MG/ML
5 INJECTION INTRAMUSCULAR; INTRAVENOUS
Status: DISCONTINUED | OUTPATIENT
Start: 2022-03-24 | End: 2022-03-24 | Stop reason: HOSPADM

## 2022-03-24 RX ORDER — SODIUM CHLORIDE, SODIUM LACTATE, POTASSIUM CHLORIDE, CALCIUM CHLORIDE 600; 310; 30; 20 MG/100ML; MG/100ML; MG/100ML; MG/100ML
INJECTION, SOLUTION INTRAVENOUS CONTINUOUS
Status: ACTIVE | OUTPATIENT
Start: 2022-03-24 | End: 2022-03-24

## 2022-03-24 RX ORDER — LIDOCAINE HYDROCHLORIDE 20 MG/ML
JELLY TOPICAL PRN
Status: DISCONTINUED | OUTPATIENT
Start: 2022-03-24 | End: 2022-03-24 | Stop reason: SURG

## 2022-03-24 RX ORDER — DIPHENHYDRAMINE HYDROCHLORIDE 50 MG/ML
12.5 INJECTION INTRAMUSCULAR; INTRAVENOUS
Status: DISCONTINUED | OUTPATIENT
Start: 2022-03-24 | End: 2022-03-24 | Stop reason: HOSPADM

## 2022-03-24 RX ORDER — HALOPERIDOL 5 MG/ML
1 INJECTION INTRAMUSCULAR
Status: DISCONTINUED | OUTPATIENT
Start: 2022-03-24 | End: 2022-03-24 | Stop reason: HOSPADM

## 2022-03-24 RX ORDER — ONDANSETRON 2 MG/ML
4 INJECTION INTRAMUSCULAR; INTRAVENOUS
Status: DISCONTINUED | OUTPATIENT
Start: 2022-03-24 | End: 2022-03-24 | Stop reason: HOSPADM

## 2022-03-24 RX ORDER — ACETAMINOPHEN 500 MG
500 TABLET ORAL ONCE
Status: COMPLETED | OUTPATIENT
Start: 2022-03-24 | End: 2022-03-24

## 2022-03-24 RX ADMIN — EPHEDRINE SULFATE 10 MG: 50 INJECTION, SOLUTION INTRAVENOUS at 16:47

## 2022-03-24 RX ADMIN — FENTANYL CITRATE 50 MCG: 50 INJECTION, SOLUTION INTRAMUSCULAR; INTRAVENOUS at 16:28

## 2022-03-24 RX ADMIN — LIDOCAINE HYDROCHLORIDE 100 MG: 20 INJECTION, SOLUTION EPIDURAL; INFILTRATION; INTRACAUDAL at 16:29

## 2022-03-24 RX ADMIN — DEXAMETHASONE SODIUM PHOSPHATE 4 MG: 4 INJECTION, SOLUTION INTRA-ARTICULAR; INTRALESIONAL; INTRAMUSCULAR; INTRAVENOUS; SOFT TISSUE at 16:35

## 2022-03-24 RX ADMIN — ACETAMINOPHEN 500 MG: 500 TABLET ORAL at 14:33

## 2022-03-24 RX ADMIN — ONDANSETRON 4 MG: 2 INJECTION INTRAMUSCULAR; INTRAVENOUS at 16:35

## 2022-03-24 RX ADMIN — PROPOFOL 100 MG: 10 INJECTION, EMULSION INTRAVENOUS at 16:29

## 2022-03-24 RX ADMIN — FENTANYL CITRATE 50 MCG: 50 INJECTION, SOLUTION INTRAMUSCULAR; INTRAVENOUS at 16:58

## 2022-03-24 RX ADMIN — PROPOFOL 50 MG: 10 INJECTION, EMULSION INTRAVENOUS at 16:30

## 2022-03-24 RX ADMIN — LIDOCAINE HYDROCHLORIDE 3 ML: 20 JELLY TOPICAL at 16:30

## 2022-03-24 RX ADMIN — SODIUM CHLORIDE, POTASSIUM CHLORIDE, SODIUM LACTATE AND CALCIUM CHLORIDE: 600; 310; 30; 20 INJECTION, SOLUTION INTRAVENOUS at 14:33

## 2022-03-24 RX ADMIN — CEFAZOLIN 2 G: 330 INJECTION, POWDER, FOR SOLUTION INTRAMUSCULAR; INTRAVENOUS at 16:31

## 2022-03-24 ASSESSMENT — FIBROSIS 4 INDEX: FIB4 SCORE: 1.53

## 2022-03-24 ASSESSMENT — PAIN SCALES - GENERAL: PAIN_LEVEL: 0

## 2022-03-24 NOTE — ANESTHESIA PREPROCEDURE EVALUATION
Case: [id number] Date/Time: 03/24/22 8346    Procedures:       APPLICATION, GRAFT, SKIN, FULL-THICKNESS - FOR CLOSURE OF NASAL DEFECT VERSUS      FLAP GRAFT - VERSUS FOREHEAD FLAP      AUTOGRAFT,CARTILAGE,EAR,TO NOSE OR EAR - POSSIBLE    Pre-op diagnosis: PERSONAL HISTORY OF MALIGNANT NEOPLASM OF SKIN, BASAL CELL CARCINOMA OF NOSE    Location: [facility] ROOM 28 / SURGERY SAME DAY [facility]    Surgeons: Brian Ellis M.D.          Relevant Problems   NEURO   (positive) History of basal cell carcinoma      CARDIAC   (positive) Aortic atherosclerosis (HCC)   (positive) Coronary artery calcification seen on CAT scan      Other   (positive) Renal dysfunction   (positive) Rheumatoid factor positive       Physical Exam    Airway   Mallampati: II  TM distance: >3 FB  Neck ROM: full       Cardiovascular - normal exam  Rhythm: regular  Rate: normal  (-) murmur     Dental - normal exam           Pulmonary - normal exam  Breath sounds clear to auscultation     Abdominal    Neurological - normal exam                 Anesthesia Plan    ASA 2       Plan - general       Airway plan will be LMA          Induction: intravenous    Postoperative Plan: Postoperative administration of opioids is intended.    Pertinent diagnostic labs and testing reviewed    Informed Consent:    Anesthetic plan and risks discussed with patient.    Use of blood products discussed with: patient whom consented to blood products.

## 2022-03-24 NOTE — OR SURGEON
Immediate Post OP Note    PreOp Diagnosis: right nasal defect s/p mohs excision of BCC      PostOp Diagnosis: same      Procedure(s):  APPLICATION, GRAFT, SKIN, FULL-THICKNESS - FOR CLOSURE OF NASAL DEFECT VERSUS - Wound Class: Clean Contaminated  FLAP GRAFT - VERSUS FOREHEAD FLAP  AUTOGRAFT,CARTILAGE,EAR,TO NOSE OR EAR - POSSIBLE    Surgeon(s):  Brian Ellis M.D.    Anesthesiologist/Type of Anesthesia:  Anesthesiologist: Bang Schulz M.D./* No anesthesia type entered *    Surgical Staff:  Circulator: Maria Fernanda Tripp R.N.; Camille Reyes R.N.  Scrub Person: Luiz Stearns    Specimens removed if any:  * No specimens in log *    Estimated Blood Loss: minimal    Findings: see operative note    Complications: no apparent    #[id number]        3/24/2022 4:17 PM Brian Ellis M.D.

## 2022-03-24 NOTE — ANESTHESIA PROCEDURE NOTES
Airway    Date/Time: 3/24/2022 4:30 PM  Performed by: Bang Schulz M.D.  Authorized by: Bang Schulz M.D.     Location:  OR  Urgency:  Elective  Difficult Airway: No    Indications for Airway Management:  Anesthesia      Spontaneous Ventilation: absent    Sedation Level:  Deep  Preoxygenated: Yes    Mask Difficulty Assessment:  0 - not attempted  Final Airway Type:  Supraglottic airway  Final Supraglottic Airway:  Standard LMA    SGA Size:  4  Number of Attempts at Approach:  1

## 2022-03-25 NOTE — ANESTHESIA POSTPROCEDURE EVALUATION
Patient: Mirian Hairston Bykellen    Procedure Summary     Date: 03/24/22 Room / Location: [facility] ROOM 28 / SURGERY SAME DAY [facility]    Anesthesia Start: 1624 Anesthesia Stop: 1708    Procedure: FLAP- LOCAL ROTATIONAL (Nose) Diagnosis: (PERSONAL HISTORY OF MALIGNANT NEOPLASM OF SKIN, BASAL CELL CARCINOMA OF NOSE)    Surgeons: Brian Ellis M.D. Responsible Provider: Bang Schulz M.D.    Anesthesia Type: general ASA Status: 2          Final Anesthesia Type: general  Last vitals  BP   Blood Pressure : 134/62    Temp   36.1 °C (97 °F)    Pulse   74   Resp   17    SpO2   99 %      Anesthesia Post Evaluation    Patient location during evaluation: PACU  Patient participation: complete - patient participated  Level of consciousness: awake and alert  Pain score: 0    Airway patency: patent  Anesthetic complications: no  Cardiovascular status: hemodynamically stable  Respiratory status: acceptable  Hydration status: euvolemic    PONV: none          No complications documented.     Nurse Pain Score: 2 (NPRS)

## 2022-03-25 NOTE — OR NURSING
1706 - Patient arrived from OR, report received from anesthesia and RN. [id number] in place, patient awake and alert. Denies pain.   Facial dressing clean/dry/intact w/ xeroform, 4x4, and medipore tape     1726 - Patient tolerating liquids.   Patient spouse, Blaise updated via telephone. All questions answered     1754 - All criteria met to transition patient to phase II recovery     1821 - Discharge instructions given to patient and patient spouse, Blaise.   All discharge criteria met, IV removed.   Patient escorted out by staff in stable condition w/ all belongings.   Patient discharged

## 2022-03-25 NOTE — ANESTHESIA TIME REPORT
Anesthesia Start and Stop Event Times     Date Time Event    3/24/2022 1606 Ready for Procedure     1624 Anesthesia Start     1708 Anesthesia Stop        Responsible Staff  03/24/22    Name Role Begin End    Bang Schulz M.D. Anesth 1624 1708        Preop Diagnosis (Free Text):  Pre-op Diagnosis     PERSONAL HISTORY OF MALIGNANT NEOPLASM OF SKIN, BASAL CELL CARCINOMA OF NOSE        Preop Diagnosis (Codes):    Premium Reason  A. 3PM - 7AM    Comments:

## 2022-03-25 NOTE — OP REPORT
DATE OF SERVICE:  03/24/2022     PREOPERATIVE DIAGNOSIS:  Right nasal tip defect, status post Mohs excision of   a basal cell carcinoma.     POSTOPERATIVE DIAGNOSIS:  Right nasal tip defect, status post Mohs excision of   a basal cell carcinoma.     PROCEDURE:  Reconstruction of right nasal tip defect with a local tissue   rearrangement, 20 cm2.     ATTENDING SURGEON:  Brian Ellis MD     ANESTHESIOLOGIST:  Bang Schulz MD     ANESTHESIA TYPE:  General.     SPECIMENS:  None.     ESTIMATED BLOOD LOSS:  Minimal.     COMPLICATIONS:  No apparent.     INDICATIONS FOR PROCEDURE:  The patient is an 81-year-old woman who had a   basal cell carcinoma on her right nasal tip.  The patient underwent Mohs   excision and has been left with a defect that measures 1.6x1.8 cm.    Clinically, her nose is very long and she has a drooping nasal tip.  She also   has baseline tip deviation to the left hand side.  After evaluating the   defect, I did discuss doing local tissue rearrangement for closure.  I had   discussed doing a paramedian forehead flap, but I do not feel this is needed.    She also does not have missing cartilage and so we do not need to harvest   cartilage from her ear.     PROCEDURE:  After the operative and nonoperative options were discussed   including risks, benefits and alternatives, which included, but was not   limited to bleeding, infection, damage to surrounding structure, need for   further surgery, reaction to anesthetic agent, scarring, nasal asymmetry,   wound healing difficulties, hypertrophic keloid scarring, nasal airway   obstruction, dissatisfaction with appearance, need for revisional surgery,   deep venous thrombosis, pulmonary embolus, myocardial infarction, stroke,   unsatisfactory result and/or death, informed consent was obtained.    Preoperatively, the patient was identified.  The defect was inspected.  I did   feel that local tissue rearrangement could be used for closure of the  defect.    Antibiotics were given.  Sequential compression devices were placed.  The   patient was brought back to the operating room and general anesthesia was   induced.  Her nose was then widely injected with 0.5% Marcaine with   epinephrine.  The nose was then prepped and draped in the usual sterile   fashion.  Adequate time was allowed for hemostatic effects of the local   anesthetic to take effect.  Extensive undermining was then performed on the   entire nose in a subperichondrial fashion, degloving the entire nose along the   alar rim, nasal tip, nasal dorsum and up into the glabella.  After doing   this, a dorsal nasal flap was then drawn out.  The flap was then based on the   right nasal sidewall.  The flap was then incised with a 15 blade.  The flap   was then rotated into position.  The donor site up into the glabella was then   closed with a 5-0 Vicryl suture in the deep tissue.  Once the flap was then   rotated and placed, a few key sutures were then placed in the deep dermis with   a 5-0 Vicryl suture.  Then, the flap was then trimmed and any areas of   contour irregularity were then trimmed.  The flap was then sewn into place   with interrupted 5-0 nylon sutures.  Xeroform gauze and a compressive dressing   was then placed.  The patient was then awakened, extubated and transferred to   PACU in stable condition.  At the end of the procedure, all sponge,   instrument and needle counts were correct.        ______________________________  MD JACINTO CARLOS/TONO    DD:  03/24/2022 17:08  DT:  03/24/2022 17:21    Job#:  [id number]    CC:NAHUN BARRERA MD

## 2022-06-20 ENCOUNTER — TELEMEDICINE (OUTPATIENT)
Dept: MEDICAL GROUP | Facility: LAB | Age: 82
End: 2022-06-20
Payer: MEDICARE

## 2022-06-20 VITALS — WEIGHT: 150 LBS | HEIGHT: 69 IN | BODY MASS INDEX: 22.22 KG/M2

## 2022-06-20 DIAGNOSIS — U07.1 COVID-19: ICD-10-CM

## 2022-06-20 PROCEDURE — 99213 OFFICE O/P EST LOW 20 MIN: CPT | Mod: 95 | Performed by: PHYSICIAN ASSISTANT

## 2022-06-20 ASSESSMENT — FIBROSIS 4 INDEX: FIB4 SCORE: 1.53

## 2022-06-20 NOTE — PROGRESS NOTES
"Subjective:     CC: covid-19    HPI:   Mirian here today with   Exposure: recent air travel  Date of Exposure: 06/15/2022  Symptoms: started 06/17/2022  Cough, sneezing, sinus pressure  No fever, N/V/D, no chest pain    Positive test: 06/19/2022  Vaccinated? UTD    OTC treatments: multivitamin        ROS:  ROS neg except as indicated above    Current Outpatient Medications Ordered in Epic   Medication Sig Dispense Refill   • Nirmatrelvir & Ritonavir 20 x 150 MG & 10 x 100MG Tablet Therapy Pack Take 300 mg nirmatrelvir (two 150 mg tablets) with 100 mg ritonavir (one 100 mg tablet) by mouth, with all three tablets taken together twice daily for 5 days. 30 Each 0   • Ferrous Sulfate (IRON PO) Take 1 Tablet by mouth every day.     • alendronate (FOSAMAX) 70 MG Tab Take 1 Tablet by mouth every 7 days. 12 Tablet 3   • Evolocumab, REPATHA, (REPATHA SURECLICK) 140 MG/ML Solution Auto-injector Inject 1 Each under the skin every 14 days. 2 mL 11   • Probiotic Product (SOLUBLE FIBER/PROBIOTICS PO) Take 1 Tablet by mouth every day.     • ascorbic acid (ASCORBIC ACID) 500 MG Tab Take 500 mg by mouth every day.     • Turmeric 500 MG Tab Take 1 Tablet by mouth every day.     • calcium carbonate (OS-DEANN 500) 500 MG Tab Take 500 mg by mouth every morning with breakfast.     • Cholecalciferol (VITAMIN D PO) Take 5,000 Units by mouth.     • Multiple Vitamin (MULTI-VITAMINS) Tab Take 1 Tablet by mouth every day.     • b complex vitamins tablet Take 1 Tablet by mouth every day.       No current [facility]-ordered facility-administered medications on file.         Objective:     Exam:  Ht 1.753 m (5' 9\")   Wt 68 kg (150 lb)   LMP  (LMP Unknown)   BMI 22.15 kg/m²  Body mass index is 22.15 kg/m².    Constitutional: Alert, no distress, well-groomed.  Skin: Warm, dry, good turgor, no rashes in visible areas.  Eye: Equal, round and reactive, conjunctiva clear, lids normal.  ENMT: Lips without lesions, good dentition, moist mucous " membranes.  Neck: Trachea midline, no masses, no thyromegaly.  Respiratory: Unlabored respiratory effort, no cough.  MSK: Normal gait, moves all extremities.  Neuro: Grossly non-focal.   Psych: Alert and oriented x3, normal affect and mood.            Assessment & Plan:     81 y.o. female with the following -     1. COVID-19  - Nirmatrelvir & Ritonavir 20 x 150 MG & 10 x 100MG Tablet Therapy Pack; Take 300 mg nirmatrelvir (two 150 mg tablets) with 100 mg ritonavir (one 100 mg tablet) by mouth, with all three tablets taken together twice daily for 5 days.  Dispense: 30 Each; Refill: 0  Counseling/Patient Education:--   Symptomatic: Isolation until 10 days from symptom onset AND 24 hours after resolution of fever (without antipyretic) AND improvement of symptoms; close contacts should also quarantine x 10 days  Reviewed expected course/prognosis of COVID-19-- Advised wearing a mask in public-- Encouraged hand hygiene  Continue OTC treatments   Red Flags discussed. I conducted the encounter wearing the following PPE: N95, face shield, gloves, The patient was wearing a mask.      I spent a total of 15 minutes with record review, exam, communication with the patient, communication with other providers, and documentation of this encounter.      No follow-ups on file.    Please note that this dictation was created using voice recognition software. I have made every reasonable attempt to correct obvious errors, but there may be errors of grammar and possibly content that I did not discover before finalizing the note.

## 2022-08-01 ENCOUNTER — HOSPITAL ENCOUNTER (OUTPATIENT)
Dept: RADIOLOGY | Facility: MEDICAL CENTER | Age: 82
End: 2022-08-01
Attending: FAMILY MEDICINE
Payer: MEDICARE

## 2022-08-01 DIAGNOSIS — Z12.31 VISIT FOR SCREENING MAMMOGRAM: ICD-10-CM

## 2022-08-01 PROCEDURE — 77063 BREAST TOMOSYNTHESIS BI: CPT

## 2022-08-24 ENCOUNTER — PATIENT MESSAGE (OUTPATIENT)
Dept: MEDICAL GROUP | Facility: LAB | Age: 82
End: 2022-08-24
Payer: MEDICARE

## 2022-08-26 ENCOUNTER — PATIENT MESSAGE (OUTPATIENT)
Dept: MEDICAL GROUP | Facility: LAB | Age: 82
End: 2022-08-26
Payer: MEDICARE

## 2022-08-26 DIAGNOSIS — R92.30 DENSE BREAST TISSUE ON MAMMOGRAM: ICD-10-CM

## 2022-10-31 NOTE — PROGRESS NOTES
Chief Complaint   Patient presents with    Dyslipidemia     Follow up             Subjective:   Mirian Jack is a 81 y.o. female who presents today for follow-up.    Patient of Dr. Gee.  Current medical problems include coronary artery calcifications on CT, HLD, statin intolerance.     Mirian continues to do well.  She remains active by playing tennis and she intends to ski this year.  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  She has no exertional chest pressure, dyspnea, jaw or arm pain.    She is tolerating the Repatha injections without any side effects.  She is happy to stay on it.    She is concerned about her bone density and is wondering when her next DEXA scan will be.  She does follow-up with her primary care the next week although has been having difficulty maintaining consistent primary care.    Past Medical History:   Diagnosis Date    Cancer (HCC)     basal cell and squamous cell     Cataract     Bilat IOLs    High cholesterol 03/16/2022    Macular degeneration     Osteoarthritis 03/16/2022         Past Surgical History:   Procedure Laterality Date    FLAP GRAFT  3/24/2022    Procedure: FLAP- LOCAL ROTATIONAL;  Surgeon: Brian Ellis M.D.;  Location: SURGERY SAME DAY [facility];  Service: Plastics    PB REVISE KNEE JOINT REPLACE,ALL PARTS Right 9/14/2021    Procedure: REVISION, TOTAL ARTHROPLASTY, KNEE, ALL COMPONENTS;  Surgeon: Lm Leonard M.D.;  Location: SURGERY [facility];  Service: Orthopedics    HIP ARTHROPLASTY MIS TOTAL Right 2019    LAMINOTOMY  2000    L4-5    EYE SURGERY      lasik and cataracts    FOOT SURGERY      dislocated toe    KNEE ARTHROPLASTY TOTAL Right          Family History   Problem Relation Age of Onset    Heart Disease Mother     Stroke Father          Social History     Tobacco Use   Smoking Status Never   Smokeless Tobacco Never          Social  History     Substance and Sexual Activity   Alcohol Use Yes    Comment: 1 glass of wine at dinner         No Known Allergies        Outpatient Encounter Medications as of 11/2/2022   Medication Sig Dispense Refill    Evolocumab, REPATHA, (REPATHA SURECLICK) 140 MG/ML Solution Auto-injector INJECT THE CONTENTS OF ONE SYRINGE UNDER THE SKIN EVERY 14 DAYS AS DIRECTED 6 mL 24    Ferrous Sulfate (IRON PO) Take 1 Tablet by mouth every day.      Probiotic Product (SOLUBLE FIBER/PROBIOTICS PO) Take 1 Tablet by mouth every day.      ascorbic acid (ASCORBIC ACID) 500 MG Tab Take 500 mg by mouth every day.      Turmeric 500 MG Tab Take 1 Tablet by mouth every day.      calcium carbonate (OS-DEANN 500) 500 MG Tab Take 500 mg by mouth every morning with breakfast.      Cholecalciferol (VITAMIN D PO) Take 5,000 Units by mouth.      Multiple Vitamin (MULTI-VITAMINS) Tab Take 1 Tablet by mouth every day.      b complex vitamins tablet Take 1 Tablet by mouth every day.      [DISCONTINUED] REPATHA SURECLICK 140 MG/ML Solution Auto-injector INJECT THE CONTENTS OF ONE SYRINGE UNDER THE SKIN EVERY 14 DAYS AS DIRECTED 6 mL 0    [DISCONTINUED] Nirmatrelvir & Ritonavir 20 x 150 MG & 10 x 100MG Tablet Therapy Pack Take 300 mg nirmatrelvir (two 150 mg tablets) with 100 mg ritonavir (one 100 mg tablet) by mouth, with all three tablets taken together twice daily for 5 days. 30 Each 0    alendronate (FOSAMAX) 70 MG Tab Take 1 Tablet by mouth every 7 days. (Patient not taking: Reported on 11/2/2022) 12 Tablet 3     No facility-administered encounter medications on file as of 11/2/2022.         Review of Systems   Constitutional:  Negative for chills and fever.   Respiratory:  Negative for cough and shortness of breath.    Cardiovascular:  Negative for chest pain, palpitations, orthopnea, leg swelling and PND.   Neurological:  Negative for dizziness and loss of consciousness.        Objective:   /68 (BP Location: Left arm, Patient Position:  "Sitting)   Pulse 66   Resp 16   Ht 1.753 m (5' 9\")   Wt 72.6 kg (160 lb)   LMP  (LMP Unknown)   SpO2 98%   BMI 23.63 kg/m²        Physical Exam  Vitals reviewed.   Constitutional:       General: She is not in acute distress.     Appearance: Normal appearance.   HENT:      Head: Normocephalic and atraumatic.   Cardiovascular:      Rate and Rhythm: Normal rate and regular rhythm.      Pulses: Normal pulses.      Heart sounds: No murmur heard.  Pulmonary:      Effort: Pulmonary effort is normal. No respiratory distress.      Breath sounds: Normal breath sounds. No rales.   Abdominal:      General: There is no distension.   Musculoskeletal:      Right lower leg: No edema.      Left lower leg: No edema.      Comments: Swelling/enlargement of the MCP joints   Skin:     General: Skin is warm and dry.      Capillary Refill: Capillary refill takes less than 2 seconds.   Neurological:      General: No focal deficit present.      Mental Status: She is alert and oriented to person, place, and time.   Psychiatric:         Judgment: Judgment normal.        CAD 2/4/2020:  Coronary calcification:  LMA - 10.5  LCX - 0.0  LAD - 493.5  RCA - 0.0  PDA - 0.0     Total Calcium Score: 504    Echo 3/17/21:  CONCLUSIONS  Normal left ventricular systolic function. Left ventricular ejection   fraction is visually estimated to be 60%.   Grade I diastolic dysfunction.  The right ventricle was normal in size and function.  Mild tricuspid regurgitation. Right ventricular systolic pressure is   estimated to be 43 mmHg.   No prior study is available for comparison.     Exercise treadmill Test 3/2/2021:  no evidence of ischemia and an exercise tolerance of 9 minutes which is adequate  Assessment:     1. Aortic atherosclerosis (HCC)  Lipid Profile      2. Dyslipidemia  Lipid Profile      3. Coronary artery calcification seen on CAT scan  Lipid Profile           Medical Decision Making:  Today's Assessment / Plan:     Coronary " arteriosclerosis/aortic atheroschlerosis  -No ischemia suggested by recent treadmill test  -Excellent exercise tolerance  - no aspirin given her age and lack of clinical events  - PCSK9i for hyperlipidemia control       Dyslipidemia  Statin intolerance   - repeat lipids next year  - Intolerable upper extremity weakness on Lipitor and low-dose Crestor.  -Tolerating Repatha injections well.    Follow up in 1yr, sooner if problems. Follow up with PCP for Bone Density surveillance and calcium supplementation recommendations.

## 2022-11-02 ENCOUNTER — OFFICE VISIT (OUTPATIENT)
Dept: CARDIOLOGY | Facility: MEDICAL CENTER | Age: 82
End: 2022-11-02
Payer: MEDICARE

## 2022-11-02 VITALS
RESPIRATION RATE: 16 BRPM | BODY MASS INDEX: 23.7 KG/M2 | DIASTOLIC BLOOD PRESSURE: 68 MMHG | WEIGHT: 160 LBS | OXYGEN SATURATION: 98 % | SYSTOLIC BLOOD PRESSURE: 130 MMHG | HEART RATE: 66 BPM | HEIGHT: 69 IN

## 2022-11-02 DIAGNOSIS — I70.0 ATHEROSCLEROSIS OF AORTA (HCC): ICD-10-CM

## 2022-11-02 DIAGNOSIS — I25.10 CORONARY ARTERY CALCIFICATION SEEN ON CAT SCAN: ICD-10-CM

## 2022-11-02 DIAGNOSIS — E78.5 DYSLIPIDEMIA: ICD-10-CM

## 2022-11-02 PROCEDURE — 99213 OFFICE O/P EST LOW 20 MIN: CPT | Performed by: PHYSICIAN ASSISTANT

## 2022-11-02 RX ORDER — EVOLOCUMAB 140 MG/ML
INJECTION, SOLUTION SUBCUTANEOUS
Qty: 6 ML | Refills: 24 | Status: SHIPPED | OUTPATIENT
Start: 2022-11-02 | End: 2022-11-20 | Stop reason: SDUPTHER

## 2022-11-02 ASSESSMENT — ENCOUNTER SYMPTOMS
ORTHOPNEA: 0
PALPITATIONS: 0
CHILLS: 0
LOSS OF CONSCIOUSNESS: 0
DIZZINESS: 0
SHORTNESS OF BREATH: 0
COUGH: 0
PND: 0
FEVER: 0

## 2022-11-02 ASSESSMENT — FIBROSIS 4 INDEX: FIB4 SCORE: 1.53

## 2022-11-04 ENCOUNTER — OFFICE VISIT (OUTPATIENT)
Dept: MEDICAL GROUP | Facility: LAB | Age: 82
End: 2022-11-04
Payer: MEDICARE

## 2022-11-04 VITALS
DIASTOLIC BLOOD PRESSURE: 62 MMHG | BODY MASS INDEX: 24.07 KG/M2 | RESPIRATION RATE: 16 BRPM | HEART RATE: 81 BPM | OXYGEN SATURATION: 94 % | HEIGHT: 69 IN | TEMPERATURE: 96.4 F | WEIGHT: 162.48 LBS | SYSTOLIC BLOOD PRESSURE: 112 MMHG

## 2022-11-04 DIAGNOSIS — Z00.00 MEDICARE ANNUAL WELLNESS VISIT, SUBSEQUENT: ICD-10-CM

## 2022-11-04 DIAGNOSIS — Z23 NEED FOR VACCINATION: ICD-10-CM

## 2022-11-04 DIAGNOSIS — R92.30 DENSE BREAST TISSUE ON MAMMOGRAM: ICD-10-CM

## 2022-11-04 PROCEDURE — G0439 PPPS, SUBSEQ VISIT: HCPCS | Performed by: PHYSICIAN ASSISTANT

## 2022-11-04 ASSESSMENT — ACTIVITIES OF DAILY LIVING (ADL): BATHING_REQUIRES_ASSISTANCE: 0

## 2022-11-04 ASSESSMENT — FIBROSIS 4 INDEX: FIB4 SCORE: 1.53

## 2022-11-04 ASSESSMENT — PATIENT HEALTH QUESTIONNAIRE - PHQ9: CLINICAL INTERPRETATION OF PHQ2 SCORE: 0

## 2022-11-04 ASSESSMENT — ENCOUNTER SYMPTOMS: GENERAL WELL-BEING: EXCELLENT

## 2022-11-04 NOTE — PROGRESS NOTES
Chief Complaint   Patient presents with    Annual Wellness Visit       HPI:  Mirian is a 81 y.o. here for Medicare Annual Wellness Visit    Breast cancer screening - plans to f/u with [facility]     Issues with double vision, worsening throughout the day. Planning to get prism lens with optometry.     Patient Active Problem List    Diagnosis Date Noted    Basal cell carcinoma of skin of nose 03/24/2022    Total knee replacement status 09/01/2021    [place] (dyspnea on exertion) 02/08/2021    Coronary artery calcification seen on CAT scan 02/08/2021    Adjustment disorder with depressed mood 08/27/2020    Macrocytosis without anemia 01/23/2020    Hyperglycemia 01/23/2020    Status post right hip replacement 12/05/2019    Chronic pain of right knee 12/05/2019    Rheumatoid factor positive 12/05/2019    Age-related osteoporosis without current pathological fracture 12/05/2019    Do not resuscitate status 12/05/2019    Dyslipidemia 12/12/2017    Renal dysfunction 12/12/2017    Primary osteoarthritis 02/16/2017    History of tubular adenoma of the colon     Aortic atherosclerosis (HCC) 10/12/2016    History of basal cell carcinoma 11/11/2015    Hx of LASIK 04/25/2011    Presence of right artificial knee joint 07/22/2009       Current Outpatient Medications   Medication Sig Dispense Refill    alendronate (FOSAMAX) 70 MG Tab TAKE 1 TABLET BY MOUTH EVERY 7 DAYS 12 Tablet 1    Evolocumab, REPATHA, (REPATHA SURECLICK) 140 MG/ML Solution Auto-injector INJECT THE CONTENTS OF ONE SYRINGE UNDER THE SKIN EVERY 14 DAYS AS DIRECTED 6 mL 24    Ferrous Sulfate (IRON PO) Take 1 Tablet by mouth every day.      Probiotic Product (SOLUBLE FIBER/PROBIOTICS PO) Take 1 Tablet by mouth every day.      ascorbic acid (ASCORBIC ACID) 500 MG Tab Take 500 mg by mouth every day.      Turmeric 500 MG Tab Take 1 Tablet by mouth every day.      calcium carbonate (OS-DEANN 500) 500 MG Tab Take 500 mg by mouth every morning with breakfast.       Cholecalciferol (VITAMIN D PO) Take 5,000 Units by mouth.      Multiple Vitamin (MULTI-VITAMINS) Tab Take 1 Tablet by mouth every day.      b complex vitamins tablet Take 1 Tablet by mouth every day.       No current facility-administered medications for this visit.        Patient is taking medications as noted in medication list.  Current supplements as per medication list.     Allergies: Patient has no known allergies.    Current social contact/activities: working 2 days a week, see friends and family    Is patient current with immunizations? Yes.    She  reports that she has never smoked. She has never used smokeless tobacco. She reports current alcohol use. She reports that she does not use drugs.  Counseling given: Not Answered      ROS:    Gait: Uses no assistive device  Ostomy: No  Other tubes: No  Amputations: No  Chronic oxygen use No  Last eye exam 12/2021  Wears hearing aids: No  : Reports urinary leakage during the last 6 months that has not interfered at all with their daily activities or sleep.    Depression Screening  Little interest or pleasure in doing things?  0 - not at all  Feeling down, depressed, or hopeless? 0 - not at all  Patient Health Questionnaire Score: 0    If depressive symptoms identified deferred to follow up visit unless specifically addressed in assessment and plan.    Interpretation of PHQ-9 Total Score   Score Severity   1-4 No Depression   5-9 Mild Depression   10-14 Moderate Depression   15-19 Moderately Severe Depression   20-27 Severe Depression    Screening for Cognitive Impairment  Three Minute Recall (daughter, heaven, mountain)  3/3    Jeovany clock face with all 12 numbers and set the hands to show 10 past 11.  Yes 5/5  If cognitive concerns identified, deferred for follow up unless specifically addressed in assessment and plan.    Fall Risk Assessment  Has the patient had two or more falls in the last year or any fall with injury in the last year?  No  If fall risk  identified, deferred for follow up unless specifically addressed in assessment and plan.    Safety Assessment  Throw rugs on floor.  Yes  Handrails on all stairs.  No  Good lighting in all hallways.  Yes  Difficulty hearing.  Yes  Patient counseled about all safety risks that were identified.    Functional Assessment ADLs  Are there any barriers preventing you from cooking for yourself or meeting nutritional needs?  No.    Are there any barriers preventing you from driving safely or obtaining transportation?  No.    Are there any barriers preventing you from using a telephone or calling for help?  No.    Are there any barriers preventing you from shopping?  No.    Are there any barriers preventing you from taking care of your own finances?  No.    Are there any barriers preventing you from managing your medications?  No.    Are there any barriers preventing you from showering, bathing or dressing yourself?  No.    Are you currently engaging in any exercise or physical activity?  Yes.  TENNIS  What is your perception of your health?  Excellent.    Advance Care Planning  Do you have an Advance Directive, Living Will, Durable Power of , or POLST? Yes  Advance Directive       is on file    Health Maintenance Summary            Overdue - IMM HEP B VACCINE (2 of 3 - 3-dose series) Overdue since 11/14/2013      10/17/2013  Imm Admin: Hepatitis B Vaccine (Adol/Adult)              Overdue - COVID-19 Vaccine (5 - Booster for Moderna series) Overdue since 7/7/2022 05/12/2022  Imm Admin: PFIZER ATKINS CAP SARS-COV-2 VACCINATION (12+)    10/18/2021  Imm Admin: MODERNA SARS-COV-2 VACCINE (12+)    01/25/2021  Imm Admin: MODERNA SARS-COV-2 VACCINE (12+)    12/28/2020  Imm Admin: MODERNA SARS-COV-2 VACCINE (12+)              MAMMOGRAM (Yearly) Next due on 8/1/2023 08/01/2022  MA-SCREENING MAMMO BILAT W/TOMOSYNTHESIS W/CAD    07/22/2021  MA-DIAGNOSTIC MAMMO BILAT W/TOMOSYNTHESIS W/CAD    03/19/2020  MA-SCREENING  MAMMO BILAT W/TOMOSYNTHESIS W/CAD    03/12/2019  MA-SCREENING MAMMO BILAT W/TOMOSYNTHESIS W/CAD    01/11/2018  MA-DIAGNOSTIC MAMMO W/CAD-BILAT    Only the first 5 history entries have been loaded, but more history exists.              COLORECTAL CANCER SCREENING (COLONOSCOPY - Every 10 Years) Next due on 6/15/2026      06/15/2016  COLONOSCOPY (Done)              BONE DENSITY (Every 5 Years) Next due on 12/22/2026 12/22/2021  DS-BONE DENSITY STUDY (DEXA)    12/13/2019  DS-BONE DENSITY STUDY (DEXA)    10/18/2018  DS-BONE DENSITY STUDY (DEXA)    07/09/2015  Done    07/08/2015  DS-BONE DENSITY STUDY (DEXA)              IMM DTaP/Tdap/Td Vaccine (3 - Td or Tdap) Next due on 4/14/2030 04/14/2020  Imm Admin: Tdap Vaccine    09/27/2012  Imm Admin: Tdap Vaccine    12/29/2006  Imm Admin: TD Vaccine    05/05/2004  Imm Admin: TD Vaccine              IMM PNEUMOCOCCAL VACCINE: 65+ Years (Series Information) Completed      03/06/2019  Imm Admin: Pneumococcal polysaccharide vaccine (PPSV-23)    12/12/2017  Imm Admin: Pneumococcal Conjugate Vaccine (Prevnar/PCV-13)              IMM ZOSTER VACCINES (Series Information) Completed      06/25/2020  Imm Admin: Zoster Vaccine Recombinant (RZV) (SHINGRIX)    01/22/2020  Imm Admin: Zoster Vaccine Recombinant (RZV) (SHINGRIX)    09/27/2012  Imm Admin: Zoster Vaccine Live (ZVL) (Zostavax) - HISTORICAL DATA              IMM INFLUENZA (Series Information) Completed      09/26/2022  Imm Admin: Influenza, Unspecified - HISTORICAL DATA    11/08/2021  Imm Admin: Influenza Vaccine Adult HD    09/22/2020  Imm Admin: Influenza Vaccine Adult HD    10/23/2019  Imm Admin: Influenza Vaccine Adult HD    10/23/2018  Imm Admin: Influenza Vaccine Adult HD    Only the first 5 history entries have been loaded, but more history exists.              IMM MENINGOCOCCAL ACWY VACCINE (Series Information) Aged Out      No completion history exists for this topic.              Discontinued - PAP SMEAR   Discontinued      No completion history exists for this topic.                    Patient Care Team:  Mari Light M.D. as PCP - General (Family Medicine)  Liza Tenorio M.D. (Inactive) as Consulting Physician (Rheumatology)  Lm Leonard M.D. (Orthopedic Surgery)    Social History     Tobacco Use    Smoking status: Never    Smokeless tobacco: Never   Vaping Use    Vaping Use: Never used   Substance Use Topics    Alcohol use: Yes     Comment: 1 glass of wine at dinner    Drug use: Never     Family History   Problem Relation Age of Onset    Heart Disease Mother     Stroke Father      She  has a past medical history of Cancer (HCC), Cataract, High cholesterol (03/16/2022), Macular degeneration, and Osteoarthritis (03/16/2022).   Past Surgical History:   Procedure Laterality Date    FLAP GRAFT  3/24/2022    Procedure: FLAP- LOCAL ROTATIONAL;  Surgeon: Brian Ellis M.D.;  [facility]GERY [facility];  Service: Plastics    PB REVISE KNEE JOINT REPLACE,ALL PARTS Right 9/14/2021    Procedure: REVISION, TOTAL ARTHROPLASTY, KNEE, ALL COMPONENTS;  Surgeon: Lm Leonard M.D.;  Location: SURGERY [facility];  Service: Orthopedics    HIP ARTHROPLASTY MIS TOTAL Right 2019    LAMINOTOMY  2000    L4-5    EYE SURGERY      lasik and cataracts    FOOT SURGERY      dislocated toe    KNEE ARTHROPLASTY TOTAL Right        Exam:   There were no vitals taken for this visit. There is no height or weight on file to calculate BMI.    Hearing excellent.    Dentition good  Alert, oriented in no acute distress.  Eye contact is good, speech goal directed, affect calm      Assessment and Plan. The following treatment and monitoring plan is recommended:    1. Medicare annual wellness visit, subsequent       Services suggested: No services needed at this time  Health Care Screening recommendations as per orders if indicated.  Referrals offered: PT/OT/Nutrition counseling/Behavioral Health/Smoking cessation as per orders if  indicated.    Discussion today about general wellness and lifestyle habits:    Prevent falls and reduce trip hazards; Cautioned about securing or removing rugs.  Have a working fire alarm and carbon monoxide detector;   Engage in regular physical activity and social activities     Follow-up: No follow-ups on file.

## 2022-11-09 ENCOUNTER — PATIENT MESSAGE (OUTPATIENT)
Dept: HEALTH INFORMATION MANAGEMENT | Facility: OTHER | Age: 82
End: 2022-11-09

## 2022-11-20 DIAGNOSIS — I25.10 CORONARY ARTERY CALCIFICATION SEEN ON CAT SCAN: ICD-10-CM

## 2022-11-21 RX ORDER — EVOLOCUMAB 140 MG/ML
INJECTION, SOLUTION SUBCUTANEOUS
Qty: 6 ML | Refills: 24 | Status: SHIPPED | OUTPATIENT
Start: 2022-11-21 | End: 2023-11-27

## 2022-11-21 NOTE — TELEPHONE ENCOUNTER
Is the patient due for a refill? No    Was the patient seen the past year? Yes    Date of last office visit: 11/2/22    Does the patient have an upcoming appointment?  No       Provider to refill: JA     Does the patients insurance require a 100 day supply?  No    Evolocumab, REPATHA, (REPATHA SURECLICK) 140 MG/ML Solution Auto-injector 6 mL 24/24 11/2/2022     Sig: INJECT THE CONTENTS OF ONE SYRINGE UNDER THE SKIN EVERY 14 DAYS AS DIRECTED    Sent to pharmacy as: Repatha SureClick 140 MG/ML Subcutaneous Solution Auto-injector (Evolocumab (REPATHA))    E-Prescribing Status: Receipt confirmed by pharmacy (11/2/2022  8:08 AM PDT)

## 2023-03-20 ENCOUNTER — HOSPITAL ENCOUNTER (OUTPATIENT)
Dept: LAB | Facility: MEDICAL CENTER | Age: 83
End: 2023-03-20
Attending: INTERNAL MEDICINE
Payer: MEDICARE

## 2023-03-20 DIAGNOSIS — E78.5 DYSLIPIDEMIA: ICD-10-CM

## 2023-03-20 DIAGNOSIS — I25.10 CORONARY ARTERY CALCIFICATION SEEN ON CAT SCAN: ICD-10-CM

## 2023-03-20 DIAGNOSIS — I70.0 ATHEROSCLEROSIS OF AORTA (HCC): ICD-10-CM

## 2023-03-20 LAB
CHOLEST SERPL-MCNC: 180 MG/DL (ref 100–199)
FASTING STATUS PATIENT QL REPORTED: NORMAL
HDLC SERPL-MCNC: 75 MG/DL
LDLC SERPL CALC-MCNC: 90 MG/DL
TRIGL SERPL-MCNC: 73 MG/DL (ref 0–149)

## 2023-03-20 PROCEDURE — 36415 COLL VENOUS BLD VENIPUNCTURE: CPT

## 2023-03-20 PROCEDURE — 80061 LIPID PANEL: CPT

## 2023-03-22 RX ORDER — ALENDRONATE SODIUM 70 MG/1
70 TABLET ORAL
Qty: 12 TABLET | Refills: 0 | Status: SHIPPED | OUTPATIENT
Start: 2023-03-22 | End: 2023-06-06

## 2023-03-22 NOTE — RESULT ENCOUNTER NOTE
Mirian,   Thanks for getting your labs done. Your LDL is 90. Generally we like the LDL <70.   If you are still taking Repatha please continue. Repatha generally will get your LDL <70. I do think you are getting good benefits from the medication regardless.     Carina

## 2023-04-16 NOTE — DISCHARGE INSTRUCTIONS
ACTIVITY: Rest and take it easy for the first 24 hours.  A responsible adult is recommended to remain with you during that time.  It is normal to feel sleepy.  We encourage you to not do anything that requires balance, judgment or coordination.    MILD FLU-LIKE SYMPTOMS ARE NORMAL. YOU MAY EXPERIENCE GENERALIZED MUSCLE ACHES, THROAT IRRITATION, HEADACHE AND/OR SOME NAUSEA.    FOR 24 HOURS DO NOT:  Drive, operate machinery or run household appliances.  Drink beer or alcoholic beverages.   Make important decisions or sign legal documents.    Special Instructions: Follow Dr. Ellis's instructions     Keep dressing dry and clean for 2 days then okay to shower.   Cool compresses.   After dressing is removed on POD #2 apply vaseline or aquaphor to incisions twice a day       DIET: To avoid nausea, slowly advance diet as tolerated, avoiding spicy or greasy foods for the first day.  Add more substantial food to your diet according to your physician's instructions. INCREASE FLUIDS AND FIBER TO AVOID CONSTIPATION.    FOLLOW-UP APPOINTMENT:  A follow-up appointment should be arranged with your doctor; call to schedule.    You should CALL YOUR PHYSICIAN if you develop:  Fever greater than 101 degrees F.  Pain not relieved by medication, or persistent nausea or vomiting.  Excessive bleeding (blood soaking through dressing) or unexpected drainage from the wound.  Extreme redness or swelling around the incision site, drainage of pus or foul smelling drainage.  Inability to urinate or empty your bladder within 8 hours.  Problems with breathing or chest pain.    You should call 911 if you develop problems with breathing or chest pain.  If you are unable to contact your doctor or surgical center, you should go to the nearest emergency room or urgent care center.  Physician's telephone #: [phone] - Dr. Ellis      If any questions arise, call your doctor.  If your doctor is not available, please feel free to call the Surgical  Center at [phone].     A registered nurse may call you a few days after your surgery to see how you are doing after your procedure.    MEDICATIONS: Resume taking daily medication.  Take prescribed pain medication with food.  If no medication is prescribed, you may take non-aspirin pain medication if needed.  PAIN MEDICATION CAN BE VERY CONSTIPATING.  Take a stool softener or laxative such as senokot, pericolace, or milk of magnesia if needed.    Prescriptions already given     If your physician has prescribed pain medication that includes Acetaminophen (Tylenol), do not take additional Acetaminophen (Tylenol) while taking the prescribed medication.    Depression / Suicide Risk    As you are discharged from this [facility] facility, it is important to learn how to keep safe from harming yourself.    Recognize the warning signs:  · Abrupt changes in personality, positive or negative- including increase in energy   · Giving away possessions  · Change in eating patterns- significant weight changes-  positive or negative  · Change in sleeping patterns- unable to sleep or sleeping all the time   · Unwillingness or inability to communicate  · Depression  · Unusual sadness, discouragement and loneliness  · Talk of wanting to die  · Neglect of personal appearance   · Rebelliousness- reckless behavior  · Withdrawal from people/activities they love  · Confusion- inability to concentrate     If you or a loved one observes any of these behaviors or has concerns about self-harm, here's what you can do:  · Talk about it- your feelings and reasons for harming yourself  · Remove any means that you might use to hurt yourself (examples: pills, rope, extension cords, firearm)  · Get professional help from the community (Mental Health, Substance Abuse, psychological counseling)  · Do not be alone:Call your Safe Contact- someone whom you trust who will be there for you.  · Call your local CRISIS HOTLINE 611-5753 or  109-282-7504  · Call your local Children's Mobile Crisis Response Team [place] (201) 393-4021 or www.[employer].io  · Call the toll free National Suicide Prevention Hotlines   · National Suicide Prevention Lifeline 977-331-RVKH (0628)  · National [employer] Network 800-SUICIDE (436-9155)   was not compliant to cpap

## 2023-06-06 RX ORDER — ALENDRONATE SODIUM 70 MG/1
70 TABLET ORAL
Qty: 12 TABLET | Refills: 0 | Status: SHIPPED | OUTPATIENT
Start: 2023-06-06 | End: 2023-09-11

## 2023-06-06 NOTE — TELEPHONE ENCOUNTER
Received request via: Pharmacy    Was the patient seen in the last year in this department? Yes  10/4/22  Does the patient have an active prescription (recently filled or refills available) for medication(s) requested? No    Does the patient have [facility] Plus and need 100 day supply (blood pressure, diabetes and cholesterol meds only)? Medication is not for cholesterol, blood pressure or diabetes

## 2023-06-19 ENCOUNTER — OFFICE VISIT (OUTPATIENT)
Dept: MEDICAL GROUP | Facility: LAB | Age: 83
End: 2023-06-19
Payer: MEDICARE

## 2023-06-19 VITALS
BODY MASS INDEX: 23.4 KG/M2 | OXYGEN SATURATION: 95 % | WEIGHT: 158 LBS | DIASTOLIC BLOOD PRESSURE: 64 MMHG | HEIGHT: 69 IN | RESPIRATION RATE: 16 BRPM | SYSTOLIC BLOOD PRESSURE: 120 MMHG | HEART RATE: 70 BPM | TEMPERATURE: 96.9 F

## 2023-06-19 DIAGNOSIS — K05.6 PERIODONTAL DISEASE: ICD-10-CM

## 2023-06-19 PROCEDURE — 3074F SYST BP LT 130 MM HG: CPT | Performed by: PHYSICIAN ASSISTANT

## 2023-06-19 PROCEDURE — 99213 OFFICE O/P EST LOW 20 MIN: CPT | Performed by: PHYSICIAN ASSISTANT

## 2023-06-19 PROCEDURE — 3078F DIAST BP <80 MM HG: CPT | Performed by: PHYSICIAN ASSISTANT

## 2023-06-19 RX ORDER — CHLORHEXIDINE GLUCONATE ORAL RINSE 1.2 MG/ML
15 SOLUTION DENTAL 2 TIMES DAILY
Qty: 118 ML | Refills: 0 | Status: SHIPPED | OUTPATIENT
Start: 2023-06-19 | End: 2023-08-17

## 2023-06-19 RX ORDER — AMOXICILLIN 500 MG/1
500 CAPSULE ORAL 3 TIMES DAILY
Qty: 21 CAPSULE | Refills: 0 | Status: SHIPPED | OUTPATIENT
Start: 2023-06-19 | End: 2023-06-26

## 2023-06-19 ASSESSMENT — FIBROSIS 4 INDEX: FIB4 SCORE: 1.55

## 2023-06-19 NOTE — PROGRESS NOTES
"Subjective:     CC: Gum inflammation    HPI:   Mirian here today with     Going to [place] next week    Inflammation of gums and tongue, poor sense of taste  Started approx 1 week ago, now improving.    Denies fever, chills, N/V/D.  Denies painful swallowing, hoarseness.    Some joint pain in L knee and Hip - taking NSAIDs with some improvement     Denies recent dental procedures, new appliances.     Hx of dental and gum surgery        ROS:  ROS negative symptoms indicated above    Current Outpatient Medications Ordered in Epic   Medication Sig Dispense Refill    amoxicillin (AMOXIL) 500 MG Cap Take 1 Capsule by mouth 3 times a day for 7 days. 21 Capsule 0    chlorhexidine (PERIDEX) 0.12 % Solution Take 15 mL by mouth 2 times a day. 118 mL 0    alendronate (FOSAMAX) 70 MG Tab TAKE 1 TABLET BY MOUTH EVERY 7 DAYS 12 Tablet 0    Evolocumab, REPATHA, (REPATHA SURECLICK) 140 MG/ML Solution Auto-injector INJECT THE CONTENTS OF ONE SYRINGE UNDER THE SKIN EVERY 14 DAYS AS DIRECTED 6 mL 24    Ferrous Sulfate (IRON PO) Take 1 Tablet by mouth every day.      Probiotic Product (SOLUBLE FIBER/PROBIOTICS PO) Take 1 Tablet by mouth every day.      ascorbic acid (ASCORBIC ACID) 500 MG Tab Take 500 mg by mouth every day.      Turmeric 500 MG Tab Take 1 Tablet by mouth every day.      calcium carbonate (OS-DEANN 500) 500 MG Tab Take 500 mg by mouth every morning with breakfast.      Cholecalciferol (VITAMIN D PO) Take 5,000 Units by mouth.      Multiple Vitamin (MULTI-VITAMINS) Tab Take 1 Tablet by mouth every day.      b complex vitamins tablet Take 1 Tablet by mouth every day.       No current [facility]-ordered facility-administered medications on file.         Objective:     Exam:  /64   Pulse 70   Temp 36.1 °C (96.9 °F)   Resp 16   Ht 1.753 m (5' 9\")   Wt 71.7 kg (158 lb)   LMP  (LMP Unknown)   SpO2 95%   BMI 23.33 kg/m²  Body mass index is 23.33 kg/m².    Gen: Alert and oriented, No apparent distress.  HEENT: pupils " PERRLA, The pinna, tragus, and ear canal are non-tender and without swelling. The ear canal is clear without discharge. The tympanic membrane is normal in appearance with a good cone of light. Oral mucosa is pink and moist with good dentition. Tongue normal in appearance without lesions and with good symmetrical movement.  1 small ulcer noted on the right buccal surface. The pharynx is normal in appearance without tonsillar swelling or exudates.   Neck: Neck is supple without lymphadenopathy.  Lungs: Normal effort, CTA bilaterally, no wheezes, rhonchi, or rales  CV: Regular rate and rhythm. No murmurs, rubs, or gallops.  Ext: No clubbing, cyanosis, edema.      Assessment & Plan:     82 y.o. female with the following -     1. Periodontal disease  Acute condition  Symptoms appear to be resolving at this time although given patient's upcoming travel I provided her with antibiotics for acute dental infection and chlorhexidine mouth rinse as a precaution .  Discussed red flags indications for close follow-up  - amoxicillin (AMOXIL) 500 MG Cap; Take 1 Capsule by mouth 3 times a day for 7 days.  Dispense: 21 Capsule; Refill: 0  - chlorhexidine (PERIDEX) 0.12 % Solution; Take 15 mL by mouth 2 times a day.  Dispense: 118 mL; Refill: 0        I spent a total of 15 minutes with record review, exam, communication with the patient, communication with other providers, and documentation of this encounter.      No follow-ups on file.    Please note that this dictation was created using voice recognition software. I have made every reasonable attempt to correct obvious errors, but there may be errors of grammar and possibly content that I did not discover before finalizing the note.

## 2023-08-16 PROBLEM — M17.12 OSTEOARTHRITIS OF LEFT KNEE: Status: ACTIVE | Noted: 2023-08-16

## 2023-08-31 NOTE — PROGRESS NOTES
Subjective:       Patient ID: Lorena Vivas is a 73 y.o. female      Chief Complaint   Patient presents with    Macular Degeneration       History of Present Illness  HPI    12wk Octm/Eylea OS  DLS-06/08/2023 Dr. Kennedy     Pt sts no changes in vision since last visit.  Doing well with vision  Denies any eye pain   (-)Flashes (-)Floaters  (-)Photophobia  (-)Glare    Eye Meds: AT's PRN OU  Last edited by Dwight Kennedy MD on 8/31/2023  8:58 AM.        Imaging:    See report    Assessment/Plan:     1. Exudative age-related macular degeneration, left eye, with active choroidal neovascularization  Stable 12 wks s/p Ey  Prev diff to control.  Finally able to get to 12 wks.  Min SRF prev seen is resolved  Will cont slow TREX    Rec Ey today and stay at q 12 wks for now    - Prior authorization Order  - Posterior Segment OCT Retina-Both eyes    Follow up in about 12 weeks (around 11/23/2023), or if symptoms worsen or fail to improve, for OCT and INJECTION ONLY, Injection Left eye, Eylea.     Patient identified.  Timeout performed.    Risks, benefits, and alternatives to treatment were discussed in detail with the patient, including bleeding/infection (endophthalmitis)/etc.  The patient voiced understanding and wished to proceed with the procedure.  See separate consent form.    Injection Procedure Note:  Diagnosis: Wet AMD Left Eye    Topical Proparacaine drop placed then topical 5% Betadine  Sterile gloves used, and sterile lid speculum placed.  5% Betadine placed at injection site again prior to injection.  Eylea 2mg in 0.05cc Injected inferotemporally 3.5-4mm posterior to the limbus.  Complications: None  Va at least CF at 5 feet post injection.  Retina, ONH, IOP normal after injection.    Followup as above.  Patient should return immediately PRN.  Retinal Detachment and Endophthalmitis precautions given.     Virtual Visit: Established Patient   This visit was conducted via Zoom using secure and encrypted videoconferencing technology. The patient was in a private location in the state of [place].    The patient's identity was confirmed and verbal consent was obtained for this virtual visit.    Subjective:   CC:   Chief Complaint   Patient presents with   • Shortness of Breath     during exercise       Mirian Jack is a 80 y.o. female presenting for evaluation and management of:    Dyspnea on exertion  When she goes skiing she gets SOB walking to the ski lifts.  She has SOB when riding her bike uphill.  When she is writing flat she does not get any shortness of breath.  She denies true chest pain but does get some left chest fullness area  This is been occurring for the last several months.  Recovery about one min  No nausea. Gets sweaty easily  No cough  Parents were smokers  Patient never started the atorvastatin that I recommended for her high coronary artery calcification score.  She has not seen a cardiologist.  She denies any arrhythmias.      Coronary artery calcification seen on CAT scan  Coronary calcification:  LMA - 10.5  LCX - 0.0  LAD - 493.5  RCA - 0.0  PDA - 0.0     Total Calcium Score: 504        ROS   Denies any recent fevers or chills. No nausea or vomiting. No chest pains or shortness of breath.     Allergies   Allergen Reactions   • Ibuprofen Unspecified     GI upset and sweating if she takes Advil or Aspirin on a regular basis.       Current medicines (including changes today)  Current Outpatient Medications   Medication Sig Dispense Refill   • atorvastatin (LIPITOR) 80 MG tablet Take 1 Tab by mouth every evening. 90 Tab 1   • magnesium gluconate (MAG-G) 500 MG tablet Take 500 mg by mouth 3 times a day.     • Probiotic Product (SOLUBLE FIBER/PROBIOTICS PO) Take  by mouth.     • ascorbic acid (ASCORBIC ACID) 500 MG Tab Take 500 mg by mouth every day.     • Turmeric 500 MG Tab Take  by mouth.     •  "calcium carbonate (RA CALCIUM) 500 MG Tab Take  by mouth.     • cyanocobalamin (VITAMIN B12) 1000 MCG Tab Take  by mouth.     • Cholecalciferol (VITAMIN D PO) Take  by mouth.     • Multiple Vitamin (MULTI-VITAMINS) Tab Take  by mouth.     • b complex vitamins tablet Take  by mouth.       No current facility-administered medications for this visit.        Patient Active Problem List    Diagnosis Date Noted   • Dyspnea on exertion 02/08/2021   • Coronary artery calcification seen on CAT scan 02/08/2021   • Adjustment disorder with depressed mood 08/27/2020   • Macrocytosis without anemia 01/23/2020   • Hyperglycemia 01/23/2020   • Status post right hip replacement 12/05/2019   • Chronic pain of right knee 12/05/2019   • Rheumatoid factor positive 12/05/2019   • Age-related osteoporosis without current pathological fracture 12/05/2019   • Do not resuscitate status 12/05/2019   • Dyslipidemia 12/12/2017   • Renal dysfunction 12/12/2017   • Primary osteoarthritis 02/16/2017   • History of tubular adenoma of the colon    • Aortic atherosclerosis (HCC) 10/12/2016   • History of basal cell carcinoma 11/11/2015   • Hx of LASIK 04/25/2011   • Presence of right artificial knee joint 07/22/2009       Family History   Problem Relation Age of Onset   • Heart Disease Mother    • Stroke Father        She  has a past medical history of Cancer (HCC), Cataract, Macular degeneration, and Tubular adenoma of colon.  She  has a past surgical history that includes knee arthroplasty total (Right); foot surgery; laminotomy (2000); and eye surgery.       Objective:   Ht 1.753 m (5' 9\")   Wt 68.5 kg (151 lb)   LMP  (LMP Unknown)   BMI 22.30 kg/m²     Physical Exam:  Constitutional: Alert, no distress, well-groomed.  Skin: No rashes in visible areas.  Eye: Round. Conjunctiva clear, lids normal. No icterus.   ENMT: Lips pink without lesions, good dentition, moist mucous membranes. Phonation normal.  Neck: No masses, no thyromegaly. Moves " freely without pain.  Respiratory: Unlabored respiratory effort, no cough or audible wheeze  Psych: Alert and oriented x3, normal affect and mood.       Assessment and Plan:   The following treatment plan was discussed:     1. Dyspnea on exertion  - REFERRAL TO CARDIOLOGY  - EC-ECHOCARDIOGRAM COMPLETE W/O CONT; Future    2. Coronary artery calcification seen on CAT scan  - REFERRAL TO CARDIOLOGY  - atorvastatin (LIPITOR) 80 MG tablet; Take 1 Tab by mouth every evening.  Dispense: 90 Tab; Refill: 1  - EC-ECHOCARDIOGRAM COMPLETE W/O CONT; Future    Will request an urgent cardiology referral.  Echocardiogram to assess.  Discussed concern for cardiac etiology.  Start atorvastatin 80 mg daily.  Patient to go to the emergency room if she has chest pain that persists beyond 15 minutes.    Follow-up: Return in about 1 year (around 2/8/2022).

## 2023-09-11 RX ORDER — ALENDRONATE SODIUM 70 MG/1
70 TABLET ORAL
Qty: 12 TABLET | Refills: 0 | Status: SHIPPED | OUTPATIENT
Start: 2023-09-11 | End: 2023-12-08 | Stop reason: SDUPTHER

## 2023-11-06 ENCOUNTER — TELEMEDICINE (OUTPATIENT)
Dept: MEDICAL GROUP | Facility: LAB | Age: 83
End: 2023-11-06
Payer: MEDICARE

## 2023-11-06 VITALS — HEIGHT: 68 IN | BODY MASS INDEX: 22.37 KG/M2

## 2023-11-06 DIAGNOSIS — Z12.31 ENCOUNTER FOR SCREENING MAMMOGRAM FOR MALIGNANT NEOPLASM OF BREAST: ICD-10-CM

## 2023-11-06 DIAGNOSIS — R92.30 DENSE BREAST TISSUE ON MAMMOGRAM, UNSPECIFIED TYPE: ICD-10-CM

## 2023-11-06 DIAGNOSIS — M81.0 AGE-RELATED OSTEOPOROSIS WITHOUT CURRENT PATHOLOGICAL FRACTURE: ICD-10-CM

## 2023-11-06 PROCEDURE — 99213 OFFICE O/P EST LOW 20 MIN: CPT | Mod: 95 | Performed by: PHYSICIAN ASSISTANT

## 2023-11-06 ASSESSMENT — PATIENT HEALTH QUESTIONNAIRE - PHQ9: CLINICAL INTERPRETATION OF PHQ2 SCORE: 0

## 2023-11-06 NOTE — PROGRESS NOTES
Virtual Visit: Established Patient   This visit was conducted via Zoom using secure and encrypted videoconferencing technology.   The patient was in their home in the [facility].    The patient's identity was confirmed and verbal consent was obtained for this virtual visit.    Subjective:   CC:   Chief Complaint   Patient presents with    Referral Needed     Mammogram and bone density     Mirian Jack is a 82 y.o. female presenting for evaluation and management of:    Moving to [place], [place] later this year    Osteoporosis  Due for updated DEXA scan  Currently taking Fosamax with vitamin D and calcium supplementation    Breast cancer screening  History of dense breast tissue.  Requesting Sonazine in addition to mammogram    S/p knee replacement 2 weeks ago  -doing PT  -healing well, still having some pain    ROS   All ROS negative except for pertinent positives listed above.       Current medicines (including changes today)  Current Outpatient Medications   Medication Sig Dispense Refill    gabapentin (NEURONTIN) 300 MG Cap Take 1 Capsule by mouth at bedtime as needed (pain). 30 Capsule 1    aspirin (ASPIR-LOW) 81 MG EC tablet Take 1 Tablet by mouth 2 times a day with meals. 90 Tablet 0    alendronate (FOSAMAX) 70 MG Tab TAKE 1 TABLET BY MOUTH EVERY 7 DAYS 12 Tablet 0    Evolocumab, REPATHA, (REPATHA SURECLICK) 140 MG/ML Solution Auto-injector INJECT THE CONTENTS OF ONE SYRINGE UNDER THE SKIN EVERY 14 DAYS AS DIRECTED 6 mL 24    Ferrous Sulfate (IRON PO) Take 1 Tablet by mouth every day.      Probiotic Product (SOLUBLE FIBER/PROBIOTICS PO) Take 1 Tablet by mouth every day.      ascorbic acid (ASCORBIC ACID) 500 MG Tab Take 500 mg by mouth every day.      Turmeric 500 MG Tab Take 1 Tablet by mouth every day.      calcium carbonate (OS-DEANN 500) 500 MG Tab Take 500 mg by mouth every morning with breakfast.      Cholecalciferol (VITAMIN D PO) Take 5,000 Units by mouth.      Multiple Vitamin (MULTI-VITAMINS) Tab  "Take 1 Tablet by mouth every day.      b complex vitamins tablet Take 1 Tablet by mouth every day.       No current facility-administered medications for this visit.       Patient Active Problem List    Diagnosis Date Noted    Osteoarthritis of left knee 08/16/2023    Basal cell carcinoma of skin of nose 03/24/2022    Total knee replacement status 09/01/2021    [place] (dyspnea on exertion) 02/08/2021    Coronary artery calcification seen on CAT scan 02/08/2021    Adjustment disorder with depressed mood 08/27/2020    Macrocytosis without anemia 01/23/2020    Hyperglycemia 01/23/2020    Status post right hip replacement 12/05/2019    Chronic pain of right knee 12/05/2019    Rheumatoid factor positive 12/05/2019    Age-related osteoporosis without current pathological fracture 12/05/2019    Do not resuscitate status 12/05/2019    Dyslipidemia 12/12/2017    Renal dysfunction 12/12/2017    Primary osteoarthritis 02/16/2017    History of tubular adenoma of the colon     Aortic atherosclerosis (HCC) 10/12/2016    History of basal cell carcinoma 11/11/2015    Hx of LASIK 04/25/2011    Presence of right artificial knee joint 07/22/2009        Objective:   Ht 1.727 m (5' 8\")   LMP  (LMP Unknown)   BMI 22.37 kg/m²     Physical Exam:  Constitutional: Alert, no distress, well-groomed.  Skin: No rashes in visible areas.  Eye: Round. Conjunctiva clear, lids normal. No icterus.   ENMT: Lips pink without lesions, good dentition, moist mucous membranes. Phonation normal.  Neck: No masses, no thyromegaly. Moves freely without pain.  Respiratory: Unlabored respiratory effort, no cough or audible wheeze  Psych: Alert and oriented x3, normal affect and mood.     Assessment and Plan:   The following treatment plan was discussed:     1. Age-related osteoporosis without current pathological fracture  - DS-BONE DENSITY STUDY (DEXA); Future    2. Dense breast tissue on mammogram, unspecified type  - US-SCREENING WHOLE BREAST BILATERAL (3D " SCREENING); Future    3. Encounter for screening mammogram for malignant neoplasm of breast  - MA-SCREENING MAMMO BILAT W/TOMOSYNTHESIS W/CAD; Future      Follow-up: No follow-ups on file.

## 2023-11-27 DIAGNOSIS — I25.10 CORONARY ARTERY CALCIFICATION SEEN ON CAT SCAN: ICD-10-CM

## 2023-11-27 DIAGNOSIS — Z79.899 HIGH RISK MEDICATION USE: ICD-10-CM

## 2023-11-27 RX ORDER — EVOLOCUMAB 140 MG/ML
140 INJECTION, SOLUTION SUBCUTANEOUS
Qty: 6 ML | Refills: 0 | Status: SHIPPED | OUTPATIENT
Start: 2023-11-27 | End: 2023-12-07 | Stop reason: SDUPTHER

## 2023-12-05 ENCOUNTER — HOSPITAL ENCOUNTER (OUTPATIENT)
Dept: LAB | Facility: MEDICAL CENTER | Age: 83
End: 2023-12-05
Attending: PHYSICIAN ASSISTANT
Payer: MEDICARE

## 2023-12-05 DIAGNOSIS — Z79.899 HIGH RISK MEDICATION USE: ICD-10-CM

## 2023-12-05 LAB
ALBUMIN SERPL BCP-MCNC: 4.3 G/DL (ref 3.2–4.9)
ALBUMIN/GLOB SERPL: 2 G/DL
ALP SERPL-CCNC: 102 U/L (ref 30–99)
ALT SERPL-CCNC: 14 U/L (ref 2–50)
ANION GAP SERPL CALC-SCNC: 10 MMOL/L (ref 7–16)
AST SERPL-CCNC: 20 U/L (ref 12–45)
BILIRUB SERPL-MCNC: 0.4 MG/DL (ref 0.1–1.5)
BUN SERPL-MCNC: 18 MG/DL (ref 8–22)
CALCIUM ALBUM COR SERPL-MCNC: 9.2 MG/DL (ref 8.5–10.5)
CALCIUM SERPL-MCNC: 9.4 MG/DL (ref 8.5–10.5)
CHLORIDE SERPL-SCNC: 105 MMOL/L (ref 96–112)
CHOLEST SERPL-MCNC: 176 MG/DL (ref 100–199)
CO2 SERPL-SCNC: 29 MMOL/L (ref 20–33)
CREAT SERPL-MCNC: 0.66 MG/DL (ref 0.5–1.4)
FASTING STATUS PATIENT QL REPORTED: NORMAL
GFR SERPLBLD CREATININE-BSD FMLA CKD-EPI: 87 ML/MIN/1.73 M 2
GLOBULIN SER CALC-MCNC: 2.2 G/DL (ref 1.9–3.5)
GLUCOSE SERPL-MCNC: 88 MG/DL (ref 65–99)
HDLC SERPL-MCNC: 75 MG/DL
LDLC SERPL CALC-MCNC: 80 MG/DL
POTASSIUM SERPL-SCNC: 4.7 MMOL/L (ref 3.6–5.5)
PROT SERPL-MCNC: 6.5 G/DL (ref 6–8.2)
SODIUM SERPL-SCNC: 144 MMOL/L (ref 135–145)
TRIGL SERPL-MCNC: 104 MG/DL (ref 0–149)

## 2023-12-05 PROCEDURE — 36415 COLL VENOUS BLD VENIPUNCTURE: CPT

## 2023-12-05 PROCEDURE — 80061 LIPID PANEL: CPT

## 2023-12-05 PROCEDURE — 80053 COMPREHEN METABOLIC PANEL: CPT

## 2023-12-07 ENCOUNTER — OFFICE VISIT (OUTPATIENT)
Dept: CARDIOLOGY | Facility: MEDICAL CENTER | Age: 83
End: 2023-12-07
Attending: PHYSICIAN ASSISTANT
Payer: MEDICARE

## 2023-12-07 VITALS
BODY MASS INDEX: 22.88 KG/M2 | OXYGEN SATURATION: 94 % | HEIGHT: 68 IN | DIASTOLIC BLOOD PRESSURE: 62 MMHG | WEIGHT: 151 LBS | HEART RATE: 95 BPM | SYSTOLIC BLOOD PRESSURE: 110 MMHG | RESPIRATION RATE: 16 BRPM

## 2023-12-07 DIAGNOSIS — I70.0 ATHEROSCLEROSIS OF AORTA (HCC): ICD-10-CM

## 2023-12-07 DIAGNOSIS — Z78.9 STATIN INTOLERANCE: ICD-10-CM

## 2023-12-07 DIAGNOSIS — Z79.899 HIGH RISK MEDICATION USE: ICD-10-CM

## 2023-12-07 DIAGNOSIS — I25.10 CORONARY ARTERY CALCIFICATION SEEN ON CAT SCAN: ICD-10-CM

## 2023-12-07 DIAGNOSIS — E78.5 DYSLIPIDEMIA: ICD-10-CM

## 2023-12-07 PROCEDURE — 99213 OFFICE O/P EST LOW 20 MIN: CPT | Performed by: PHYSICIAN ASSISTANT

## 2023-12-07 PROCEDURE — 3078F DIAST BP <80 MM HG: CPT | Performed by: PHYSICIAN ASSISTANT

## 2023-12-07 PROCEDURE — 3074F SYST BP LT 130 MM HG: CPT | Performed by: PHYSICIAN ASSISTANT

## 2023-12-07 PROCEDURE — 99214 OFFICE O/P EST MOD 30 MIN: CPT | Performed by: PHYSICIAN ASSISTANT

## 2023-12-07 RX ORDER — EVOLOCUMAB 140 MG/ML
140 INJECTION, SOLUTION SUBCUTANEOUS
Qty: 6 ML | Refills: 0 | Status: SHIPPED | OUTPATIENT
Start: 2023-12-07 | End: 2024-02-20

## 2023-12-07 RX ORDER — VITAMIN B COMPLEX
1000 TABLET ORAL DAILY
COMMUNITY

## 2023-12-07 ASSESSMENT — ENCOUNTER SYMPTOMS
CONSTITUTIONAL NEGATIVE: 1
NAUSEA: 0
BLURRED VISION: 0
PND: 0
DOUBLE VISION: 0
ORTHOPNEA: 0
ABDOMINAL PAIN: 0
MYALGIAS: 0
FEVER: 0
NEUROLOGICAL NEGATIVE: 1
VOMITING: 0
BRUISES/BLEEDS EASILY: 0
PSYCHIATRIC NEGATIVE: 1
CLAUDICATION: 0
WEAKNESS: 0
HEADACHES: 0
PALPITATIONS: 0
DIZZINESS: 0
CHILLS: 0
SHORTNESS OF BREATH: 0
GASTROINTESTINAL NEGATIVE: 1
COUGH: 0
LOSS OF CONSCIOUSNESS: 0
EYES NEGATIVE: 1
MUSCULOSKELETAL NEGATIVE: 1

## 2023-12-07 ASSESSMENT — FIBROSIS 4 INDEX: FIB4 SCORE: 1.51

## 2023-12-07 NOTE — PROGRESS NOTES
Chief Complaint   Patient presents with    Aortic Atherosclerosis    Dyslipidemia       Subjective     Mirian Jack is a 83 y.o. female with a history of coronary calcium on CT, hyperlipidemia and statin intolerance who presents today for yearly follow up.    She is a previous patient of Dr. Gee. Last seen 11/2/2022 by Carina MARKHAM. At that exam, She was doing well and reported that she remains active without any issues. Her medications were continued and she was to follow up in 1 year.    Today, she reports she continues to do well. She does not have any current cardiac concerns. No chest pain or palpitations. No shortness of breath, dyspnea on exertion, orthopnea or PND. No lower extremity edema. No dizziness or lightheadedness. No syncope or presyncope.      She continues to have excellent exercise tolerance.     Past Medical History:   Diagnosis Date    Cancer (HCC)     basal cell and squamous cell     Cataract     Bilat IOLs    High cholesterol 03/16/2022    Macular degeneration     Osteoarthritis 03/16/2022     Past Surgical History:   Procedure Laterality Date    PB TOTAL KNEE ARTHROPLASTY Left 10/16/2023    Procedure: LEFT TOTAL KNEE ARTHROPLASTY;  Surgeon: Lm Leonard M.D.;  Location: [facility] [place];  Service: Orthopedics    FLAP GRAFT  3/24/2022    Procedure: FLAP- LOCAL ROTATIONAL;  Surgeon: Brian Ellis M.D.;  Location: SURGERY SAME DAY [facility];  Service: Plastics    PB REVISE KNEE JOINT REPLACE,ALL PARTS Right 9/14/2021    Procedure: REVISION, TOTAL ARTHROPLASTY, KNEE, ALL COMPONENTS;  Surgeon: Lm Leonard M.D.;  Location: SURGERY [facility];  Service: Orthopedics    HIP ARTHROPLASTY MIS TOTAL Right 2019    LAMINOTOMY  2000    L4-5    EYE SURGERY      lasik and cataracts    FOOT SURGERY      dislocated toe    KNEE ARTHROPLASTY TOTAL Right      Family History   Problem Relation Age of Onset    Heart Disease Mother     Stroke Father      Social History      Socioeconomic History    Marital status:      Spouse name: Not on file    Number of children: Not on file    Years of education: Not on file    Highest education level: Not on file   Occupational History    Not on file   Tobacco Use    Smoking status: Never    Smokeless tobacco: Never   Vaping Use    Vaping Use: Never used   Substance and Sexual Activity    Alcohol use: Yes     Comment: 1 glass of wine at dinner    Drug use: Never    Sexual activity: Yes     Partners: Male     Birth control/protection: Post-Menopausal   Other Topics Concern    Not on file   Social History Narrative    Not on file     Social Determinants of Health     Financial Resource Strain: Not on file   Food Insecurity: Not on file   Transportation Needs: Not on file   Physical Activity: Not on file   Stress: Not on file   Social Connections: Not on file   Intimate Partner Violence: Not on file   Housing Stability: Not on file     No Known Allergies  Outpatient Encounter Medications as of 12/7/2023   Medication Sig Dispense Refill    vitamin D3 (CHOLECALCIFEROL) 1000 Unit (25 mcg) Tab Take 1,000 Units by mouth every day.      Evolocumab (REPATHA SURECLICK) 140 MG/ML Solution Auto-injector SubQ injection pen Inject 1 mL under the skin every 14 days. 6 mL 0    gabapentin (NEURONTIN) 300 MG Cap TAKE 1 CAPSULE BY MOUTH AT BEDTIME AS NEEDED FOR PAIN 30 Capsule 1    pregabalin (LYRICA) 75 MG Cap Take 1 Capsule by mouth 2 times a day for 60 days. 60 Capsule 1    aspirin (ASPIR-LOW) 81 MG EC tablet Take 1 Tablet by mouth 2 times a day with meals. (Patient taking differently: Take 81 mg by mouth every day.) 90 Tablet 0    alendronate (FOSAMAX) 70 MG Tab TAKE 1 TABLET BY MOUTH EVERY 7 DAYS 12 Tablet 0    Ferrous Sulfate (IRON PO) Take 1 Tablet by mouth every day.      Probiotic Product (SOLUBLE FIBER/PROBIOTICS PO) Take 1 Tablet by mouth every day.      ascorbic acid (ASCORBIC ACID) 500 MG Tab Take 500 mg by mouth every day.      Turmeric 500  "MG Tab Take 1 Tablet by mouth every day.      calcium carbonate (OS-DEANN 500) 500 MG Tab Take 500 mg by mouth every morning with breakfast.      Multiple Vitamin (MULTI-VITAMINS) Tab Take 1 Tablet by mouth every day.      b complex vitamins tablet Take 1 Tablet by mouth every day.      [DISCONTINUED] Evolocumab (REPATHA SURECLICK) 140 MG/ML Solution Auto-injector SubQ injection pen Inject 1 mL under the skin every 14 days. 6 mL 0    [DISCONTINUED] Cholecalciferol (VITAMIN D PO) Take 5,000 Units by mouth.       No facility-administered encounter medications on file as of 12/7/2023.     Review of Systems   Constitutional: Negative.  Negative for chills, fever and malaise/fatigue.   HENT: Negative.     Eyes: Negative.  Negative for blurred vision and double vision.   Respiratory:  Negative for cough and shortness of breath.    Cardiovascular:  Negative for chest pain, palpitations, orthopnea, claudication, leg swelling and PND.   Gastrointestinal: Negative.  Negative for abdominal pain, nausea and vomiting.   Genitourinary: Negative.    Musculoskeletal: Negative.  Negative for myalgias.   Skin: Negative.  Negative for rash.   Neurological: Negative.  Negative for dizziness, loss of consciousness, weakness and headaches.        Balance issues due to her hips   Endo/Heme/Allergies: Negative.  Does not bruise/bleed easily.   Psychiatric/Behavioral: Negative.                Objective     /62 (BP Location: Left arm, Patient Position: Sitting, BP Cuff Size: Adult)   Pulse 95   Resp 16   Ht 1.727 m (5' 8\")   Wt 68.5 kg (151 lb)   LMP  (LMP Unknown)   SpO2 94%   BMI 22.96 kg/m²     Physical Exam  Vitals reviewed.   Constitutional:       General: She is not in acute distress.     Appearance: Normal appearance.   HENT:      Head: Normocephalic and atraumatic.      Right Ear: External ear normal.      Left Ear: External ear normal.   Eyes:      General: No scleral icterus.     Extraocular Movements: Extraocular " movements intact.      Conjunctiva/sclera: Conjunctivae normal.      Pupils: Pupils are equal, round, and reactive to light.   Cardiovascular:      Rate and Rhythm: Normal rate and regular rhythm.      Pulses: Normal pulses.      Heart sounds: Normal heart sounds. No murmur heard.     No friction rub. No gallop.   Pulmonary:      Effort: Pulmonary effort is normal.      Breath sounds: Normal breath sounds.   Abdominal:      General: Bowel sounds are normal.      Palpations: Abdomen is soft.      Tenderness: There is no abdominal tenderness.   Musculoskeletal:         General: Normal range of motion.      Cervical back: Normal range of motion and neck supple.      Right lower leg: No edema.      Left lower leg: No edema.   Skin:     General: Skin is warm and dry.      Capillary Refill: Capillary refill takes less than 2 seconds.   Neurological:      General: No focal deficit present.      Mental Status: She is alert and oriented to person, place, and time.   Psychiatric:         Mood and Affect: Mood normal.         Behavior: Behavior normal.         Judgment: Judgment normal.       Lab Results   Component Value Date/Time    CHOLSTRLTOT 176 12/05/2023 06:14 AM    LDL 80 12/05/2023 06:14 AM    HDL 75 12/05/2023 06:14 AM    TRIGLYCERIDE 104 12/05/2023 06:14 AM       Lab Results   Component Value Date/Time    SODIUM 144 12/05/2023 06:14 AM    POTASSIUM 4.7 12/05/2023 06:14 AM    CHLORIDE 105 12/05/2023 06:14 AM    CO2 29 12/05/2023 06:14 AM    GLUCOSE 88 12/05/2023 06:14 AM    BUN 18 12/05/2023 06:14 AM    CREATININE 0.66 12/05/2023 06:14 AM     Lab Results   Component Value Date/Time    ALKPHOSPHAT 102 (H) 12/05/2023 06:14 AM    ASTSGOT 20 12/05/2023 06:14 AM    ALTSGPT 14 12/05/2023 06:14 AM    TBILIRUBIN 0.4 12/05/2023 06:14 AM       Cardiovascular imaging and procedures:    EKG 9/8/2023  Personally interpreted by me as Sinus rhythm with low voltage.    Echocardiogram 3/17/2021  CONCLUSIONS  Normal left ventricular  systolic function. Left ventricular ejection   fraction is visually estimated to be 60%.   Grade I diastolic dysfunction.  The right ventricle was normal in size and function.  Mild tricuspid regurgitation. Right ventricular systolic pressure is   estimated to be 43 mmHg.   No prior study is available for comparison.    Treadmill stress 3/1/2021  Provider conclusions and analysis:   Normal resting EKG.   Normal exercise tolerance.   Very rare isolated PVC in exercise and recovery phases of test.   Negative for ischemia.          Assessment & Plan     1. Coronary artery calcification seen on CAT scan  Evolocumab (REPATHA SURECLICK) 140 MG/ML Solution Auto-injector SubQ injection pen      2. Aortic atherosclerosis (HCC)  Comp Metabolic Panel    Lipid Profile      3. High risk medication use        4. Dyslipidemia        5. Statin intolerance            Medical Decision Making: Today's Assessment/Status/Plan:        CAD on CT  - No cardiac concerns.   - Negative treadmill stress testing 2021  - She continues to have excellent exercise tolerance.  - Continue aspirin 81mg and repatha.     Dyslipidemia  Statin Intolerance  - LDL 80. At goal <100  - She previously had intolerable upper extremity weakness on atorvastatin and rosuvastatin.   - Tolerating Repatha injections. Continue.   - Repeat CMP and lipid panel in 1 year.    Follow up in 1 year or sooner with clinical changes.    Encouraged her to reach out via MyChart or telephone with any questions or concerns.    Thank you for allowing me to participate in the care of Mirian Marika Jack .    Marion Garvin PA-C, Cardiology  [facility]  [facility], [address]  [address] [address]  Phone: [phone]  Fax: [phone]    PLEASE NOTE: This note was created using voice recognition software. I have made every reasonable attempt to correct obvious errors, but I expect that there are errors of  grammar and possibly content that I did not discover before finalizing the note.

## 2023-12-11 RX ORDER — ALENDRONATE SODIUM 70 MG/1
70 TABLET ORAL
Qty: 12 TABLET | Refills: 0 | Status: SHIPPED | OUTPATIENT
Start: 2023-12-11 | End: 2024-03-04

## 2024-02-19 DIAGNOSIS — I25.10 CORONARY ARTERY CALCIFICATION SEEN ON CAT SCAN: ICD-10-CM

## 2024-02-20 RX ORDER — EVOLOCUMAB 140 MG/ML
140 INJECTION, SOLUTION SUBCUTANEOUS
Qty: 6 ML | Refills: 2 | Status: SHIPPED | OUTPATIENT
Start: 2024-02-20

## 2024-02-21 ENCOUNTER — HOSPITAL ENCOUNTER (OUTPATIENT)
Dept: RADIOLOGY | Facility: MEDICAL CENTER | Age: 84
End: 2024-02-21
Attending: PHYSICIAN ASSISTANT
Payer: MEDICARE

## 2024-02-21 DIAGNOSIS — Z12.31 ENCOUNTER FOR SCREENING MAMMOGRAM FOR MALIGNANT NEOPLASM OF BREAST: ICD-10-CM

## 2024-02-21 DIAGNOSIS — M81.0 AGE-RELATED OSTEOPOROSIS WITHOUT CURRENT PATHOLOGICAL FRACTURE: ICD-10-CM

## 2024-02-21 DIAGNOSIS — R92.30 DENSE BREAST TISSUE ON MAMMOGRAM, UNSPECIFIED TYPE: ICD-10-CM

## 2024-02-21 PROCEDURE — 77080 DXA BONE DENSITY AXIAL: CPT

## 2024-02-21 PROCEDURE — 76641 ULTRASOUND BREAST COMPLETE: CPT

## 2024-02-21 PROCEDURE — 77067 SCR MAMMO BI INCL CAD: CPT

## 2024-03-04 RX ORDER — ALENDRONATE SODIUM 70 MG/1
70 TABLET ORAL
Qty: 12 TABLET | Refills: 0 | Status: SHIPPED | OUTPATIENT
Start: 2024-03-04

## 2024-03-04 NOTE — TELEPHONE ENCOUNTER
Received request via: Pharmacy    Was the patient seen in the last year in this department? Yes  LOV : 6/19/2023   Does the patient have an active prescription (recently filled or refills available) for medication(s) requested? No    Pharmacy Name: [facility]     Does the patient have [facility] and need 100 day supply (blood pressure, diabetes and cholesterol meds only)? Patient does not have SCP

## 2024-05-20 DIAGNOSIS — I25.10 CORONARY ARTERY CALCIFICATION SEEN ON CAT SCAN: ICD-10-CM

## 2024-05-20 RX ORDER — EVOLOCUMAB 140 MG/ML
140 INJECTION, SOLUTION SUBCUTANEOUS
Qty: 6 ML | Refills: 2 | Status: SHIPPED | OUTPATIENT
Start: 2024-05-20

## 2024-05-21 ENCOUNTER — TELEMEDICINE (OUTPATIENT)
Dept: MEDICAL GROUP | Facility: LAB | Age: 84
End: 2024-05-21
Payer: MEDICARE

## 2024-05-21 VITALS — BODY MASS INDEX: 22.96 KG/M2 | HEIGHT: 68 IN

## 2024-05-21 DIAGNOSIS — M81.0 AGE-RELATED OSTEOPOROSIS WITHOUT CURRENT PATHOLOGICAL FRACTURE: ICD-10-CM

## 2024-05-21 PROCEDURE — 99213 OFFICE O/P EST LOW 20 MIN: CPT | Mod: 95 | Performed by: PHYSICIAN ASSISTANT

## 2024-05-21 RX ORDER — ALENDRONATE SODIUM 70 MG/1
70 TABLET ORAL
Qty: 12 TABLET | Refills: 3 | Status: SHIPPED | OUTPATIENT
Start: 2024-05-21

## 2024-05-21 ASSESSMENT — PATIENT HEALTH QUESTIONNAIRE - PHQ9: CLINICAL INTERPRETATION OF PHQ2 SCORE: 0
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
## 2024-08-05 ENCOUNTER — TELEPHONE (OUTPATIENT)
Dept: CARDIOLOGY | Facility: MEDICAL CENTER | Age: 84
End: 2024-08-05
Payer: MEDICARE

## 2024-08-05 NOTE — TELEPHONE ENCOUNTER
AM  Caller: Mirian Jack    Topic/issue: Patient wanted to let the office know that she is no longer living in the [facility] and that she has found a new cardiologist in [place], who she will be following up with.    Callback Number: [phone]    Thank you,  Sue POP

## 2024-08-05 NOTE — TELEPHONE ENCOUNTER
To AM: just LAM. pt moved to [place] and will no longer seeing any of our providers. Pt had her last OV with AM. Thank you,

## 2024-11-05 DIAGNOSIS — M81.0 AGE-RELATED OSTEOPOROSIS WITHOUT CURRENT PATHOLOGICAL FRACTURE: ICD-10-CM

## 2024-11-06 RX ORDER — ALENDRONATE SODIUM 70 MG/1
70 TABLET ORAL
Qty: 12 TABLET | Refills: 3 | Status: SHIPPED | OUTPATIENT
Start: 2024-11-06

## 2024-11-06 NOTE — TELEPHONE ENCOUNTER
Received request via: Pharmacy    Was the patient seen in the last year in this department? Yes    Does the patient have an active prescription (recently filled or refills available) for medication(s) requested? No    Pharmacy Name: [facility]     Does the patient have [facility] and need 100-day supply? (This applies to ALL medications) Patient does not have SCP

## (undated) DEVICE — GOWN WARMING STANDARD FLEX - (30/CA)

## (undated) DEVICE — KIT ANESTHESIA W/CIRCUIT & 3/LT BAG W/FILTER (20EA/CA)

## (undated) DEVICE — SUCTION INSTRUMENT YANKAUER BULBOUS TIP W/O VENT (50EA/CA)

## (undated) DEVICE — DRESSING XEROFORM 1X8 - (50/BX 4BX/CA)

## (undated) DEVICE — WATER IRRIGATION STERILE 1000ML (12EA/CA)

## (undated) DEVICE — SPONGE GAUZESTER 4 X 4 4PLY - (128PK/CA)

## (undated) DEVICE — SOD. CHL. INJ. 0.9% 1000 ML - (14EA/CA 60CA/PF)

## (undated) DEVICE — MANIFOLD NEPTUNE 1 PORT (20/PK)

## (undated) DEVICE — KIT  I.V. START (100EA/CA)

## (undated) DEVICE — GLOVE BIOGEL INDICATOR SZ 8.5 SURGICAL PF LTX - (50/BX 4BX/CA)

## (undated) DEVICE — SET EXTENSION WITH 2 PORTS (48EA/CA) ***PART #2C8610 IS A SUBSTITUTE*****

## (undated) DEVICE — GLOVE BIOGEL SZ 8 SURGICAL PF LTX - (50PR/BX 4BX/CA)

## (undated) DEVICE — CLOSURE SKIN STRIP 1/2 X 4 IN - (STERI STRIP) (50/BX 4BX/CA)

## (undated) DEVICE — GLOVE BIOGEL PI ORTHO SZ 8 PF LF (40PR/BX)

## (undated) DEVICE — CANISTER SUCTION 3000ML MECHANICAL FILTER AUTO SHUTOFF MEDI-VAC NONSTERILE LF DISP  (40EA/CA)

## (undated) DEVICE — BLADE SAGITTAL SYSTEM 4

## (undated) DEVICE — PACK ENT OR - (2EA/CA)

## (undated) DEVICE — TOWELS CLOTH SURGICAL - (4/PK 20PK/CA)

## (undated) DEVICE — NEPTUNE 4 PORT MANIFOLD - (20/PK)

## (undated) DEVICE — SLEEVE, VASO, THIGH, MED

## (undated) DEVICE — SODIUM CHL IRRIGATION 0.9% 1000ML (12EA/CA)

## (undated) DEVICE — TUBING CLEARLINK DUO-VENT - C-FLO (48EA/CA)

## (undated) DEVICE — PROTECTOR ULNA NERVE - (36PR/CA)

## (undated) DEVICE — PIN TROCAR GNS 1/8INX5IN (1EA)

## (undated) DEVICE — LACTATED RINGERS INJ 1000 ML - (14EA/CA 60CA/PF)

## (undated) DEVICE — BLADE SAGITTAL 6 SYSTEM 25MM

## (undated) DEVICE — SUTURE 3-0 MONOCRYL PLUS PS-1 - 27 INCH (36/BX)

## (undated) DEVICE — ELECTRODE 850 FOAM ADHESIVE - HYDROGEL RADIOTRNSPRNT (50/PK)

## (undated) DEVICE — HANDPIECE 10FT INTPLS SCT PLS IRRIGATION HAND CONTROL SET (6/PK)

## (undated) DEVICE — GLOVE BIOGEL INDICATOR SZ 7SURGICAL PF LTX - (50/BX 4BX/CA)

## (undated) DEVICE — MASK ANESTHESIA ADULT  - (100/CA)

## (undated) DEVICE — SENSOR SPO2 NEO LNCS ADHESIVE (20/BX) SEE USER NOTES

## (undated) DEVICE — SET LEADWIRE 5 LEAD BEDSIDE DISPOSABLE ECG (1SET OF 5/EA)

## (undated) DEVICE — Device

## (undated) DEVICE — HEAD HOLDER JUNIOR/ADULT

## (undated) DEVICE — TOWEL STOP TIMEOUT SAFETY FLAG (40EA/CA)

## (undated) DEVICE — TIP INTPLS HFLO ML ORFC BTRY - (12/CS)  FOR SURGILAV

## (undated) DEVICE — SUTURE 2-0 MONOCRYL PLUS UNDYED CT-1 1 X 36 (36EA/BX)"

## (undated) DEVICE — SUTURE 5-0 ETHILON P-3 18 (12PK/BX)"

## (undated) DEVICE — ELECTRODE DUAL RETURN W/ CORD - (50/PK)

## (undated) DEVICE — CATHETER IV 20 GA X 1-1/4 ---SURG.& SDS ONLY--- (50EA/BX)

## (undated) DEVICE — TUBE CONNECTING SUCTION - CLEAR PLASTIC STERILE 72 IN (50EA/CA)

## (undated) DEVICE — SUTURE 5-0 VICRYL PLUS P-3 18 (36PK/BX)"

## (undated) DEVICE — CHLORAPREP 26 ML APPLICATOR - ORANGE TINT(25/CA)

## (undated) DEVICE — GLOVE SZ 8 BIOGEL PI MICRO - PF LF (50PR/BX)

## (undated) DEVICE — DRESSING TRANSPARENT FILM TEGADERM 4 X 4.75" (50EA/BX)"

## (undated) DEVICE — PACK MINOR BASIN - (2EA/CA)

## (undated) DEVICE — SUTURE 1 VICRYL PLUS CTX - 8 X 18 INCH (12/BX)

## (undated) DEVICE — BLADE SAGITTAL SYSTEM 18MM

## (undated) DEVICE — SUTURE 3-0 MONOCRYL PLUS PS-2 - (12/BX)

## (undated) DEVICE — BLADE OSCILLATING 9.0X31.0X0.4MM LIKE STRYKER 2296-3-125

## (undated) DEVICE — STOCKINET TUBULAR 6IN STERILE - 6 X 48YDS (25/CA)

## (undated) DEVICE — PACK TOTAL KNEE  (1/CA)

## (undated) DEVICE — SUTURE 2-0 VICRYL PLUS SH - 8 X 18 INCH (12/BX)

## (undated) DEVICE — GLOVE SZ 6.5 BIOGEL PI MICRO - PF LF (50PR/BX)

## (undated) DEVICE — SUTURE 4-0 SILK SH C/R 8 X 18 (12EA/BX)"

## (undated) DEVICE — BOVIE NEEDLE TIP 3CM COLORADO

## (undated) DEVICE — SLEEVE VASO CALF MED - (10PR/CA)

## (undated) DEVICE — SUTURE 4-0 MONOCRYL PLUS PS-2 - 27 INCH (36/BX)

## (undated) DEVICE — SPONGE GAUZESTER. 2X2 4-PL - (2/PK 50PK/BX 30BX/CS)

## (undated) DEVICE — SUTURE GENERAL

## (undated) DEVICE — TRAY CATHETER FOLEY URINE METER W/STATLOCK 350ML (10EA/CA)

## (undated) DEVICE — LENS/HOOD FOR SPACESUIT - (32/PK) PEEL AWAY FACE

## (undated) DEVICE — CANISTER SUCTION RIGID RED 1500CC (40EA/CA)

## (undated) DEVICE — PIN TROCAR GEN 1/8X3 (4EA/BX)

## (undated) DEVICE — PLUMEPEN ULTRA 3/8 IN X 10 FT HOSE (20EA/CA)